# Patient Record
Sex: MALE | Race: WHITE | NOT HISPANIC OR LATINO | RURAL
[De-identification: names, ages, dates, MRNs, and addresses within clinical notes are randomized per-mention and may not be internally consistent; named-entity substitution may affect disease eponyms.]

---

## 2021-04-13 DIAGNOSIS — N46.9 INFERTILITY MALE: Primary | ICD-10-CM

## 2022-09-01 ENCOUNTER — OFFICE VISIT (OUTPATIENT)
Dept: FAMILY MEDICINE | Facility: CLINIC | Age: 47
End: 2022-09-01
Payer: COMMERCIAL

## 2022-09-01 VITALS
HEART RATE: 98 BPM | BODY MASS INDEX: 34.66 KG/M2 | SYSTOLIC BLOOD PRESSURE: 160 MMHG | WEIGHT: 234 LBS | HEIGHT: 69 IN | DIASTOLIC BLOOD PRESSURE: 98 MMHG

## 2022-09-01 DIAGNOSIS — R06.00 NOCTURNAL DYSPNEA: Primary | ICD-10-CM

## 2022-09-01 DIAGNOSIS — R19.7 DIARRHEA, UNSPECIFIED TYPE: ICD-10-CM

## 2022-09-01 DIAGNOSIS — I10 PRIMARY HYPERTENSION: ICD-10-CM

## 2022-09-01 DIAGNOSIS — R06.02 SHORTNESS OF BREATH: ICD-10-CM

## 2022-09-01 PROCEDURE — 99204 PR OFFICE/OUTPT VISIT, NEW, LEVL IV, 45-59 MIN: ICD-10-PCS | Mod: ,,, | Performed by: FAMILY MEDICINE

## 2022-09-01 PROCEDURE — 3077F SYST BP >= 140 MM HG: CPT | Mod: ,,, | Performed by: FAMILY MEDICINE

## 2022-09-01 PROCEDURE — 80061 LIPID PANEL: CPT | Mod: ,,, | Performed by: CLINICAL MEDICAL LABORATORY

## 2022-09-01 PROCEDURE — 1159F MED LIST DOCD IN RCRD: CPT | Mod: ,,, | Performed by: FAMILY MEDICINE

## 2022-09-01 PROCEDURE — 3008F PR BODY MASS INDEX (BMI) DOCUMENTED: ICD-10-PCS | Mod: ,,, | Performed by: FAMILY MEDICINE

## 2022-09-01 PROCEDURE — 83735 MAGNESIUM: ICD-10-PCS | Mod: ,,, | Performed by: CLINICAL MEDICAL LABORATORY

## 2022-09-01 PROCEDURE — 3080F PR MOST RECENT DIASTOLIC BLOOD PRESSURE >= 90 MM HG: ICD-10-PCS | Mod: ,,, | Performed by: FAMILY MEDICINE

## 2022-09-01 PROCEDURE — 80053 COMPREHENSIVE METABOLIC PANEL: ICD-10-PCS | Mod: ,,, | Performed by: CLINICAL MEDICAL LABORATORY

## 2022-09-01 PROCEDURE — 3008F BODY MASS INDEX DOCD: CPT | Mod: ,,, | Performed by: FAMILY MEDICINE

## 2022-09-01 PROCEDURE — 80061 LIPID PANEL: ICD-10-PCS | Mod: ,,, | Performed by: CLINICAL MEDICAL LABORATORY

## 2022-09-01 PROCEDURE — 85025 CBC WITH DIFFERENTIAL: ICD-10-PCS | Mod: ,,, | Performed by: CLINICAL MEDICAL LABORATORY

## 2022-09-01 PROCEDURE — 80053 COMPREHEN METABOLIC PANEL: CPT | Mod: ,,, | Performed by: CLINICAL MEDICAL LABORATORY

## 2022-09-01 PROCEDURE — 4010F PR ACE/ARB THEARPY RXD/TAKEN: ICD-10-PCS | Mod: ,,, | Performed by: FAMILY MEDICINE

## 2022-09-01 PROCEDURE — 3080F DIAST BP >= 90 MM HG: CPT | Mod: ,,, | Performed by: FAMILY MEDICINE

## 2022-09-01 PROCEDURE — 82784 IGA: ICD-10-PCS | Mod: ,,, | Performed by: CLINICAL MEDICAL LABORATORY

## 2022-09-01 PROCEDURE — 1159F PR MEDICATION LIST DOCUMENTED IN MEDICAL RECORD: ICD-10-PCS | Mod: ,,, | Performed by: FAMILY MEDICINE

## 2022-09-01 PROCEDURE — 83735 ASSAY OF MAGNESIUM: CPT | Mod: ,,, | Performed by: CLINICAL MEDICAL LABORATORY

## 2022-09-01 PROCEDURE — 85025 COMPLETE CBC W/AUTO DIFF WBC: CPT | Mod: ,,, | Performed by: CLINICAL MEDICAL LABORATORY

## 2022-09-01 PROCEDURE — 3077F PR MOST RECENT SYSTOLIC BLOOD PRESSURE >= 140 MM HG: ICD-10-PCS | Mod: ,,, | Performed by: FAMILY MEDICINE

## 2022-09-01 PROCEDURE — 99204 OFFICE O/P NEW MOD 45 MIN: CPT | Mod: ,,, | Performed by: FAMILY MEDICINE

## 2022-09-01 PROCEDURE — 82784 ASSAY IGA/IGD/IGG/IGM EACH: CPT | Mod: ,,, | Performed by: CLINICAL MEDICAL LABORATORY

## 2022-09-01 PROCEDURE — 4010F ACE/ARB THERAPY RXD/TAKEN: CPT | Mod: ,,, | Performed by: FAMILY MEDICINE

## 2022-09-01 RX ORDER — LOSARTAN POTASSIUM 25 MG/1
25 TABLET ORAL DAILY
Qty: 90 TABLET | Refills: 3 | Status: SHIPPED | OUTPATIENT
Start: 2022-09-01 | End: 2022-09-08

## 2022-09-01 NOTE — PROGRESS NOTES
Nito Junior IV, DO  [facility]  [address] [place] [address]  Phone: [phone]      Subjective     Name: Wallace Pedro   Sex: male  YOB: 1975 (46 y.o.)  MRN: [id number]  Visit Date: 09/01/2022   Chief Complaint: Physical Exam (Pt has multiple issues he needs addressed. )        HISTORY OF PRESENT ILLNESS:    Patient presents to clinic to establish care.  He has multiple problems he would like addressed and we are limiting them to 3 for the day.  First problem he did like to discuss is his trouble sleeping.  He does have a BMI of greater than 34 there are no labs in the system no imaging in the system no history draw from.  Patient is also hypertensive at 160/90 a.  Medical decision making is likely obstructive sleep apnea wife verifies the patient will sometimes stops breathing and wake up in the night.  I will refer him for a sleep study.  Secondly we will adjust his hypertension unknown ASCVD score lacking cholesterol a lipid panel will order those today for baseline and stratify stratify cardiovascular risk.  I will be starting him on losartan 25 mg p.o. q.d. to start with he will come back next week for re-evaluation of blood pressure.  Lastly patient has had some fecal incontinence this occurs mostly at night he does drink drink 96 oz of beer or more port per evening.  He admits this sometimes leaves him dehydrated.  He has lots of gas involved with it.  And although his bowel movements are regular through the day he does have fecal incontinence at night.  Patient and wife were counseled about responsible alcohol intake.  It is likely that patient would benefit from reducing intake.  Patient agrees that this was a good visit for today he will be back next week for evaluation and we will address any further concerns at that time.      PAST MEDICAL HISTORY:  Significant Diagnoses - Patient  has no past medical history on file.  Medications - Patient  "has a current medication list which includes the following long-term medication(s): losartan.   Allergies - Patient has No Known Allergies.  Surgeries - Patient  has no past surgical history on file.  Family History - Patient family history includes Diabetes in his father; Heart disease in his mother; Hypertension in his father.      SOCIAL HISTORY:  Tobacco - Patient  reports that he has quit smoking. His smoking use included cigarettes. He has never used smokeless tobacco.   Alcohol - Patient  reports current alcohol use of about 8.0 standard drinks per week.   Recreational Drugs - Patient  reports no history of drug use.       Review of Systems   Constitutional:  Negative for fatigue and fever.   HENT:  Negative for nasal congestion and sore throat.    Respiratory:  Positive for shortness of breath. Negative for cough.    Cardiovascular:  Positive for chest pain.   Gastrointestinal:  Negative for abdominal pain, nausea and vomiting.   Genitourinary:  Negative for dysuria.   Musculoskeletal:  Negative for myalgias.   Integumentary:  Negative for rash.   Neurological:  Negative for dizziness, weakness and headaches.        History reviewed. No pertinent past medical history.     Review of patient's allergies indicates:  No Known Allergies     History reviewed. No pertinent surgical history.     Family History   Problem Relation Age of Onset    Heart disease Mother     Diabetes Father     Hypertension Father        Current Outpatient Medications   Medication Instructions    losartan (COZAAR) 25 mg, Oral, Daily        Objective     BP (!) 160/98   Pulse 98   Ht 5' 9" (1.753 m)   Wt 106.1 kg (234 lb)   BMI 34.56 kg/m²     Physical Exam  Constitutional:       General: He is not in acute distress.     Appearance: Normal appearance. He is not ill-appearing.   HENT:      Head: Normocephalic and atraumatic.      Right Ear: External ear normal.      Left Ear: External ear normal.      Nose: Nose normal.   Eyes:      " Extraocular Movements: Extraocular movements intact.   Cardiovascular:      Rate and Rhythm: Normal rate.   Pulmonary:      Effort: Pulmonary effort is normal.   Abdominal:      Palpations: Abdomen is soft.   Musculoskeletal:      Cervical back: Normal range of motion. No tenderness.   Neurological:      Mental Status: He is alert.   Psychiatric:         Mood and Affect: Mood normal.         Behavior: Behavior normal.        All recently obtained labs have been reviewed and discussed in detail with the patient.   Assessment     1. Nocturnal dyspnea    2. BMI 34.0-34.9,adult    3. Shortness of breath    4. Primary hypertension    5. Diarrhea, unspecified type         Plan        Problem List Items Addressed This Visit    None  Visit Diagnoses       Nocturnal dyspnea    -  Primary    Relevant Orders    Ambulatory referral/consult to Sleep Disorders    BMI 34.0-34.9,adult        Shortness of breath        Primary hypertension        Relevant Medications    losartan (COZAAR) 25 MG tablet    Other Relevant Orders    Lipid Panel    CBC Auto Differential    Comprehensive Metabolic Panel    IgA    Magnesium    Diarrhea, unspecified type        Relevant Orders    IgA            No follow-ups on file.    Nito Junior,   [facility]

## 2022-09-02 LAB
ALBUMIN SERPL BCP-MCNC: 3.6 G/DL (ref 3.5–5)
ALBUMIN/GLOB SERPL: 1.1 {RATIO}
ALP SERPL-CCNC: 258 U/L (ref 45–115)
ALT SERPL W P-5'-P-CCNC: 69 U/L (ref 16–61)
ANION GAP SERPL CALCULATED.3IONS-SCNC: 12 MMOL/L (ref 7–16)
AST SERPL W P-5'-P-CCNC: 36 U/L (ref 15–37)
BASOPHILS # BLD AUTO: 0.06 K/UL (ref 0–0.2)
BASOPHILS NFR BLD AUTO: 0.9 % (ref 0–1)
BILIRUB SERPL-MCNC: 0.4 MG/DL (ref ?–1.2)
BUN SERPL-MCNC: 11 MG/DL (ref 7–18)
BUN/CREAT SERPL: 14 (ref 6–20)
CALCIUM SERPL-MCNC: 8.5 MG/DL (ref 8.5–10.1)
CHLORIDE SERPL-SCNC: 100 MMOL/L (ref 98–107)
CHOLEST SERPL-MCNC: 305 MG/DL (ref 0–200)
CHOLEST/HDLC SERPL: 10.9 {RATIO}
CO2 SERPL-SCNC: 26 MMOL/L (ref 21–32)
CREAT SERPL-MCNC: 0.78 MG/DL (ref 0.7–1.3)
DIFFERENTIAL METHOD BLD: ABNORMAL
EGFR (NO RACE VARIABLE) (RUSH/TITUS): 113 ML/MIN/1.73M²
EOSINOPHIL # BLD AUTO: 0.19 K/UL (ref 0–0.5)
EOSINOPHIL NFR BLD AUTO: 3 % (ref 1–4)
ERYTHROCYTE [DISTWIDTH] IN BLOOD BY AUTOMATED COUNT: 12.2 % (ref 11.5–14.5)
GLOBULIN SER-MCNC: 3.4 G/DL (ref 2–4)
GLUCOSE SERPL-MCNC: 444 MG/DL (ref 74–106)
HCT VFR BLD AUTO: 46.5 % (ref 40–54)
HDLC SERPL-MCNC: 28 MG/DL (ref 40–60)
HGB BLD-MCNC: 16.6 G/DL (ref 13.5–18)
IGA SERPL-MCNC: 401 MG/DL (ref 61–356)
IMM GRANULOCYTES # BLD AUTO: 0.04 K/UL (ref 0–0.04)
IMM GRANULOCYTES NFR BLD: 0.6 % (ref 0–0.4)
LDLC SERPL CALC-MCNC: ABNORMAL MG/DL
LDLC/HDLC SERPL: ABNORMAL {RATIO}
LYMPHOCYTES # BLD AUTO: 2.3 K/UL (ref 1–4.8)
LYMPHOCYTES NFR BLD AUTO: 36.3 % (ref 27–41)
MAGNESIUM SERPL-MCNC: 2.1 MG/DL (ref 1.7–2.3)
MCH RBC QN AUTO: 33.5 PG (ref 27–31)
MCHC RBC AUTO-ENTMCNC: 35.7 G/DL (ref 32–36)
MCV RBC AUTO: 93.9 FL (ref 80–96)
MONOCYTES # BLD AUTO: 0.64 K/UL (ref 0–0.8)
MONOCYTES NFR BLD AUTO: 10.1 % (ref 2–6)
MPC BLD CALC-MCNC: 12 FL (ref 9.4–12.4)
NEUTROPHILS # BLD AUTO: 3.1 K/UL (ref 1.8–7.7)
NEUTROPHILS NFR BLD AUTO: 49.1 % (ref 53–65)
NONHDLC SERPL-MCNC: 277 MG/DL
NRBC # BLD AUTO: 0 X10E3/UL
NRBC, AUTO (.00): 0 %
PLATELET # BLD AUTO: 174 K/UL (ref 150–400)
POTASSIUM SERPL-SCNC: 4.1 MMOL/L (ref 3.5–5.1)
PROT SERPL-MCNC: 7 G/DL (ref 6.4–8.2)
RBC # BLD AUTO: 4.95 M/UL (ref 4.6–6.2)
SODIUM SERPL-SCNC: 134 MMOL/L (ref 136–145)
TRIGL SERPL-MCNC: 2354 MG/DL (ref 35–150)
VLDLC SERPL-MCNC: ABNORMAL MG/DL
WBC # BLD AUTO: 6.33 K/UL (ref 4.5–11)

## 2022-09-08 ENCOUNTER — OFFICE VISIT (OUTPATIENT)
Dept: FAMILY MEDICINE | Facility: CLINIC | Age: 47
End: 2022-09-08
Payer: COMMERCIAL

## 2022-09-08 VITALS
HEIGHT: 69 IN | SYSTOLIC BLOOD PRESSURE: 157 MMHG | HEART RATE: 104 BPM | WEIGHT: 234 LBS | BODY MASS INDEX: 34.66 KG/M2 | DIASTOLIC BLOOD PRESSURE: 103 MMHG

## 2022-09-08 DIAGNOSIS — Z00.00 ROUTINE GENERAL MEDICAL EXAMINATION AT A HEALTH CARE FACILITY: Primary | ICD-10-CM

## 2022-09-08 DIAGNOSIS — I10 PRIMARY HYPERTENSION: ICD-10-CM

## 2022-09-08 DIAGNOSIS — R73.01 IMPAIRED FASTING BLOOD SUGAR: ICD-10-CM

## 2022-09-08 DIAGNOSIS — Z91.89 AT RISK FOR ACUTE ISCHEMIC CARDIAC EVENT: ICD-10-CM

## 2022-09-08 PROCEDURE — 4010F ACE/ARB THERAPY RXD/TAKEN: CPT | Mod: ,,, | Performed by: FAMILY MEDICINE

## 2022-09-08 PROCEDURE — 4010F PR ACE/ARB THEARPY RXD/TAKEN: ICD-10-PCS | Mod: ,,, | Performed by: FAMILY MEDICINE

## 2022-09-08 PROCEDURE — 1160F PR REVIEW ALL MEDS BY PRESCRIBER/CLIN PHARMACIST DOCUMENTED: ICD-10-PCS | Mod: ,,, | Performed by: FAMILY MEDICINE

## 2022-09-08 PROCEDURE — 3080F DIAST BP >= 90 MM HG: CPT | Mod: ,,, | Performed by: FAMILY MEDICINE

## 2022-09-08 PROCEDURE — 3077F SYST BP >= 140 MM HG: CPT | Mod: ,,, | Performed by: FAMILY MEDICINE

## 2022-09-08 PROCEDURE — 1159F MED LIST DOCD IN RCRD: CPT | Mod: ,,, | Performed by: FAMILY MEDICINE

## 2022-09-08 PROCEDURE — 83036 HEMOGLOBIN A1C: ICD-10-PCS | Mod: ,,, | Performed by: CLINICAL MEDICAL LABORATORY

## 2022-09-08 PROCEDURE — 99396 PR PREVENTIVE VISIT,EST,40-64: ICD-10-PCS | Mod: ,,, | Performed by: FAMILY MEDICINE

## 2022-09-08 PROCEDURE — 3008F BODY MASS INDEX DOCD: CPT | Mod: ,,, | Performed by: FAMILY MEDICINE

## 2022-09-08 PROCEDURE — 1160F RVW MEDS BY RX/DR IN RCRD: CPT | Mod: ,,, | Performed by: FAMILY MEDICINE

## 2022-09-08 PROCEDURE — 3008F PR BODY MASS INDEX (BMI) DOCUMENTED: ICD-10-PCS | Mod: ,,, | Performed by: FAMILY MEDICINE

## 2022-09-08 PROCEDURE — 83036 HEMOGLOBIN GLYCOSYLATED A1C: CPT | Mod: ,,, | Performed by: CLINICAL MEDICAL LABORATORY

## 2022-09-08 PROCEDURE — 3077F PR MOST RECENT SYSTOLIC BLOOD PRESSURE >= 140 MM HG: ICD-10-PCS | Mod: ,,, | Performed by: FAMILY MEDICINE

## 2022-09-08 PROCEDURE — 99396 PREV VISIT EST AGE 40-64: CPT | Mod: ,,, | Performed by: FAMILY MEDICINE

## 2022-09-08 PROCEDURE — 1159F PR MEDICATION LIST DOCUMENTED IN MEDICAL RECORD: ICD-10-PCS | Mod: ,,, | Performed by: FAMILY MEDICINE

## 2022-09-08 PROCEDURE — 3080F PR MOST RECENT DIASTOLIC BLOOD PRESSURE >= 90 MM HG: ICD-10-PCS | Mod: ,,, | Performed by: FAMILY MEDICINE

## 2022-09-08 RX ORDER — ATORVASTATIN CALCIUM 40 MG/1
40 TABLET, FILM COATED ORAL DAILY
Qty: 90 TABLET | Refills: 3 | Status: SHIPPED | OUTPATIENT
Start: 2022-09-08 | End: 2023-09-08

## 2022-09-08 RX ORDER — LOSARTAN POTASSIUM 25 MG/1
50 TABLET ORAL DAILY
Qty: 180 TABLET | Refills: 3
Start: 2022-09-08 | End: 2022-09-29

## 2022-09-08 NOTE — PROGRESS NOTES
Nito Junior IV, DO  [facility]  [address] [place] [address]  Phone: [phone]      Subjective     Name: Wallace Pedro   Sex: male  YOB: 1975 (46 y.o.)  MRN: [id number]  Visit Date: 09/08/2022   Chief Complaint: Healthy You        HISTORY OF PRESENT ILLNESS:    Patient presents to clinic today for Healthy U visit.  Lifestyle changes were discussed including medication adherence and importance of colon screening.  No family history of colon cancer patient's 1st colonoscopy is not due yet..  Exercise plan was put into place and education was provided patient was advised to check blood pressure at home and keep a log to bring on return to clinic.  Patient is made aware of when his next labs will be due including A1c be ordered today.      Patient has had blood glucose of 444 on previous CMP A1c will be drawn today for Healthy U visit.  Anticipate starting metformin on results when they return.  Patient was started on losartan 25 mg p.o. q.d. on his last visit blood pressure is slightly improved but still elevated with systolic 157.  Patient will now be taking losartan 50 mg p.o. q.d. return to clinic in 2 weeks for blood pressure check with logs.  Patient had previously increased lipid panel.  We discussed his ASCVD risk is calculated below.  He is amenable to starting a statin IM 1 start atorvastatin 40 mg p.o. q.d.  Had anticipated elevated A1c medical decision making will be between metformin and insulin.  On return to clinic will address vaccination status and anticipate closing further care gaps on next laboratory draw likely in 3 months.      The 10-year ASCVD risk score (Naya OLGUIN, et al., 2019) is: 12.9%    Values used to calculate the score:      Age: 46 years      Sex: Male      Is Non- : No      Diabetic: No      Tobacco smoker: No      Systolic Blood Pressure: 157 mmHg      Is BP treated: Yes      HDL Cholesterol: 28  "mg/dL      Total Cholesterol: 305 mg/dL        PAST MEDICAL HISTORY:  Significant Diagnoses - Patient  has a past medical history of Hypertension.  Medications - Patient has a current medication list which includes the following long-term medication(s): atorvastatin and losartan.   Allergies - Patient has No Known Allergies.  Surgeries - Patient  has no past surgical history on file.  Family History - Patient family history includes Diabetes in his father; Heart disease in his mother; Hypertension in his father.      SOCIAL HISTORY:  Tobacco - Patient  reports that he quit smoking about 3 years ago. His smoking use included cigarettes. He has never used smokeless tobacco.   Alcohol - Patient  reports current alcohol use of about 8.0 standard drinks per week.   Recreational Drugs - Patient  reports no history of drug use.       Review of Systems   Constitutional:  Negative for fatigue and fever.   HENT:  Negative for nasal congestion and sore throat.    Respiratory:  Negative for cough and shortness of breath.    Cardiovascular:  Negative for chest pain.   Gastrointestinal:  Positive for fecal incontinence. Negative for abdominal pain, nausea and vomiting.   Genitourinary:  Negative for dysuria.   Musculoskeletal:  Negative for myalgias.   Integumentary:  Negative for rash.   Neurological:  Negative for dizziness, weakness and headaches.        Past Medical History:   Diagnosis Date    Hypertension         Review of patient's allergies indicates:  No Known Allergies     No past surgical history on file.     Family History   Problem Relation Age of Onset    Heart disease Mother     Diabetes Father     Hypertension Father        Current Outpatient Medications   Medication Instructions    atorvastatin (LIPITOR) 40 mg, Oral, Daily    losartan (COZAAR) 50 mg, Oral, Daily        Objective     BP (!) 157/103   Pulse 104   Ht 5' 9" (1.753 m)   Wt 106.1 kg (234 lb)   BMI 34.56 kg/m²     Physical " Exam  Constitutional:       General: He is not in acute distress.     Appearance: Normal appearance. He is not ill-appearing.   HENT:      Head: Normocephalic and atraumatic.      Right Ear: External ear normal.      Left Ear: External ear normal.      Nose: Nose normal.   Eyes:      Extraocular Movements: Extraocular movements intact.   Cardiovascular:      Rate and Rhythm: Normal rate.   Pulmonary:      Effort: Pulmonary effort is normal.   Abdominal:      Palpations: Abdomen is soft.   Musculoskeletal:      Cervical back: Normal range of motion. No tenderness.   Neurological:      Mental Status: He is alert.   Psychiatric:         Mood and Affect: Mood normal.         Behavior: Behavior normal.        All recently obtained labs have been reviewed and discussed in detail with the patient.   Assessment     1. Routine general medical examination at a health care facility    2. Primary hypertension    3. At risk for acute ischemic cardiac event         Plan        Problem List Items Addressed This Visit    None  Visit Diagnoses       Routine general medical examination at a health care facility    -  Primary    Relevant Orders    Hemoglobin A1C    Primary hypertension        Relevant Medications    losartan (COZAAR) 25 MG tablet    At risk for acute ischemic cardiac event        Relevant Medications    atorvastatin (LIPITOR) 40 MG tablet            Follow up in about 2 weeks (around 9/22/2022).    Nito Junior,   [facility]

## 2022-09-09 LAB
EST. AVERAGE GLUCOSE BLD GHB EST-MCNC: 310 MG/DL
HBA1C MFR BLD HPLC: 11.9 % (ref 4.5–6.6)

## 2022-09-12 DIAGNOSIS — E11.9 TYPE 2 DIABETES MELLITUS WITHOUT COMPLICATION, UNSPECIFIED WHETHER LONG TERM INSULIN USE: Primary | ICD-10-CM

## 2022-09-12 RX ORDER — METFORMIN HYDROCHLORIDE 500 MG/1
500 TABLET ORAL 2 TIMES DAILY WITH MEALS
Qty: 180 TABLET | Refills: 3 | Status: SHIPPED | OUTPATIENT
Start: 2022-09-12 | End: 2022-09-29

## 2022-09-29 ENCOUNTER — OFFICE VISIT (OUTPATIENT)
Dept: FAMILY MEDICINE | Facility: CLINIC | Age: 47
End: 2022-09-29
Payer: COMMERCIAL

## 2022-09-29 VITALS
HEART RATE: 92 BPM | DIASTOLIC BLOOD PRESSURE: 102 MMHG | SYSTOLIC BLOOD PRESSURE: 170 MMHG | WEIGHT: 234 LBS | HEIGHT: 69 IN | BODY MASS INDEX: 34.66 KG/M2

## 2022-09-29 DIAGNOSIS — Z91.89 AT RISK FOR ACUTE ISCHEMIC CARDIAC EVENT: ICD-10-CM

## 2022-09-29 DIAGNOSIS — I10 PRIMARY HYPERTENSION: Primary | ICD-10-CM

## 2022-09-29 DIAGNOSIS — E11.9 TYPE 2 DIABETES MELLITUS WITHOUT COMPLICATION, UNSPECIFIED WHETHER LONG TERM INSULIN USE: ICD-10-CM

## 2022-09-29 DIAGNOSIS — Z13.9 SCREENING DUE: ICD-10-CM

## 2022-09-29 DIAGNOSIS — K02.9 DENTAL CARIES: ICD-10-CM

## 2022-09-29 PROCEDURE — 3008F PR BODY MASS INDEX (BMI) DOCUMENTED: ICD-10-PCS | Mod: ,,, | Performed by: FAMILY MEDICINE

## 2022-09-29 PROCEDURE — 99213 PR OFFICE/OUTPT VISIT, EST, LEVL III, 20-29 MIN: ICD-10-PCS | Mod: ,,, | Performed by: FAMILY MEDICINE

## 2022-09-29 PROCEDURE — 3008F BODY MASS INDEX DOCD: CPT | Mod: ,,, | Performed by: FAMILY MEDICINE

## 2022-09-29 PROCEDURE — 1160F PR REVIEW ALL MEDS BY PRESCRIBER/CLIN PHARMACIST DOCUMENTED: ICD-10-PCS | Mod: ,,, | Performed by: FAMILY MEDICINE

## 2022-09-29 PROCEDURE — 3080F DIAST BP >= 90 MM HG: CPT | Mod: ,,, | Performed by: FAMILY MEDICINE

## 2022-09-29 PROCEDURE — 3077F PR MOST RECENT SYSTOLIC BLOOD PRESSURE >= 140 MM HG: ICD-10-PCS | Mod: ,,, | Performed by: FAMILY MEDICINE

## 2022-09-29 PROCEDURE — 3080F PR MOST RECENT DIASTOLIC BLOOD PRESSURE >= 90 MM HG: ICD-10-PCS | Mod: ,,, | Performed by: FAMILY MEDICINE

## 2022-09-29 PROCEDURE — 3046F HEMOGLOBIN A1C LEVEL >9.0%: CPT | Mod: ,,, | Performed by: FAMILY MEDICINE

## 2022-09-29 PROCEDURE — 99213 OFFICE O/P EST LOW 20 MIN: CPT | Mod: ,,, | Performed by: FAMILY MEDICINE

## 2022-09-29 PROCEDURE — 3046F PR MOST RECENT HEMOGLOBIN A1C LEVEL > 9.0%: ICD-10-PCS | Mod: ,,, | Performed by: FAMILY MEDICINE

## 2022-09-29 PROCEDURE — 3077F SYST BP >= 140 MM HG: CPT | Mod: ,,, | Performed by: FAMILY MEDICINE

## 2022-09-29 PROCEDURE — 1160F RVW MEDS BY RX/DR IN RCRD: CPT | Mod: ,,, | Performed by: FAMILY MEDICINE

## 2022-09-29 PROCEDURE — 1159F PR MEDICATION LIST DOCUMENTED IN MEDICAL RECORD: ICD-10-PCS | Mod: ,,, | Performed by: FAMILY MEDICINE

## 2022-09-29 PROCEDURE — 1159F MED LIST DOCD IN RCRD: CPT | Mod: ,,, | Performed by: FAMILY MEDICINE

## 2022-09-29 RX ORDER — METFORMIN HYDROCHLORIDE 1000 MG/1
1000 TABLET, FILM COATED, EXTENDED RELEASE ORAL
Qty: 90 TABLET | Refills: 3 | Status: SHIPPED | OUTPATIENT
Start: 2022-09-29 | End: 2023-09-29

## 2022-09-29 RX ORDER — SEMAGLUTIDE 1.34 MG/ML
INJECTION, SOLUTION SUBCUTANEOUS
Qty: 4 PEN | Refills: 0 | Status: SHIPPED | OUTPATIENT
Start: 2022-09-29 | End: 2023-01-25

## 2022-09-29 RX ORDER — PENICILLIN V POTASSIUM 500 MG/1
500 TABLET, FILM COATED ORAL EVERY 8 HOURS
Qty: 21 TABLET | Refills: 0 | Status: SHIPPED | OUTPATIENT
Start: 2022-09-29 | End: 2022-10-06

## 2022-09-29 RX ORDER — LOSARTAN POTASSIUM AND HYDROCHLOROTHIAZIDE 12.5; 5 MG/1; MG/1
1 TABLET ORAL DAILY
Qty: 90 TABLET | Refills: 3 | Status: SHIPPED | OUTPATIENT
Start: 2022-09-29 | End: 2023-09-29

## 2022-09-29 NOTE — PROGRESS NOTES
"              Nito Junior IV, DO  [facility]  [address] [place] [address]  Phone: [phone]      Subjective     Name: Wallace Pedro   Sex: male  YOB: 1975 (47 y.o.)  MRN: [id number]  Visit Date: 09/29/2022   Chief Complaint: Color Me Healthy        HISTORY OF PRESENT ILLNESS:    Patient presents to clinic today for his color me healthy visit from [email or web address]  No screening labs required at this time.  Patient has an A1c in excess of 11 and we have discussed multiple options.  Patient is currently on metformin 500 mg p.o. b.i.d. that will be changing to metformin ER 1000 mg p.o. q.d. were also going to add Ozempic with a starting dose of 0.25 mg per injection.  Patient is still hypertensive with systolic greater than 160 in office I will be stopping his losartan 25 mg p.o. q.d. and will initiate Hyzaar "losartan hydrochlorothiazide" 50/12.5 p.o. q.d. this is patient's 1st: Healthy visit of this calendar year and next will be scheduled for 3 months.      This document was dictated using mmodal fluency direct.  It is subject to dictation errors.    PAST MEDICAL HISTORY:  Significant Diagnoses - Patient  has a past medical history of Hypertension.  Medications - Patient has a current medication list which includes the following long-term medication(s): atorvastatin, losartan-hydrochlorothiazide 50-12.5 mg, and metformin.   Allergies - Patient has No Known Allergies.  Surgeries - Patient  has no past surgical history on file.  Family History - Patient family history includes Diabetes in his father; Heart disease in his mother; Hypertension in his father.      SOCIAL HISTORY:  Tobacco - Patient  reports that he quit smoking about 3 years ago. His smoking use included cigarettes. He has never used smokeless tobacco.   Alcohol - Patient  reports current alcohol use of about 8.0 standard drinks per week.   Recreational Drugs - Patient  reports no history of drug " "use.       Review of Systems       Past Medical History:   Diagnosis Date    Hypertension         Review of patient's allergies indicates:  No Known Allergies     History reviewed. No pertinent surgical history.     Family History   Problem Relation Age of Onset    Heart disease Mother     Diabetes Father     Hypertension Father        Current Outpatient Medications   Medication Instructions    atorvastatin (LIPITOR) 40 mg, Oral, Daily    losartan-hydrochlorothiazide 50-12.5 mg (HYZAAR) 50-12.5 mg per tablet 1 tablet, Oral, Daily    metFORMIN (GLUMETZA) 1,000 mg, Oral, With breakfast    penicillin v potassium (VEETID) 500 mg, Oral, Every 8 hours    semaglutide (OZEMPIC) 0.25 mg or 0.5 mg(2 mg/1.5 mL) pen injector Inject 0.25 mg into the skin every 7 days for 28 days, THEN 0.5 mg every 7 days.        Objective     BP (!) 170/102   Pulse 92   Ht 5' 9" (1.753 m)   Wt 106.1 kg (234 lb)   BMI 34.56 kg/m²     Physical Exam     All recently obtained labs have been reviewed and discussed in detail with the patient.   Assessment     1. Primary hypertension    2. Type 2 diabetes mellitus without complication, unspecified whether long term insulin use    3. At risk for acute ischemic cardiac event    4. Screening due    5. Dental caries         Plan        Problem List Items Addressed This Visit    None  Visit Diagnoses       Primary hypertension    -  Primary    Relevant Medications    losartan-hydrochlorothiazide 50-12.5 mg (HYZAAR) 50-12.5 mg per tablet    Type 2 diabetes mellitus without complication, unspecified whether long term insulin use        Relevant Medications    semaglutide (OZEMPIC) 0.25 mg or 0.5 mg(2 mg/1.5 mL) pen injector    losartan-hydrochlorothiazide 50-12.5 mg (HYZAAR) 50-12.5 mg per tablet    metFORMIN (GLUMETZA) 1000 MG (MOD) 24hr tablet    At risk for acute ischemic cardiac event        Screening due        Relevant Orders    Ambulatory referral/consult to General Surgery    Dental " caries        Relevant Medications    penicillin v potassium (VEETID) 500 MG tablet            No follow-ups on file.    Patient advised that is symptoms worsen, they should call or report directly to local emergency department.    Nito Junior DO  [facility]

## 2022-10-13 ENCOUNTER — TELEPHONE (OUTPATIENT)
Dept: FAMILY MEDICINE | Facility: CLINIC | Age: 47
End: 2022-10-13
Payer: COMMERCIAL

## 2022-10-13 ENCOUNTER — TELEPHONE (OUTPATIENT)
Dept: SURGERY | Facility: CLINIC | Age: 47
End: 2022-10-13
Payer: COMMERCIAL

## 2022-10-13 NOTE — TELEPHONE ENCOUNTER
Called pt to get a remote bp bc of elevated pressure in clinic. Pt stated he had not purchased a bp machine yet. Remote pressure was not available

## 2022-10-28 ENCOUNTER — TELEPHONE (OUTPATIENT)
Dept: SURGERY | Facility: CLINIC | Age: 47
End: 2022-10-28
Payer: COMMERCIAL

## 2022-10-28 DIAGNOSIS — Z12.11 ENCOUNTER FOR SCREENING COLONOSCOPY: Primary | ICD-10-CM

## 2022-10-28 RX ORDER — SODIUM CHLORIDE 9 MG/ML
INJECTION, SOLUTION INTRAVENOUS CONTINUOUS
Status: CANCELLED | OUTPATIENT
Start: 2022-10-28

## 2022-10-28 NOTE — TELEPHONE ENCOUNTER
Spoke with patient. Scheduled colonoscopy for 12/28/22 at [facility]. Discussed prep. Verbalized understanding. Will mail letter.

## 2022-12-15 ENCOUNTER — TELEPHONE (OUTPATIENT)
Dept: SURGERY | Facility: CLINIC | Age: 47
End: 2022-12-15
Payer: COMMERCIAL

## 2022-12-20 ENCOUNTER — TELEPHONE (OUTPATIENT)
Dept: SURGERY | Facility: CLINIC | Age: 47
End: 2022-12-20
Payer: COMMERCIAL

## 2022-12-28 ENCOUNTER — OFFICE VISIT (OUTPATIENT)
Dept: FAMILY MEDICINE | Facility: CLINIC | Age: 47
End: 2022-12-28
Payer: COMMERCIAL

## 2022-12-28 VITALS
HEART RATE: 92 BPM | DIASTOLIC BLOOD PRESSURE: 76 MMHG | SYSTOLIC BLOOD PRESSURE: 127 MMHG | HEIGHT: 69 IN | WEIGHT: 218 LBS | BODY MASS INDEX: 32.29 KG/M2

## 2022-12-28 DIAGNOSIS — Z13.1 SCREENING FOR DIABETES MELLITUS: ICD-10-CM

## 2022-12-28 DIAGNOSIS — I10 ESSENTIAL HYPERTENSION: Primary | ICD-10-CM

## 2022-12-28 DIAGNOSIS — Z12.11 SCREENING FOR COLON CANCER: ICD-10-CM

## 2022-12-28 DIAGNOSIS — E11.9 TYPE 2 DIABETES MELLITUS WITHOUT COMPLICATION, WITHOUT LONG-TERM CURRENT USE OF INSULIN: ICD-10-CM

## 2022-12-28 DIAGNOSIS — E11.9 TYPE 2 DIABETES MELLITUS WITHOUT COMPLICATION, UNSPECIFIED WHETHER LONG TERM INSULIN USE: ICD-10-CM

## 2022-12-28 DIAGNOSIS — Z13.6 SCREENING FOR CARDIOVASCULAR CONDITION: ICD-10-CM

## 2022-12-28 PROCEDURE — 3078F PR MOST RECENT DIASTOLIC BLOOD PRESSURE < 80 MM HG: ICD-10-PCS | Mod: ,,, | Performed by: FAMILY MEDICINE

## 2022-12-28 PROCEDURE — 4010F ACE/ARB THERAPY RXD/TAKEN: CPT | Mod: ,,, | Performed by: FAMILY MEDICINE

## 2022-12-28 PROCEDURE — 1159F PR MEDICATION LIST DOCUMENTED IN MEDICAL RECORD: ICD-10-PCS | Mod: ,,, | Performed by: FAMILY MEDICINE

## 2022-12-28 PROCEDURE — 3008F BODY MASS INDEX DOCD: CPT | Mod: ,,, | Performed by: FAMILY MEDICINE

## 2022-12-28 PROCEDURE — 4010F PR ACE/ARB THEARPY RXD/TAKEN: ICD-10-PCS | Mod: ,,, | Performed by: FAMILY MEDICINE

## 2022-12-28 PROCEDURE — 99214 OFFICE O/P EST MOD 30 MIN: CPT | Mod: ,,, | Performed by: FAMILY MEDICINE

## 2022-12-28 PROCEDURE — 3074F PR MOST RECENT SYSTOLIC BLOOD PRESSURE < 130 MM HG: ICD-10-PCS | Mod: ,,, | Performed by: FAMILY MEDICINE

## 2022-12-28 PROCEDURE — 3074F SYST BP LT 130 MM HG: CPT | Mod: ,,, | Performed by: FAMILY MEDICINE

## 2022-12-28 PROCEDURE — 1159F MED LIST DOCD IN RCRD: CPT | Mod: ,,, | Performed by: FAMILY MEDICINE

## 2022-12-28 PROCEDURE — 3046F HEMOGLOBIN A1C LEVEL >9.0%: CPT | Mod: ,,, | Performed by: FAMILY MEDICINE

## 2022-12-28 PROCEDURE — 3046F PR MOST RECENT HEMOGLOBIN A1C LEVEL > 9.0%: ICD-10-PCS | Mod: ,,, | Performed by: FAMILY MEDICINE

## 2022-12-28 PROCEDURE — 99214 PR OFFICE/OUTPT VISIT, EST, LEVL IV, 30-39 MIN: ICD-10-PCS | Mod: ,,, | Performed by: FAMILY MEDICINE

## 2022-12-28 PROCEDURE — 3008F PR BODY MASS INDEX (BMI) DOCUMENTED: ICD-10-PCS | Mod: ,,, | Performed by: FAMILY MEDICINE

## 2022-12-28 PROCEDURE — 3078F DIAST BP <80 MM HG: CPT | Mod: ,,, | Performed by: FAMILY MEDICINE

## 2022-12-28 NOTE — PROGRESS NOTES
Subjective     Patient ID: Wallace Pedro is a 47 y.o. male.    Chief Complaint: Color Me Healthy (#2 )    Patient presents clinic today with a chief complaint of could be healthy.  This is an additional visit for the patient's hypertension.  Hypertension is currently controlled at 127/76 in office today.  This is controlled with Hyzaar 50/12.5.  Patient is still on Ozempic.  He is currently taking it at 0.25 mg he never increased his dosage.  Will be checking the required labs today in addition I will be getting an A1c since it has been 3 months since last night.  We will titrate this medication based off of his A1c.  I expect a reduction in the overall ASCVD score today considering that patient has had many lifestyle changes over the last 3 months.  Patient has completely stopped drinking he has cut back on his vaping.  counseling was provided.  Wife is still concerned about sugar intake and A1c will help stratify.  Patient was unable to schedule colonoscopy.  After further discussion patient has no family history of colorectal cancer and would opt for Cologuard today.  I have provided brochure and I will order through the system.  All questions answered concerns addressed.  Patient to return to clinic within 3 months for additional A1c testing.        Review of Systems   Constitutional:  Negative for fatigue and fever.   HENT:  Negative for nasal congestion and sore throat.    Respiratory:  Negative for cough and shortness of breath.    Cardiovascular:  Negative for chest pain.   Gastrointestinal:  Negative for abdominal pain, nausea and vomiting.   Genitourinary:  Negative for dysuria.   Musculoskeletal:  Negative for myalgias.   Integumentary:  Negative for rash.   Neurological:  Negative for dizziness, weakness and headaches.     Tobacco Use: Medium Risk    Smoking Tobacco Use: Former    Smokeless Tobacco Use: Never    Passive Exposure: Not on file     Review of patient's allergies indicates:  No Known  "Allergies  Current Outpatient Medications   Medication Instructions    atorvastatin (LIPITOR) 40 mg, Oral, Daily    losartan-hydrochlorothiazide 50-12.5 mg (HYZAAR) 50-12.5 mg per tablet 1 tablet, Oral, Daily    metFORMIN (GLUMETZA) 1,000 mg, Oral, With breakfast    semaglutide (OZEMPIC) 0.25 mg or 0.5 mg(2 mg/1.5 mL) pen injector Inject 0.25 mg into the skin every 7 days for 28 days, THEN 0.5 mg every 7 days.     There are no discontinued medications.    Past Medical History:   Diagnosis Date    Diabetes mellitus, type 2     Hyperlipidemia     Hypertension      Health Maintenance Topics with due status: Not Due       Topic Last Completion Date    Lipid Panel 09/01/2022    Low Dose Statin 12/28/2022       There is no immunization history on file for this patient.    Objective     Body mass index is 32.19 kg/m².  Wt Readings from Last 3 Encounters:   12/28/22 98.9 kg (218 lb)   09/29/22 106.1 kg (234 lb)   09/08/22 106.1 kg (234 lb)     Ht Readings from Last 3 Encounters:   12/28/22 5' 9" (1.753 m)   09/29/22 5' 9" (1.753 m)   09/08/22 5' 9" (1.753 m)     BP Readings from Last 3 Encounters:   12/28/22 127/76   09/29/22 (!) 170/102   09/08/22 (!) 157/103     Temp Readings from Last 3 Encounters:   No data found for Temp     Pulse Readings from Last 3 Encounters:   12/28/22 92   09/29/22 92   09/08/22 104     Resp Readings from Last 3 Encounters:   No data found for Resp     PF Readings from Last 3 Encounters:   No data found for PF       Physical Exam  Constitutional:       General: He is not in acute distress.     Appearance: He is not ill-appearing or toxic-appearing.   HENT:      Head: Normocephalic and atraumatic.      Nose: Nose normal.   Eyes:      General: No scleral icterus.     Extraocular Movements: Extraocular movements intact.   Pulmonary:      Effort: Pulmonary effort is normal.   Musculoskeletal:      Cervical back: No rigidity or tenderness.   Skin:     Findings: No rash.     Assessment and Plan "     Problem List Items Addressed This Visit          Cardiac/Vascular    Essential hypertension - Primary    Relevant Orders    Glucose, Random    Lipid Panel    Hemoglobin A1C    Screening for cardiovascular condition    Relevant Orders    Lipid Panel     Other Visit Diagnoses       Screening for diabetes mellitus        Relevant Orders    Glucose, Random    Type 2 diabetes mellitus without complication, without long-term current use of insulin        Relevant Orders    Hemoglobin A1C    Screening for colon cancer        Relevant Orders    Cologuard Screening (Multitarget Stool DNA)    Type 2 diabetes mellitus without complication, unspecified whether long term insulin use                Plan:  Hemoglobin A1c for stratification of diabetes.  Continue current hypertension treatment.      I have reviewed the medications, allergies, and problem list.     Goal Actions:    What type of visit is the patient here for today?: Color Me Healthy  Which Color Me Healthy program is the patient enrolling in?: Combination Metabolic  Is this a Wellness Follow Up?: Yes  What is your overall wellness goal? (select at least one): Improve overall health  Choose 3: Lifestyle, Nutrition, Tobacco  Lifestyle Actions : Schedule colonoscopy, sigmoidoscopy, or Colorguard if applicable, Take medications as prescribed  Nurtrition Actions: Read nutrition labels, Recommend weight loss, Decrease intake of fast food  Tobacco Actions: Patient will not stop using tobacco recommend reducing amt of consumption per day

## 2023-04-03 PROBLEM — Z13.6 SCREENING FOR CARDIOVASCULAR CONDITION: Status: RESOLVED | Noted: 2022-12-28 | Resolved: 2023-04-03

## 2023-05-31 ENCOUNTER — PATIENT OUTREACH (OUTPATIENT)
Dept: ADMINISTRATIVE | Facility: HOSPITAL | Age: 48
End: 2023-05-31

## 2023-05-31 ENCOUNTER — PATIENT MESSAGE (OUTPATIENT)
Dept: ADMINISTRATIVE | Facility: HOSPITAL | Age: 48
End: 2023-05-31

## 2023-05-31 NOTE — PROGRESS NOTES
Tried to call pt regarding his cologuard. He did not answer so I left a voicemail. Will also send a portal message

## 2023-07-20 ENCOUNTER — PATIENT MESSAGE (OUTPATIENT)
Dept: ADMINISTRATIVE | Facility: HOSPITAL | Age: 48
End: 2023-07-20

## 2023-08-11 ENCOUNTER — TELEPHONE (OUTPATIENT)
Dept: FAMILY MEDICINE | Facility: CLINIC | Age: 48
End: 2023-08-11
Payer: COMMERCIAL

## 2023-08-11 NOTE — TELEPHONE ENCOUNTER
----- Message from Nito Junior IV, DO sent at 8/10/2023  2:09 PM CDT -----  Patient is due visit for diabetes.    He has not been in the last year.  Does he plan to continue seeing his?

## 2023-08-14 NOTE — TELEPHONE ENCOUNTER
Unfortunately, patient has lost their medical coverage.  Patient is unable to attend scheduled medical care.  May be re-evaluated as soon as patient is able to return.

## 2023-09-21 ENCOUNTER — PATIENT OUTREACH (OUTPATIENT)
Dept: ADMINISTRATIVE | Facility: HOSPITAL | Age: 48
End: 2023-09-21

## 2023-09-21 NOTE — LETTER
2023    Wallace Pedro  [id number]  [place] [address]              Dear Mr. Pedro:    Good afternoon. It has been brought to our attention that you have not done your cologuard test yet. It does  on 2023. Please turn it back in. If you have any questions please call and let us know how we can help you      If you have any questions or concerns, please don't hesitate to call.    Sincerely,        Kale Valencia LPN  Care Coordinator  Phone [phone]  Fax [phone]

## 2023-12-22 ENCOUNTER — HOSPITAL ENCOUNTER (EMERGENCY)
Facility: HOSPITAL | Age: 48
Discharge: HOME | End: 2023-12-22

## 2023-12-22 VITALS
SYSTOLIC BLOOD PRESSURE: 220 MMHG | RESPIRATION RATE: 18 BRPM | HEIGHT: 69 IN | HEART RATE: 81 BPM | WEIGHT: 239 LBS | TEMPERATURE: 99 F | BODY MASS INDEX: 35.4 KG/M2 | DIASTOLIC BLOOD PRESSURE: 120 MMHG | OXYGEN SATURATION: 97 %

## 2023-12-22 DIAGNOSIS — K04.7 DENTAL ABSCESS: Primary | ICD-10-CM

## 2023-12-22 DIAGNOSIS — K08.89 PAIN, DENTAL: ICD-10-CM

## 2023-12-22 PROCEDURE — 25000003 PHARM REV CODE 250: Performed by: NURSE PRACTITIONER

## 2023-12-22 PROCEDURE — 99284 PR EMERGENCY DEPT VISIT,LEVEL IV: ICD-10-PCS | Mod: ,,, | Performed by: NURSE PRACTITIONER

## 2023-12-22 PROCEDURE — 96372 THER/PROPH/DIAG INJ SC/IM: CPT | Performed by: NURSE PRACTITIONER

## 2023-12-22 PROCEDURE — 99284 EMERGENCY DEPT VISIT MOD MDM: CPT | Mod: ,,, | Performed by: NURSE PRACTITIONER

## 2023-12-22 PROCEDURE — 99284 EMERGENCY DEPT VISIT MOD MDM: CPT

## 2023-12-22 PROCEDURE — 63600175 PHARM REV CODE 636 W HCPCS: Performed by: NURSE PRACTITIONER

## 2023-12-22 RX ORDER — LIDOCAINE HYDROCHLORIDE 10 MG/ML
2.1 INJECTION INFILTRATION; PERINEURAL
Status: COMPLETED | OUTPATIENT
Start: 2023-12-22 | End: 2023-12-22

## 2023-12-22 RX ORDER — CEFTRIAXONE 1 G/1
1 INJECTION, POWDER, FOR SOLUTION INTRAMUSCULAR; INTRAVENOUS
Status: COMPLETED | OUTPATIENT
Start: 2023-12-22 | End: 2023-12-22

## 2023-12-22 RX ORDER — AMOXICILLIN AND CLAVULANATE POTASSIUM 875; 125 MG/1; MG/1
1 TABLET, FILM COATED ORAL 2 TIMES DAILY
Qty: 20 TABLET | Refills: 0 | Status: SHIPPED | OUTPATIENT
Start: 2023-12-22 | End: 2024-01-01

## 2023-12-22 RX ORDER — KETOROLAC TROMETHAMINE 30 MG/ML
30 INJECTION, SOLUTION INTRAMUSCULAR; INTRAVENOUS
Status: COMPLETED | OUTPATIENT
Start: 2023-12-22 | End: 2023-12-22

## 2023-12-22 RX ADMIN — KETOROLAC TROMETHAMINE 30 MG: 30 INJECTION, SOLUTION INTRAMUSCULAR; INTRAVENOUS at 07:12

## 2023-12-22 RX ADMIN — LIDOCAINE HYDROCHLORIDE 2.1 ML: 10 INJECTION, SOLUTION INFILTRATION; PERINEURAL at 08:12

## 2023-12-22 RX ADMIN — CEFTRIAXONE SODIUM 1 G: 1 INJECTION, POWDER, FOR SOLUTION INTRAMUSCULAR; INTRAVENOUS at 08:12

## 2023-12-23 NOTE — DISCHARGE INSTRUCTIONS
Take Augmentin as prescribed for all 10 days. Take Ibuprofen 600-800 mg every 8 hours as needed for pain. May also take Tylenol 650 mg every 6 hours as needed. Warm salt water swishes 4-6 times per day. Use topical oral analgesic such as orajel as needed for pain. As discussed, the antibiotics prescribed will likely only improve your symptoms temporarily. You need to follow-up with your dentist for further evaluation and treatment.

## 2023-12-23 NOTE — ED PROVIDER NOTES
Encounter Date: 2023       History     Chief Complaint   Patient presents with    Dental Pain     Presented with c/o left lower tooth pain and abscess that has been present for 2-3 days. Has been taking OTC pain med with no improvement. Reports previous episodes with same tooth but has not seen dentist for evaluation yet. Denies any n/v, fever or chills, headache, dizziness, chest pain, or dyspnea. PMH of HTN but notes has not taken his BP meds in over a year. States that he has some meds at home but just does not take them.      Review of patient's allergies indicates:  No Known Allergies  Past Medical History:   Diagnosis Date    Diabetes mellitus, type 2     Hyperlipidemia     Hypertension      History reviewed. No pertinent surgical history.  Family History   Problem Relation Age of Onset    Heart disease Mother     Diabetes Father     Hypertension Father      Social History     Tobacco Use    Smoking status: Former     Current packs/day: 0.00     Types: Cigarettes, Vaping with nicotine     Quit date:      Years since quittin.9    Smokeless tobacco: Never   Substance Use Topics    Alcohol use: Not Currently     Alcohol/week: 8.0 standard drinks of alcohol     Types: 8 Cans of beer per week    Drug use: Never     Review of Systems   Constitutional:  Positive for activity change and appetite change. Negative for fever.   HENT:  Positive for dental problem. Negative for congestion, sore throat and trouble swallowing.    Respiratory:  Negative for cough and shortness of breath.    Cardiovascular:  Negative for chest pain, palpitations and leg swelling.   Gastrointestinal:  Negative for abdominal pain, diarrhea, nausea and vomiting.   Genitourinary: Negative.    Musculoskeletal: Negative.    Skin: Negative.    Neurological:  Negative for syncope, speech difficulty, weakness and headaches.   Psychiatric/Behavioral: Negative.         Physical Exam     Initial Vitals   BP Pulse Resp Temp SpO2   23  1929 12/22/23 1929 12/22/23 1929 12/22/23 1929 12/22/23 2014   (!) 214/123 80 18 98.5 °F (36.9 °C) 96 %      MAP       --                Physical Exam    Nursing note and vitals reviewed.  Constitutional: He appears well-developed and well-nourished. No distress.   HENT:   Head: Normocephalic.   Right Ear: External ear normal.   Left Ear: External ear normal.   Nose: Nose normal.   Mouth/Throat: Dental caries present.       Eyes: Conjunctivae and EOM are normal. Pupils are equal, round, and reactive to light.   Neck: Neck supple.   Normal range of motion.  Cardiovascular:  Normal rate, regular rhythm, normal heart sounds and intact distal pulses.           No murmur heard.  Pulmonary/Chest: Breath sounds normal. He has no wheezes. He has no rhonchi. He has no rales.   Abdominal: Abdomen is soft. Bowel sounds are normal. There is no abdominal tenderness.   Musculoskeletal:         General: Normal range of motion.      Cervical back: Normal range of motion and neck supple.     Neurological: He is alert and oriented to person, place, and time. He has normal strength. GCS score is 15. GCS eye subscore is 4. GCS verbal subscore is 5. GCS motor subscore is 6.   Skin: Skin is warm and dry. Capillary refill takes less than 2 seconds.   Psychiatric: He has a normal mood and affect.         Medical Screening Exam   See Full Note    ED Course   Procedures  Labs Reviewed - No data to display       Imaging Results    None          Medications   ketorolac injection 30 mg (30 mg Intramuscular Given 12/22/23 1959)   cefTRIAXone injection 1 g (1 g Intramuscular Given 12/22/23 2001)   LIDOcaine HCL 10 mg/ml (1%) injection 2.1 mL (2.1 mLs Intramuscular Given 12/22/23 2001)     Medical Decision Making  Presented with c/o left lower tooth pain and abscess that has been present for 2-3 days. Has been taking OTC pain med with no improvement. Reports previous episodes with same tooth but has not seen dentist for evaluation yet. Denies any  n/v, fever or chills, headache, dizziness, chest pain, or dyspnea. PMH of HTN but notes has not taken his BP meds in over a year. States that he has some meds at home but just does not take them.    Risk  Prescription drug management.  Risk Details: Rocephin 1 Gm IM, Toradol 30 mg IM given in the ED. Instructed that antibiotics will only provide temporary results and will need to follow-up with dentist for further treatment. Rx for Augmentin. Discharged home with detailed instructions provided.                                      Clinical Impression:   Final diagnoses:  [K04.7] Dental abscess (Primary)  [K08.89] Pain, dental        ED Disposition Condition    Discharge Stable          ED Prescriptions       Medication Sig Dispense Start Date End Date Auth. Provider    amoxicillin-clavulanate 875-125mg (AUGMENTIN) 875-125 mg per tablet Take 1 tablet by mouth 2 (two) times daily. for 10 days 20 tablet 12/22/2023 1/1/2024 Ella Jules NP          Follow-up Information       Follow up With Specialties Details Why Contact Info    A dentist of your choosing.  In 1 week               Ella Jules NP  12/22/23 3951

## 2024-05-03 ENCOUNTER — HOSPITAL ENCOUNTER (INPATIENT)
Facility: HOSPITAL | Age: 49
LOS: 1 days | Discharge: HOME | DRG: 603 | End: 2024-05-07
Attending: FAMILY MEDICINE | Admitting: FAMILY MEDICINE
Payer: COMMERCIAL
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
## 2024-05-03 DIAGNOSIS — L03.119 CELLULITIS OF MULTIPLE SITES OF LOWER EXTREMITY: Primary | ICD-10-CM

## 2024-05-03 DIAGNOSIS — E11.65 TYPE 2 DIABETES MELLITUS WITH HYPERGLYCEMIA, WITHOUT LONG-TERM CURRENT USE OF INSULIN: ICD-10-CM

## 2024-05-03 DIAGNOSIS — E78.49 OTHER HYPERLIPIDEMIA: ICD-10-CM

## 2024-05-03 DIAGNOSIS — Z91.89 AT RISK FOR ACUTE ISCHEMIC CARDIAC EVENT: ICD-10-CM

## 2024-05-03 DIAGNOSIS — I10 PRIMARY HYPERTENSION: ICD-10-CM

## 2024-05-03 DIAGNOSIS — I10 ESSENTIAL HYPERTENSION: ICD-10-CM

## 2024-05-03 DIAGNOSIS — R73.9 HYPERGLYCEMIA: ICD-10-CM

## 2024-05-03 DIAGNOSIS — E83.42 HYPOMAGNESEMIA: ICD-10-CM

## 2024-05-03 DIAGNOSIS — L03.90 CELLULITIS: ICD-10-CM

## 2024-05-03 DIAGNOSIS — E11.9 TYPE 2 DIABETES MELLITUS WITHOUT COMPLICATION, UNSPECIFIED WHETHER LONG TERM INSULIN USE: ICD-10-CM

## 2024-05-03 LAB
ACETONE SERPL QL SCN: NEGATIVE
ALBUMIN SERPL BCP-MCNC: 3 G/DL (ref 3.5–5)
ALBUMIN/GLOB SERPL: 0.8 {RATIO}
ALP SERPL-CCNC: 186 U/L (ref 45–115)
ALT SERPL W P-5'-P-CCNC: 23 U/L (ref 16–61)
ANION GAP SERPL CALCULATED.3IONS-SCNC: 11 MMOL/L (ref 7–16)
AST SERPL W P-5'-P-CCNC: 12 U/L (ref 15–37)
BASOPHILS # BLD AUTO: 0.02 K/UL (ref 0–0.2)
BASOPHILS NFR BLD AUTO: 0.3 % (ref 0–1)
BILIRUB SERPL-MCNC: 0.4 MG/DL (ref ?–1.2)
BILIRUB UR QL STRIP: NEGATIVE
BUN SERPL-MCNC: 10 MG/DL (ref 7–18)
BUN/CREAT SERPL: 11 (ref 6–20)
CALCIUM SERPL-MCNC: 8.4 MG/DL (ref 8.5–10.1)
CHLORIDE SERPL-SCNC: 98 MMOL/L (ref 98–107)
CLARITY UR: CLEAR
CO2 SERPL-SCNC: 27 MMOL/L (ref 21–32)
COLOR UR: ABNORMAL
CREAT SERPL-MCNC: 0.93 MG/DL (ref 0.7–1.3)
DIFFERENTIAL METHOD BLD: ABNORMAL
EGFR (NO RACE VARIABLE) (RUSH/TITUS): 103 ML/MIN/1.73M2
EOSINOPHIL # BLD AUTO: 0.08 K/UL (ref 0–0.5)
EOSINOPHIL NFR BLD AUTO: 1.2 % (ref 1–4)
ERYTHROCYTE [DISTWIDTH] IN BLOOD BY AUTOMATED COUNT: 12 % (ref 11.5–14.5)
ERYTHROCYTE [SEDIMENTATION RATE] IN BLOOD BY WESTERGREN METHOD: 32 MM/HR (ref 0–10)
GLOBULIN SER-MCNC: 3.8 G/DL (ref 2–4)
GLUCOSE SERPL-MCNC: 437 MG/DL (ref 70–105)
GLUCOSE SERPL-MCNC: 439 MG/DL (ref 74–106)
GLUCOSE SERPL-MCNC: 455 MG/DL (ref 70–105)
GLUCOSE UR STRIP-MCNC: 500 MG/DL
HCO3 UR-SCNC: 27.7 MMOL/L (ref 24–28)
HCT VFR BLD AUTO: 39.3 % (ref 40–54)
HGB BLD-MCNC: 13.9 G/DL (ref 13.5–18)
KETONES UR STRIP-SCNC: NEGATIVE MG/DL
LACTATE SERPL-SCNC: 2 MMOL/L (ref 0.4–2)
LEUKOCYTE ESTERASE UR QL STRIP: NEGATIVE
LYMPHOCYTES # BLD AUTO: 2.33 K/UL (ref 1–4.8)
LYMPHOCYTES NFR BLD AUTO: 35 % (ref 27–41)
MAGNESIUM SERPL-MCNC: 1.6 MG/DL (ref 1.7–2.3)
MCH RBC QN AUTO: 32.7 PG (ref 27–31)
MCHC RBC AUTO-ENTMCNC: 35.4 G/DL (ref 32–36)
MCV RBC AUTO: 92.5 FL (ref 80–96)
MONOCYTES # BLD AUTO: 0.66 K/UL (ref 0–0.8)
MONOCYTES NFR BLD AUTO: 9.9 % (ref 2–6)
MPC BLD CALC-MCNC: 11.1 FL (ref 9.4–12.4)
NEUTROPHILS # BLD AUTO: 3.57 K/UL (ref 1.8–7.7)
NEUTROPHILS NFR BLD AUTO: 53.6 % (ref 53–65)
NITRITE UR QL STRIP: NEGATIVE
PCO2 BLDA: 48 MMHG (ref 41–51)
PH SMN: 7.37 [PH] (ref 7.32–7.42)
PH UR STRIP: 6 PH UNITS
PLATELET # BLD AUTO: 161 K/UL (ref 150–400)
PO2 BLDA: 30 MMHG (ref 25–40)
POC BASE EXCESS: 1.8 MMOL/L (ref -2–3)
POC CO2: 29.2 MMOL/L
POC SATURATED O2: 55 %
POTASSIUM SERPL-SCNC: 3.8 MMOL/L (ref 3.5–5.1)
PROT SERPL-MCNC: 6.8 G/DL (ref 6.4–8.2)
PROT UR QL STRIP: NEGATIVE
RBC # BLD AUTO: 4.25 M/UL (ref 4.6–6.2)
RBC # UR STRIP: NEGATIVE /UL
SODIUM SERPL-SCNC: 132 MMOL/L (ref 136–145)
SP GR UR STRIP: 1.01
UROBILINOGEN UR STRIP-ACNC: 0.2 MG/DL
WBC # BLD AUTO: 6.66 K/UL (ref 4.5–11)

## 2024-05-03 PROCEDURE — 90715 TDAP VACCINE 7 YRS/> IM: CPT

## 2024-05-03 PROCEDURE — 82803 BLOOD GASES ANY COMBINATION: CPT

## 2024-05-03 PROCEDURE — 25000003 PHARM REV CODE 250

## 2024-05-03 PROCEDURE — 3E0234Z INTRODUCTION OF SERUM, TOXOID AND VACCINE INTO MUSCLE, PERCUTANEOUS APPROACH: ICD-10-PCS | Performed by: FAMILY MEDICINE

## 2024-05-03 PROCEDURE — 86140 C-REACTIVE PROTEIN: CPT

## 2024-05-03 PROCEDURE — 96365 THER/PROPH/DIAG IV INF INIT: CPT

## 2024-05-03 PROCEDURE — 85025 COMPLETE CBC W/AUTO DIFF WBC: CPT

## 2024-05-03 PROCEDURE — 82009 KETONE BODYS QUAL: CPT

## 2024-05-03 PROCEDURE — 83605 ASSAY OF LACTIC ACID: CPT

## 2024-05-03 PROCEDURE — 83036 HEMOGLOBIN GLYCOSYLATED A1C: CPT

## 2024-05-03 PROCEDURE — 87040 BLOOD CULTURE FOR BACTERIA: CPT

## 2024-05-03 PROCEDURE — 96368 THER/DIAG CONCURRENT INF: CPT

## 2024-05-03 PROCEDURE — 85651 RBC SED RATE NONAUTOMATED: CPT

## 2024-05-03 PROCEDURE — 63600175 PHARM REV CODE 636 W HCPCS: Mod: JZ,TB

## 2024-05-03 PROCEDURE — 99285 EMERGENCY DEPT VISIT HI MDM: CPT | Mod: 25

## 2024-05-03 PROCEDURE — 83735 ASSAY OF MAGNESIUM: CPT

## 2024-05-03 PROCEDURE — 80053 COMPREHEN METABOLIC PANEL: CPT

## 2024-05-03 PROCEDURE — 87070 CULTURE OTHR SPECIMN AEROBIC: CPT

## 2024-05-03 PROCEDURE — 90471 IMMUNIZATION ADMIN: CPT

## 2024-05-03 PROCEDURE — 81003 URINALYSIS AUTO W/O SCOPE: CPT

## 2024-05-03 PROCEDURE — 99285 EMERGENCY DEPT VISIT HI MDM: CPT | Mod: ,,,

## 2024-05-03 PROCEDURE — 82962 GLUCOSE BLOOD TEST: CPT

## 2024-05-03 PROCEDURE — 36415 COLL VENOUS BLD VENIPUNCTURE: CPT

## 2024-05-03 RX ORDER — METFORMIN HYDROCHLORIDE 500 MG/1
500 TABLET ORAL 2 TIMES DAILY WITH MEALS
Status: ON HOLD | COMMUNITY
End: 2024-05-07

## 2024-05-03 RX ORDER — MAGNESIUM SULFATE HEPTAHYDRATE 40 MG/ML
2 INJECTION, SOLUTION INTRAVENOUS
Status: COMPLETED | OUTPATIENT
Start: 2024-05-03 | End: 2024-05-03

## 2024-05-03 RX ORDER — CLINDAMYCIN PHOSPHATE 600 MG/50ML
600 INJECTION, SOLUTION INTRAVENOUS
Status: COMPLETED | OUTPATIENT
Start: 2024-05-03 | End: 2024-05-03

## 2024-05-03 RX ADMIN — SODIUM CHLORIDE 1000 ML: 9 INJECTION, SOLUTION INTRAVENOUS at 09:05

## 2024-05-03 RX ADMIN — TETANUS TOXOID, REDUCED DIPHTHERIA TOXOID AND ACELLULAR PERTUSSIS VACCINE, ADSORBED 0.5 ML: 5; 2.5; 8; 8; 2.5 SUSPENSION INTRAMUSCULAR at 09:05

## 2024-05-03 RX ADMIN — CLINDAMYCIN PHOSPHATE 600 MG: 600 INJECTION, SOLUTION INTRAVENOUS at 10:05

## 2024-05-03 RX ADMIN — MAGNESIUM SULFATE HEPTAHYDRATE 2 G: 40 INJECTION, SOLUTION INTRAVENOUS at 10:05

## 2024-05-04 PROBLEM — E78.49 OTHER HYPERLIPIDEMIA: Status: ACTIVE | Noted: 2024-05-04

## 2024-05-04 PROBLEM — E11.65 TYPE 2 DIABETES MELLITUS WITH HYPERGLYCEMIA, WITHOUT LONG-TERM CURRENT USE OF INSULIN: Status: ACTIVE | Noted: 2024-05-04

## 2024-05-04 PROBLEM — L03.119 CELLULITIS OF MULTIPLE SITES OF LOWER EXTREMITY: Status: ACTIVE | Noted: 2024-05-04

## 2024-05-04 LAB
ANION GAP SERPL CALCULATED.3IONS-SCNC: 10 MMOL/L (ref 7–16)
BASOPHILS # BLD AUTO: 0.01 K/UL (ref 0–0.2)
BASOPHILS NFR BLD AUTO: 0.2 % (ref 0–1)
BUN SERPL-MCNC: 10 MG/DL (ref 7–18)
BUN/CREAT SERPL: 18 (ref 6–20)
CALCIUM SERPL-MCNC: 7.5 MG/DL (ref 8.5–10.1)
CHLORIDE SERPL-SCNC: 102 MMOL/L (ref 98–107)
CHOLEST SERPL-MCNC: 171 MG/DL (ref 0–200)
CHOLEST/HDLC SERPL: 5.7 {RATIO}
CO2 SERPL-SCNC: 27 MMOL/L (ref 21–32)
CREAT SERPL-MCNC: 0.57 MG/DL (ref 0.7–1.3)
CRP SERPL-MCNC: 1.62 MG/DL (ref 0–0.8)
DIFFERENTIAL METHOD BLD: ABNORMAL
EGFR (NO RACE VARIABLE) (RUSH/TITUS): 122 ML/MIN/1.73M2
EOSINOPHIL # BLD AUTO: 0.07 K/UL (ref 0–0.5)
EOSINOPHIL NFR BLD AUTO: 1.3 % (ref 1–4)
ERYTHROCYTE [DISTWIDTH] IN BLOOD BY AUTOMATED COUNT: 12.1 % (ref 11.5–14.5)
EST. AVERAGE GLUCOSE BLD GHB EST-MCNC: 295 MG/DL
GLUCOSE SERPL-MCNC: 297 MG/DL (ref 70–105)
GLUCOSE SERPL-MCNC: 305 MG/DL (ref 70–105)
GLUCOSE SERPL-MCNC: 305 MG/DL (ref 74–106)
GLUCOSE SERPL-MCNC: 328 MG/DL (ref 70–105)
HBA1C MFR BLD HPLC: 11.9 % (ref 4.5–6.6)
HCT VFR BLD AUTO: 35.9 % (ref 40–54)
HDLC SERPL-MCNC: 30 MG/DL (ref 40–60)
HGB BLD-MCNC: 12.6 G/DL (ref 13.5–18)
LDLC SERPL CALC-MCNC: 83 MG/DL
LDLC/HDLC SERPL: 2.8 {RATIO}
LYMPHOCYTES # BLD AUTO: 1.79 K/UL (ref 1–4.8)
LYMPHOCYTES NFR BLD AUTO: 33 % (ref 27–41)
MAGNESIUM SERPL-MCNC: 1.8 MG/DL (ref 1.7–2.3)
MCH RBC QN AUTO: 33 PG (ref 27–31)
MCHC RBC AUTO-ENTMCNC: 35.1 G/DL (ref 32–36)
MCV RBC AUTO: 94 FL (ref 80–96)
MONOCYTES # BLD AUTO: 0.55 K/UL (ref 0–0.8)
MONOCYTES NFR BLD AUTO: 10.1 % (ref 2–6)
MPC BLD CALC-MCNC: 10.1 FL (ref 9.4–12.4)
NEUTROPHILS # BLD AUTO: 3.01 K/UL (ref 1.8–7.7)
NEUTROPHILS NFR BLD AUTO: 55.4 % (ref 53–65)
NONHDLC SERPL-MCNC: 141 MG/DL
PLATELET # BLD AUTO: 134 K/UL (ref 150–400)
POTASSIUM SERPL-SCNC: 3.7 MMOL/L (ref 3.5–5.1)
RBC # BLD AUTO: 3.82 M/UL (ref 4.6–6.2)
SODIUM SERPL-SCNC: 135 MMOL/L (ref 136–145)
TRIGL SERPL-MCNC: 292 MG/DL (ref 35–150)
VLDLC SERPL-MCNC: 58 MG/DL
WBC # BLD AUTO: 5.43 K/UL (ref 4.5–11)

## 2024-05-04 PROCEDURE — 80048 BASIC METABOLIC PNL TOTAL CA: CPT

## 2024-05-04 PROCEDURE — 27000944

## 2024-05-04 PROCEDURE — 96366 THER/PROPH/DIAG IV INF ADDON: CPT

## 2024-05-04 PROCEDURE — 83735 ASSAY OF MAGNESIUM: CPT

## 2024-05-04 PROCEDURE — 63600175 PHARM REV CODE 636 W HCPCS: Mod: JZ,TB

## 2024-05-04 PROCEDURE — 80061 LIPID PANEL: CPT

## 2024-05-04 PROCEDURE — 94761 N-INVAS EAR/PLS OXIMETRY MLT: CPT

## 2024-05-04 PROCEDURE — 25000003 PHARM REV CODE 250

## 2024-05-04 PROCEDURE — G0378 HOSPITAL OBSERVATION PER HR: HCPCS

## 2024-05-04 PROCEDURE — 27000982 HC MATTRESS, MATRIX LAL RENTAL

## 2024-05-04 PROCEDURE — 25000003 PHARM REV CODE 250: Performed by: FAMILY MEDICINE

## 2024-05-04 PROCEDURE — 96361 HYDRATE IV INFUSION ADD-ON: CPT

## 2024-05-04 PROCEDURE — 85025 COMPLETE CBC W/AUTO DIFF WBC: CPT

## 2024-05-04 PROCEDURE — 82962 GLUCOSE BLOOD TEST: CPT

## 2024-05-04 PROCEDURE — 96372 THER/PROPH/DIAG INJ SC/IM: CPT

## 2024-05-04 PROCEDURE — 36415 COLL VENOUS BLD VENIPUNCTURE: CPT

## 2024-05-04 RX ORDER — SODIUM CHLORIDE 0.9 % (FLUSH) 0.9 %
10 SYRINGE (ML) INJECTION
Status: DISCONTINUED | OUTPATIENT
Start: 2024-05-04 | End: 2024-05-07 | Stop reason: HOSPADM

## 2024-05-04 RX ORDER — ACETAMINOPHEN 325 MG/1
650 TABLET ORAL EVERY 8 HOURS PRN
Status: DISCONTINUED | OUTPATIENT
Start: 2024-05-04 | End: 2024-05-07 | Stop reason: HOSPADM

## 2024-05-04 RX ORDER — HYDROCODONE BITARTRATE AND ACETAMINOPHEN 5; 325 MG/1; MG/1
1 TABLET ORAL EVERY 4 HOURS PRN
Status: DISCONTINUED | OUTPATIENT
Start: 2024-05-04 | End: 2024-05-07 | Stop reason: HOSPADM

## 2024-05-04 RX ORDER — INSULIN ASPART 100 [IU]/ML
0-10 INJECTION, SOLUTION INTRAVENOUS; SUBCUTANEOUS
Status: DISCONTINUED | OUTPATIENT
Start: 2024-05-04 | End: 2024-05-07 | Stop reason: HOSPADM

## 2024-05-04 RX ORDER — TALC
6 POWDER (GRAM) TOPICAL NIGHTLY PRN
Status: DISCONTINUED | OUTPATIENT
Start: 2024-05-04 | End: 2024-05-07 | Stop reason: HOSPADM

## 2024-05-04 RX ORDER — IBUPROFEN 200 MG
24 TABLET ORAL
Status: DISCONTINUED | OUTPATIENT
Start: 2024-05-04 | End: 2024-05-07 | Stop reason: HOSPADM

## 2024-05-04 RX ORDER — SODIUM CHLORIDE 9 MG/ML
INJECTION, SOLUTION INTRAVENOUS CONTINUOUS
Status: DISCONTINUED | OUTPATIENT
Start: 2024-05-04 | End: 2024-05-06

## 2024-05-04 RX ORDER — LOSARTAN POTASSIUM AND HYDROCHLOROTHIAZIDE 12.5; 5 MG/1; MG/1
1 TABLET ORAL DAILY
Status: DISCONTINUED | OUTPATIENT
Start: 2024-05-04 | End: 2024-05-04

## 2024-05-04 RX ORDER — ATORVASTATIN CALCIUM 40 MG/1
40 TABLET, FILM COATED ORAL NIGHTLY
Status: DISCONTINUED | OUTPATIENT
Start: 2024-05-04 | End: 2024-05-07 | Stop reason: HOSPADM

## 2024-05-04 RX ORDER — ENOXAPARIN SODIUM 100 MG/ML
40 INJECTION SUBCUTANEOUS EVERY 24 HOURS
Status: DISCONTINUED | OUTPATIENT
Start: 2024-05-04 | End: 2024-05-07 | Stop reason: HOSPADM

## 2024-05-04 RX ORDER — IBUPROFEN 200 MG
16 TABLET ORAL
Status: DISCONTINUED | OUTPATIENT
Start: 2024-05-04 | End: 2024-05-07 | Stop reason: HOSPADM

## 2024-05-04 RX ORDER — CLINDAMYCIN PHOSPHATE 600 MG/50ML
600 INJECTION, SOLUTION INTRAVENOUS
Status: DISCONTINUED | OUTPATIENT
Start: 2024-05-04 | End: 2024-05-07 | Stop reason: HOSPADM

## 2024-05-04 RX ORDER — LOSARTAN POTASSIUM 50 MG/1
50 TABLET ORAL DAILY
Status: DISCONTINUED | OUTPATIENT
Start: 2024-05-04 | End: 2024-05-07 | Stop reason: HOSPADM

## 2024-05-04 RX ORDER — HYDROCHLOROTHIAZIDE 12.5 MG/1
12.5 TABLET ORAL DAILY
Status: DISCONTINUED | OUTPATIENT
Start: 2024-05-04 | End: 2024-05-07 | Stop reason: HOSPADM

## 2024-05-04 RX ORDER — GLUCAGON 1 MG
1 KIT INJECTION
Status: DISCONTINUED | OUTPATIENT
Start: 2024-05-04 | End: 2024-05-07 | Stop reason: HOSPADM

## 2024-05-04 RX ADMIN — INSULIN ASPART 6 UNITS: 100 INJECTION, SOLUTION INTRAVENOUS; SUBCUTANEOUS at 05:05

## 2024-05-04 RX ADMIN — INSULIN ASPART 5 UNITS: 100 INJECTION, SOLUTION INTRAVENOUS; SUBCUTANEOUS at 08:05

## 2024-05-04 RX ADMIN — INSULIN ASPART 8 UNITS: 100 INJECTION, SOLUTION INTRAVENOUS; SUBCUTANEOUS at 06:05

## 2024-05-04 RX ADMIN — CLINDAMYCIN PHOSPHATE 600 MG: 600 INJECTION, SOLUTION INTRAVENOUS at 06:05

## 2024-05-04 RX ADMIN — HYDROCODONE BITARTRATE AND ACETAMINOPHEN 1 TABLET: 5; 325 TABLET ORAL at 06:05

## 2024-05-04 RX ADMIN — SODIUM CHLORIDE: 9 INJECTION, SOLUTION INTRAVENOUS at 06:05

## 2024-05-04 RX ADMIN — CLINDAMYCIN PHOSPHATE 600 MG: 600 INJECTION, SOLUTION INTRAVENOUS at 11:05

## 2024-05-04 RX ADMIN — CLINDAMYCIN PHOSPHATE 600 MG: 600 INJECTION, SOLUTION INTRAVENOUS at 03:05

## 2024-05-04 RX ADMIN — ATORVASTATIN CALCIUM 40 MG: 40 TABLET, FILM COATED ORAL at 08:05

## 2024-05-04 RX ADMIN — INSULIN ASPART 8 UNITS: 100 INJECTION, SOLUTION INTRAVENOUS; SUBCUTANEOUS at 11:05

## 2024-05-04 RX ADMIN — SODIUM CHLORIDE: 9 INJECTION, SOLUTION INTRAVENOUS at 09:05

## 2024-05-04 RX ADMIN — ENOXAPARIN SODIUM 40 MG: 40 INJECTION SUBCUTANEOUS at 05:05

## 2024-05-04 RX ADMIN — LOSARTAN POTASSIUM 50 MG: 50 TABLET, FILM COATED ORAL at 08:05

## 2024-05-04 RX ADMIN — SODIUM CHLORIDE: 9 INJECTION, SOLUTION INTRAVENOUS at 01:05

## 2024-05-04 RX ADMIN — HYDROCHLOROTHIAZIDE 12.5 MG: 12.5 TABLET ORAL at 08:05

## 2024-05-04 NOTE — PLAN OF CARE
Problem: Adult Inpatient Plan of Care  Goal: Plan of Care Review  Outcome: Progressing  Goal: Patient-Specific Goal (Individualized)  Outcome: Progressing  Goal: Absence of Hospital-Acquired Illness or Injury  Outcome: Progressing  Goal: Optimal Comfort and Wellbeing  Outcome: Progressing  Goal: Readiness for Transition of Care  Outcome: Progressing     Problem: Wound  Goal: Optimal Coping  Outcome: Progressing  Goal: Optimal Functional Ability  Outcome: Progressing  Goal: Absence of Infection Signs and Symptoms  Outcome: Progressing  Goal: Improved Oral Intake  Outcome: Progressing  Goal: Optimal Pain Control and Function  Outcome: Progressing  Goal: Skin Health and Integrity  Outcome: Progressing  Goal: Optimal Wound Healing  Outcome: Progressing     Problem: Diabetes Comorbidity  Goal: Blood Glucose Level Within Targeted Range  Outcome: Progressing

## 2024-05-04 NOTE — ED PROVIDER NOTES
Encounter Date: 5/3/2024       History     Chief Complaint   Patient presents with    Leg Pain     Pt has bilateral lower leg swelling and redness x few weeks.     Patient is a 48-year-old male who presents to emergency department for evaluation of wounds to his lower bilateral extremities.  He reports that approximately 2 weeks prior his dogs scratched his legs.  He reports that he thought it would get better on its own.  He was recently been treating the wounds with peroxide and is now reporting that they are becoming more red and swollen especially in the right lower extremity.  He does have a history of hypertension, hyperlipidemia and poorly-controlled type 2 diabetes.  He does not regularly check his blood sugar and just recently began taking his metformin.  He states that he has not had a hemoglobin A1c were seen a provider in approximately 2 years.  He denies any fever, chills, weakness, chest pain, shortness of breath, or any other complaints at this time.  He is unsure of his tetanus status.  His blood pressure is 176/103, temperature 98°, heart rate 94, respirations 20, and oxygen saturation 96% on room air.  He appears in no acute distress.    The history is provided by the patient and a significant other.     Review of patient's allergies indicates:  No Known Allergies  Past Medical History:   Diagnosis Date    Diabetes mellitus, type 2     Hyperlipidemia     Hypertension      No past surgical history on file.  Family History   Problem Relation Name Age of Onset    Heart disease Mother      Diabetes Father      Hypertension Father       Social History     Tobacco Use    Smoking status: Every Day     Current packs/day: 0.00     Types: Vaping with nicotine, Cigarettes     Last attempt to quit: 2019     Years since quittin.3    Smokeless tobacco: Never   Substance Use Topics    Alcohol use: Not Currently     Comment: 6 pack a day    Drug use: Never     Review of Systems   Constitutional:  Negative for  activity change, appetite change, chills, fatigue and fever.   HENT:  Negative for congestion, sore throat and trouble swallowing.    Eyes: Negative.    Respiratory:  Negative for cough and shortness of breath.    Cardiovascular:  Positive for leg swelling. Negative for chest pain.   Gastrointestinal:  Negative for abdominal distention, abdominal pain, nausea and vomiting.   Endocrine: Negative for cold intolerance and heat intolerance.   Genitourinary:  Negative for difficulty urinating, dysuria and frequency.   Musculoskeletal:  Positive for arthralgias. Negative for back pain, neck pain and neck stiffness.   Skin:  Positive for wound (bilateral lower extremities). Negative for color change, pallor and rash.   Allergic/Immunologic: Negative.    Neurological:  Negative for dizziness, syncope, speech difficulty, weakness and headaches.   Hematological:  Does not bruise/bleed easily.   Psychiatric/Behavioral:  Negative for confusion. The patient is not nervous/anxious.    All other systems reviewed and are negative.      Physical Exam     Initial Vitals [05/03/24 2124]   BP Pulse Resp Temp SpO2   (!) 176/103 94 20 98 °F (36.7 °C) 96 %      MAP       --         Physical Exam    Nursing note and vitals reviewed.  Constitutional: He appears well-developed and well-nourished. He is not diaphoretic. No distress.   HENT:   Head: Normocephalic and atraumatic.   Mouth/Throat: Oropharynx is clear and moist.   Eyes: Conjunctivae and EOM are normal. Pupils are equal, round, and reactive to light.   Neck: Neck supple.   Normal range of motion.  Cardiovascular:  Normal rate, regular rhythm, normal heart sounds and intact distal pulses.           Pulmonary/Chest: Breath sounds normal. No respiratory distress. He has no wheezes. He has no rhonchi. He has no rales. He exhibits no tenderness.   Abdominal: Abdomen is soft. Bowel sounds are normal. There is no abdominal tenderness. There is no rebound and no guarding.    Musculoskeletal:         General: Normal range of motion.      Cervical back: Normal range of motion and neck supple.      Right foot: Swelling (moderate) and tenderness present.      Left foot: Swelling (mild) and tenderness present.      Comments: Multiple abrasions with surrounding erythema to bilateral lower extremities.  Significant cellulitis to the medial aspect of the right lower extremity just proximal to the ankle.  Ulcerations noted just below the right knee and anterior portion of the right foot.  See photo documentation for further description.     Neurological: He is alert and oriented to person, place, and time. He has normal strength. GCS score is 15. GCS eye subscore is 4. GCS verbal subscore is 5. GCS motor subscore is 6.   Skin: Skin is warm and dry. Capillary refill takes less than 2 seconds.   Psychiatric: He has a normal mood and affect. His behavior is normal. Judgment and thought content normal.         Medical Screening Exam   See Full Note    ED Course   Procedures  Labs Reviewed   COMPREHENSIVE METABOLIC PANEL - Abnormal; Notable for the following components:       Result Value    Sodium 132 (*)     Glucose 439 (*)     Calcium 8.4 (*)     Albumin 3.0 (*)     Alk Phos 186 (*)     AST 12 (*)     All other components within normal limits   SEDIMENTATION RATE, AUTOMATED - Abnormal; Notable for the following components:    ESR Westergren 32 (*)     All other components within normal limits   URINALYSIS, REFLEX TO URINE CULTURE - Abnormal; Notable for the following components:    Glucose,  (*)     All other components within normal limits   MAGNESIUM - Abnormal; Notable for the following components:    Magnesium 1.6 (*)     All other components within normal limits   CBC WITH DIFFERENTIAL - Abnormal; Notable for the following components:    RBC 4.25 (*)     Hematocrit 39.3 (*)     MCH 32.7 (*)     Monocytes % 9.9 (*)     All other components within normal limits   POCT GLUCOSE MONITORING  CONTINUOUS - Abnormal; Notable for the following components:    POC Glucose 455 (*)     All other components within normal limits   POCT GLUCOSE MONITORING CONTINUOUS - Abnormal; Notable for the following components:    POC Glucose 437 (*)     All other components within normal limits   LACTIC ACID, PLASMA - Normal   CULTURE, BLOOD   CULTURE, BLOOD   CULTURE, WOUND   CBC W/ AUTO DIFFERENTIAL    Narrative:     The following orders were created for panel order CBC auto differential.  Procedure                               Abnormality         Status                     ---------                               -----------         ------                     CBC with Differential[[id number]]       Abnormal            Final result                 Please view results for these tests on the individual orders.   ACETONE   HEMOGLOBIN A1C   C-REACTIVE PROTEIN          Imaging Results              X-Ray Ankle Complete Right (In process)                      Medications   sodium chloride 0.9% bolus 1,000 mL 1,000 mL (0 mLs Intravenous Stopped 5/3/24 2239)   Tdap (BOOSTRIX) vaccine injection 0.5 mL (0.5 mLs Intramuscular Given 5/3/24 2152)   clindamycin in D5W 600 mg/50 mL IVPB 600 mg (0 mg Intravenous Stopped 5/3/24 2317)   magnesium sulfate 2g in water 50mL IVPB (premix) (0 g Intravenous Stopped 5/3/24 2305)     Medical Decision Making  Patient presents for evaluation of nonhealing wounds to bilateral lower extremities.  He is alert and oriented x4.  GCS 15.  Hypertensive with a blood pressure of 176/103 at the time of triage.  Afebrile with a temperature 98°.  Vital signs stable otherwise.  He is a type 2 diabetic and appears poorly controlled.  Point of care glucose today 455.  He is unsure of his tetanus status so we will provide him with a Boostrix 0.5 mL IM here in the emergency department.  We will initiate IV access, send urine sample, obtain lab specimens for further evaluation of wound infection and hyperglycemia, send  wound and blood cultures, perform an x-ray over the largest area of infection near the right ankle to rule out osteomyelitis, provide hydration with 1 L normal saline IV bolus,  and initiate antibiotic therapy with clindamycin 600 mg IV.  Magnesium was noted to be 1.6 we did replace with 2 g of IV magnesium here in the ED. Preliminary x-ray readings showed no acute fracture or subluxation and no mention of osteomyelitis.  Final radiology read pending at the time of disposition.  Treatment options were discussed with the patient and his family.  He does not currently have established primary care and no adequate timely follow up at this time available.  He has also been noncompliant with his home medications and for these reasons it was felt that he would benefit from observation hospital admission for treatment of cellulitis and nonketotic hyperglycemia.  Case was discussed with Dr. Urrutia at [facility] who was in agreement with the plan of admission under observation status.  We will continue IV fluid hydration, IV clindamycin, place him on a moderate scale of NovoLog, Lovenox for DVT prophylaxis, and resume his daily medications for hypertension and hyperlipidemia.  All questions were answered and the patient and his family are in agreement.    Amount and/or Complexity of Data Reviewed  Independent Historian:      Details: Patient is a 48-year-old male who presents to emergency department for evaluation of wounds to his lower bilateral extremities.  He reports that approximately 2 weeks prior his dogs scratched his legs.  He reports that he thought it would get better on its own.  He was recently been treating the wounds with peroxide and is now reporting that they are becoming more red and swollen especially in the right lower extremity.  He does have a history of hypertension, hyperlipidemia and poorly-controlled type 2 diabetes.  He does not regularly check his blood sugar and just recently began taking his  metformin.  He states that he has not had a hemoglobin A1c were seen a provider in approximately 2 years.  He denies any fever, chills, weakness, chest pain, shortness of breath, or any other complaints at this time.  He is unsure of his tetanus status.  His blood pressure is 176/103, temperature 98°, heart rate 94, respirations 20, and oxygen saturation 96% on room air.  He appears in no acute distress.  External Data Reviewed: labs.     Details: Prior A1C from 2022 noted to be 11.9  Prior triglycerides noted to be greater than 2300  Labs: ordered.     Details: CBC shows a WBC of 6.66, hemoglobin of 13.9, hematocrit 39.3, and a platelet count of 161.  Point of care glucose 455.  Serum ketone negative.  Magnesium 1.6.  CMP shows a sodium of 132, glucose of 439, calcium 8.4, albumin of 3.0, alk-phos of 186, AST of 12 but otherwise unremarkable.  Blood cultures x2 sent and pending.  Wound culture sent and pending.  VBG shows a normal pH of 7.37.  Lactic acid normal at 2.  Urinalysis shows 500 glucose but otherwise unremarkable.  Sed rate elevated at 32.  Radiology: ordered.     Details: X-ray of the ankle shows no acute fracture or subluxation on the preliminary reading.  There was also no mention of osteomyelitis.  Final radiology read pending.  Discussion of management or test interpretation with external provider(s): Case discussed with Dr. Urrutia at [facility] who is currently on call with the hospitalist group and she is in agreement with the plan of admission.     Risk  Prescription drug management.  Risk Details: All historical, clinical, radiographic, and laboratory findings were reviewed with the patient/family in detail along with the indications for transport to the facility in [place], [place] in order to receive further evaluation and treatment of cellulitis and hyperglycemia.  All remaining questions and concerns were addressed at this time and the patient/family communicates understanding and agrees to  proceed accordingly.  Similarly, all pertinent details of the encounter were discussed with Dr. Urrutia who agrees to receive the patient at [facility] for further care as outlined above.    GERRY YOUNG  12:22 AM                                      Clinical Impression:   Final diagnoses:  [L03.90] Cellulitis  [R73.9] Hyperglycemia  [E83.42] Hypomagnesemia  [L03.119] Cellulitis of multiple sites of lower extremity (Primary)  [W54.8XXA] Dog scratch        ED Disposition Condition    Observation Stable                Dayday Hernandez FNP  05/04/24 0026

## 2024-05-04 NOTE — H&P
[facility] - Medical Surgical Unit  Hospital Medicine  History & Physical    Patient Name: Wallace Pedro  MRN: [id number]  Patient Class: OP- Observation  Admission Date: 5/3/2024  Attending Physician: Nito Junior IV, DO   Primary Care Provider: Jamilah, Primary Doctor         Patient information was obtained from ER records.     Subjective:     Principal Problem:Cellulitis of multiple sites of lower extremity    Chief Complaint:   Chief Complaint   Patient presents with    Leg Pain     Pt has bilateral lower leg swelling and redness x few weeks.    Cellulitis        HPI: Patient is a 48-year-old male who presents to emergency department for evaluation of wounds to his lower bilateral extremities. He reports that approximately 2 weeks prior his dogs scratched his legs. He reports that he thought it would get better on its own. He was recently been treating the wounds with peroxide and is now reporting that they are becoming more red and swollen especially in the right lower extremity. He does have a history of hypertension, hyperlipidemia and poorly-controlled type 2 diabetes. He does not regularly check his blood sugar and just recently began taking his metformin. He states that he has not had a hemoglobin A1c were seen a provider in approximately 2 years. He denies any fever, chills, weakness, chest pain, shortness of breath, or any other complaints at this time. He is unsure of his tetanus status. His blood pressure is 176/103, temperature 98°, heart rate 94, respirations 20, and oxygen saturation 96% on room air. He appears in no acute distress.  He is alert and oriented x4. GCS 15. Hypertensive with a blood pressure of 176/103 at the time of triage. Afebrile with a temperature 98°. Vital signs stable otherwise. He is a type 2 diabetic and appears poorly controlled. Point of care glucose today 455. He is unsure of his tetanus status so we will provide him with a Boostrix 0.5 mL IM here in the  emergency department. We will initiate IV access, send urine sample, obtain lab specimens for further evaluation of wound infection and hyperglycemia, send wound and blood cultures, perform an x-ray over the largest area of infection near the right ankle to rule out osteomyelitis, provide hydration with 1 L normal saline IV bolus, and initiate antibiotic therapy with clindamycin 600 mg IV. Magnesium was noted to be 1.6 we did replace with 2 g of IV magnesium here in the ED. Preliminary x-ray readings showed no acute fracture or subluxation and no mention of osteomyelitis. Final radiology read pending at the time of disposition. Treatment options were discussed with the patient and his family. He does not currently have established primary care and no adequate timely follow up at this time available. He has also been noncompliant with his home medications and for these reasons it was felt that he would benefit from observation hospital admission for treatment of cellulitis and nonketotic hyperglycemia. Case was discussed with Dr. Urrutia at [facility] who was in agreement with the plan of admission under observation status. We will continue IV fluid hydration, IV clindamycin, place him on a moderate scale of NovoLog, Lovenox for DVT prophylaxis, and resume his daily medications for hypertension and hyperlipidemia. All questions were answered and the patient and his family are in agreement.     Past Medical History:   Diagnosis Date    Diabetes mellitus, type 2     Hyperlipidemia     Hypertension        No past surgical history on file.    Review of patient's allergies indicates:  No Known Allergies    Current Facility-Administered Medications   Medication Dose Route Frequency Provider Last Rate Last Admin    0.9%  NaCl infusion   Intravenous Continuous Dayday Hernandez  mL/hr at 05/04/24 0939 New Bag at 05/04/24 0939    acetaminophen tablet 650 mg  650 mg Oral Q8H PRN Dayday Hernandez FNP         atorvastatin tablet 40 mg  40 mg Oral QHS HernandezDayday JUDE, FNP        clindamycin in D5W 600 mg/50 mL IVPB 600 mg  600 mg Intravenous Q8H Dayday Hernandez JUDE, FNP   Stopped at 24 0643    dextrose 50% injection 12.5 g  12.5 g Intravenous PRN Dayday Hernandez JUDE, FNP        dextrose 50% injection 25 g  25 g Intravenous PRN HernandezDayday JUDE, FNP        enoxaparin injection 40 mg  40 mg Subcutaneous Daily HernandezDayday, FNP        glucagon (human recombinant) injection 1 mg  1 mg Intramuscular PRN HernandezDayday, FNP        glucose chewable tablet 16 g  16 g Oral PRN HernandezDayday JUDE, FNP        glucose chewable tablet 24 g  24 g Oral PRN Hernandez Dayday JUDE, MAHESHP        losartan tablet 50 mg  50 mg Oral Daily Nito Junior IV, DO   50 mg at 24 0837    And    hydroCHLOROthiazide tablet 12.5 mg  12.5 mg Oral Daily Nito Junior IV, DO   12.5 mg at 24 0837    HYDROcodone-acetaminophen 5-325 mg per tablet 1 tablet  1 tablet Oral Q4H PRN Dayday Hernandez, FNP   1 tablet at 24 0618    insulin aspart U-100 injection 0-10 Units  0-10 Units Subcutaneous QID (AC + HS) PRN HernandezDayday, FNP   8 Units at 24 1141    melatonin tablet 6 mg  6 mg Oral Nightly PRN HernandezDayday, FNP        sodium chloride 0.9% flush 10 mL  10 mL Intravenous PRN HernandezDayday JUDE, FNP         Family History       Problem Relation (Age of Onset)    Diabetes Father    Heart disease Mother    Hypertension Father          Tobacco Use    Smoking status: Every Day     Current packs/day: 0.00     Types: Vaping with nicotine, Cigarettes     Last attempt to quit: 2019     Years since quittin.3    Smokeless tobacco: Never   Substance and Sexual Activity    Alcohol use: Not Currently     Comment: 6 pack a day    Drug use: Never    Sexual activity: Not on file     Review of Systems   Constitutional: Negative.    Respiratory: Negative.     Cardiovascular: Negative.    Musculoskeletal:   Positive for arthralgias.        Lower extremity edema   Skin:  Positive for wound (multiple abrasions to lower extremities).   Neurological: Negative.    Psychiatric/Behavioral: Negative.     All other systems reviewed and are negative.    Objective:     Vital Signs (Most Recent):  Temp: 97.6 °F (36.4 °C) (05/04/24 1127)  Pulse: 72 (05/04/24 1127)  Resp: 20 (05/04/24 1127)  BP: (!) 166/93 (05/04/24 1127)  SpO2: 97 % (05/04/24 1127) Vital Signs (24h Range):  Temp:  [97.4 °F (36.3 °C)-98.1 °F (36.7 °C)] 97.6 °F (36.4 °C)  Pulse:  [62-94] 72  Resp:  [16-20] 20  SpO2:  [96 %-98 %] 97 %  BP: (140-176)/() 166/93     Weight: 107 kg (236 lb)  Body mass index is 34.85 kg/m².     Physical Exam  Vitals reviewed.   Constitutional:       Appearance: Normal appearance. He is obese.   Cardiovascular:      Rate and Rhythm: Normal rate and regular rhythm.      Pulses: Normal pulses.      Heart sounds: Normal heart sounds.   Pulmonary:      Effort: Pulmonary effort is normal.      Breath sounds: Normal breath sounds.   Musculoskeletal:         General: Swelling (mild left lower extremity and moderate right lower extremity) and tenderness (bilateral lower extremities) present.   Skin:     General: Skin is warm and dry.      Capillary Refill: Capillary refill takes less than 2 seconds.      Comments: Multiple abrasions with surrounding redness noted to bilateral lower extremities with more prominent cellulitis to the right lower extremity proximal to the right ankle.   Neurological:      General: No focal deficit present.      Mental Status: He is alert and oriented to person, place, and time.   Psychiatric:         Mood and Affect: Mood normal.         Behavior: Behavior normal.         Thought Content: Thought content normal.         Judgment: Judgment normal.                Significant Labs: All pertinent labs within the past 24 hours have been reviewed.  Recent Lab Results  (Last 5 results in the past 24 hours)         05/04/24  1116   05/04/24  0601   05/04/24  0510   05/03/24  2247   05/03/24  2232        Anion Gap     10           Appearance, UA         Clear       Baso #     0.01           Basophil %     0.2           Bilirubin (UA)         Negative       BUN     10           BUN/CREAT RATIO     18           Calcium     7.5           Chloride     102           CO2     27           Color, UA         Light Yellow       Creatinine     0.57           Differential Method     Auto           eGFR     122           Eos #     0.07           Eos %     1.3           Glucose     305           Glucose, UA         500       Hematocrit     35.9           Hemoglobin     12.6           Ketones, UA         Negative       Leukocyte Esterase, UA         Negative       Lymph #     1.79           Lymph %     33.0           Magnesium      1.8           MCH     33.0           MCHC     35.1           MCV     94.0           Mono #     0.55           Mono %     10.1           MPV     10.1           Neutrophils, Abs     3.01           Neutrophils Relative     55.4           NITRITE UA         Negative       Blood, UA         Negative       pH, UA         6.0       Platelet Count     134           POC Glucose 305   328     437         Potassium     3.7           Protein, UA         Negative       RBC     3.82           RDW     12.1           Sodium     135           Specific [place], UA         1.010       UROBILINOGEN UA         0.2       WBC     5.43                                  Significant Imaging: I have reviewed all pertinent imaging results/findings within the past 24 hours.  Assessment/Plan:     * Cellulitis of multiple sites of lower extremity  5/4/24 Wound culture pending. Clindamycin 600mg IV Q8H until cultures result.       Other hyperlipidemia  5/4/24 Lipid panel pending. Will resume patient's previously prescribed statin and adjust as indicated once lipid panel results.       Type 2 diabetes mellitus with hyperglycemia, without long-term  current use of insulin  5/4/24 A1C sent and pending. Glucose monitoring AC/HS. Diabetic Diet. Moderate Sliding Scale.      Essential hypertension  Chronic, uncontrolled. Latest blood pressure and vitals reviewed-     Temp:  [98 °F (36.7 °C)-98.1 °F (36.7 °C)]   Pulse:  [75-94]   Resp:  [18-20]   BP: (140-176)/()   SpO2:  [96 %-98 %] .   Home meds for hypertension were reviewed and noted below.   Hypertension Medications               losartan-hydrochlorothiazide 50-12.5 mg (HYZAAR) 50-12.5 mg per tablet Take 1 tablet by mouth once daily.            While in the hospital, will manage blood pressure as follows; Will resume patient's previously prescribed home regimen and adjust accordingly and monitor blood pressure Q4h with routine vital signs.    Will utilize p.r.n. blood pressure medication only if patient's blood pressure greater than 180/110 and he develops symptoms such as worsening chest pain or shortness of breath.      VTE Risk Mitigation (From admission, onward)           Ordered     enoxaparin injection 40 mg  Daily         05/04/24 0045     IP VTE HIGH RISK PATIENT  Once         05/04/24 0045     Place sequential compression device  Until discontinued         05/04/24 0045                         On 05/04/2024, patient should be placed in hospital observation services under my care in collaboration with Dr. Urrutia.           Maria Teresa Cagle, GERRY  Department of Hospital Medicine  [facility] - Medical Surgical Unit

## 2024-05-04 NOTE — ASSESSMENT & PLAN NOTE
Chronic, uncontrolled. Latest blood pressure and vitals reviewed-     Temp:  [98 °F (36.7 °C)-98.1 °F (36.7 °C)]   Pulse:  [75-94]   Resp:  [18-20]   BP: (140-176)/()   SpO2:  [96 %-98 %] .   Home meds for hypertension were reviewed and noted below.   Hypertension Medications               losartan-hydrochlorothiazide 50-12.5 mg (HYZAAR) 50-12.5 mg per tablet Take 1 tablet by mouth once daily.            While in the hospital, will manage blood pressure as follows; Will resume patient's previously prescribed home regimen and adjust accordingly and monitor blood pressure Q4h with routine vital signs.    Will utilize p.r.n. blood pressure medication only if patient's blood pressure greater than 180/110 and he develops symptoms such as worsening chest pain or shortness of breath.

## 2024-05-04 NOTE — HPI
Patient is a 48-year-old male who presents to emergency department for evaluation of wounds to his lower bilateral extremities. He reports that approximately 2 weeks prior his dogs scratched his legs. He reports that he thought it would get better on its own. He was recently been treating the wounds with peroxide and is now reporting that they are becoming more red and swollen especially in the right lower extremity. He does have a history of hypertension, hyperlipidemia and poorly-controlled type 2 diabetes. He does not regularly check his blood sugar and just recently began taking his metformin. He states that he has not had a hemoglobin A1c were seen a provider in approximately 2 years. He denies any fever, chills, weakness, chest pain, shortness of breath, or any other complaints at this time. He is unsure of his tetanus status. His blood pressure is 176/103, temperature 98°, heart rate 94, respirations 20, and oxygen saturation 96% on room air. He appears in no acute distress.  He is alert and oriented x4. GCS 15. Hypertensive with a blood pressure of 176/103 at the time of triage. Afebrile with a temperature 98°. Vital signs stable otherwise. He is a type 2 diabetic and appears poorly controlled. Point of care glucose today 455. He is unsure of his tetanus status so we will provide him with a Boostrix 0.5 mL IM here in the emergency department. We will initiate IV access, send urine sample, obtain lab specimens for further evaluation of wound infection and hyperglycemia, send wound and blood cultures, perform an x-ray over the largest area of infection near the right ankle to rule out osteomyelitis, provide hydration with 1 L normal saline IV bolus, and initiate antibiotic therapy with clindamycin 600 mg IV. Magnesium was noted to be 1.6 we did replace with 2 g of IV magnesium here in the ED. Preliminary x-ray readings showed no acute fracture or subluxation and no mention of osteomyelitis. Final radiology  read pending at the time of disposition. Treatment options were discussed with the patient and his family. He does not currently have established primary care and no adequate timely follow up at this time available. He has also been noncompliant with his home medications and for these reasons it was felt that he would benefit from observation hospital admission for treatment of cellulitis and nonketotic hyperglycemia. Case was discussed with Dr. Urrutia at [facility] who was in agreement with the plan of admission under observation status. We will continue IV fluid hydration, IV clindamycin, place him on a moderate scale of NovoLog, Lovenox for DVT prophylaxis, and resume his daily medications for hypertension and hyperlipidemia. All questions were answered and the patient and his family are in agreement.

## 2024-05-04 NOTE — SUBJECTIVE & OBJECTIVE
Past Medical History:   Diagnosis Date    Diabetes mellitus, type 2     Hyperlipidemia     Hypertension        No past surgical history on file.    Review of patient's allergies indicates:  No Known Allergies    Current Facility-Administered Medications   Medication Dose Route Frequency Provider Last Rate Last Admin    0.9%  NaCl infusion   Intravenous Continuous Dayday Hernandez  mL/hr at 05/04/24 0939 New Bag at 05/04/24 0939    acetaminophen tablet 650 mg  650 mg Oral Q8H PRN Dayday Hernandez FNP        atorvastatin tablet 40 mg  40 mg Oral QHS Dayday Hernandez FNP        clindamycin in D5W 600 mg/50 mL IVPB 600 mg  600 mg Intravenous Q8H Dayday Hernandez FNP   Stopped at 05/04/24 0643    dextrose 50% injection 12.5 g  12.5 g Intravenous PRN Dayday Hernandez FNP        dextrose 50% injection 25 g  25 g Intravenous PRN Dayday Hernandez FNP        enoxaparin injection 40 mg  40 mg Subcutaneous Daily Dayday Hernandez FNP        glucagon (human recombinant) injection 1 mg  1 mg Intramuscular PRN Dayday Hernandez, GERRY        glucose chewable tablet 16 g  16 g Oral PRN Dayday Hernandez, GERRY        glucose chewable tablet 24 g  24 g Oral PRN Dayday Hernandez FNP        losartan tablet 50 mg  50 mg Oral Daily Nito Junior IV, DO   50 mg at 05/04/24 0837    And    hydroCHLOROthiazide tablet 12.5 mg  12.5 mg Oral Daily Nito Junior IV, DO   12.5 mg at 05/04/24 0837    HYDROcodone-acetaminophen 5-325 mg per tablet 1 tablet  1 tablet Oral Q4H PRN Dayday Hernandez FNP   1 tablet at 05/04/24 0618    insulin aspart U-100 injection 0-10 Units  0-10 Units Subcutaneous QID (AC + HS) PRN Dayday Hernandez FNP   8 Units at 05/04/24 1141    melatonin tablet 6 mg  6 mg Oral Nightly PRN Dayday Hernandez FNP        sodium chloride 0.9% flush 10 mL  10 mL Intravenous PRN Dayday Hernandez FNP         Family History       Problem Relation (Age of Onset)    Diabetes Father     Heart disease Mother    Hypertension Father          Tobacco Use    Smoking status: Every Day     Current packs/day: 0.00     Types: Vaping with nicotine, Cigarettes     Last attempt to quit: 2019     Years since quittin.3    Smokeless tobacco: Never   Substance and Sexual Activity    Alcohol use: Not Currently     Comment: 6 pack a day    Drug use: Never    Sexual activity: Not on file     Review of Systems   Constitutional: Negative.    Respiratory: Negative.     Cardiovascular: Negative.    Musculoskeletal:  Positive for arthralgias.        Lower extremity edema   Skin:  Positive for wound (multiple abrasions to lower extremities).   Neurological: Negative.    Psychiatric/Behavioral: Negative.     All other systems reviewed and are negative.    Objective:     Vital Signs (Most Recent):  Temp: 97.6 °F (36.4 °C) (24 112)  Pulse: 72 (24)  Resp: 20 (24)  BP: (!) 166/93 (24)  SpO2: 97 % (24) Vital Signs (24h Range):  Temp:  [97.4 °F (36.3 °C)-98.1 °F (36.7 °C)] 97.6 °F (36.4 °C)  Pulse:  [62-94] 72  Resp:  [16-20] 20  SpO2:  [96 %-98 %] 97 %  BP: (140-176)/() 166/93     Weight: 107 kg (236 lb)  Body mass index is 34.85 kg/m².     Physical Exam  Vitals reviewed.   Constitutional:       Appearance: Normal appearance. He is obese.   Cardiovascular:      Rate and Rhythm: Normal rate and regular rhythm.      Pulses: Normal pulses.      Heart sounds: Normal heart sounds.   Pulmonary:      Effort: Pulmonary effort is normal.      Breath sounds: Normal breath sounds.   Musculoskeletal:         General: Swelling (mild left lower extremity and moderate right lower extremity) and tenderness (bilateral lower extremities) present.   Skin:     General: Skin is warm and dry.      Capillary Refill: Capillary refill takes less than 2 seconds.      Comments: Multiple abrasions with surrounding redness noted to bilateral lower extremities with more prominent cellulitis to  the right lower extremity proximal to the right ankle.   Neurological:      General: No focal deficit present.      Mental Status: He is alert and oriented to person, place, and time.   Psychiatric:         Mood and Affect: Mood normal.         Behavior: Behavior normal.         Thought Content: Thought content normal.         Judgment: Judgment normal.                Significant Labs: All pertinent labs within the past 24 hours have been reviewed.  Recent Lab Results  (Last 5 results in the past 24 hours)        05/04/24  1116   05/04/24  0601   05/04/24  0510   05/03/24  2247   05/03/24  2232        Anion Gap     10           Appearance, UA         Clear       Baso #     0.01           Basophil %     0.2           Bilirubin (UA)         Negative       BUN     10           BUN/CREAT RATIO     18           Calcium     7.5           Chloride     102           CO2     27           Color, UA         Light Yellow       Creatinine     0.57           Differential Method     Auto           eGFR     122           Eos #     0.07           Eos %     1.3           Glucose     305           Glucose, UA         500       Hematocrit     35.9           Hemoglobin     12.6           Ketones, UA         Negative       Leukocyte Esterase, UA         Negative       Lymph #     1.79           Lymph %     33.0           Magnesium      1.8           MCH     33.0           MCHC     35.1           MCV     94.0           Mono #     0.55           Mono %     10.1           MPV     10.1           Neutrophils, Abs     3.01           Neutrophils Relative     55.4           NITRITE UA         Negative       Blood, UA         Negative       pH, UA         6.0       Platelet Count     134           POC Glucose 305   328     437         Potassium     3.7           Protein, UA         Negative       RBC     3.82           RDW     12.1           Sodium     135           Specific [place], UA         1.010       UROBILINOGEN UA         0.2       WBC      5.43                                  Significant Imaging: I have reviewed all pertinent imaging results/findings within the past 24 hours.

## 2024-05-04 NOTE — ASSESSMENT & PLAN NOTE
5/4/24 Lipid panel pending. Will resume patient's previously prescribed statin and adjust as indicated once lipid panel results.

## 2024-05-05 LAB
ANION GAP SERPL CALCULATED.3IONS-SCNC: 11 MMOL/L (ref 7–16)
BASOPHILS # BLD AUTO: 0.01 K/UL (ref 0–0.2)
BASOPHILS NFR BLD AUTO: 0.2 % (ref 0–1)
BUN SERPL-MCNC: 7 MG/DL (ref 7–18)
BUN/CREAT SERPL: 13 (ref 6–20)
CALCIUM SERPL-MCNC: 8.1 MG/DL (ref 8.5–10.1)
CHLORIDE SERPL-SCNC: 100 MMOL/L (ref 98–107)
CO2 SERPL-SCNC: 26 MMOL/L (ref 21–32)
CREAT SERPL-MCNC: 0.52 MG/DL (ref 0.7–1.3)
DIFFERENTIAL METHOD BLD: ABNORMAL
EGFR (NO RACE VARIABLE) (RUSH/TITUS): 126 ML/MIN/1.73M2
EOSINOPHIL # BLD AUTO: 0.07 K/UL (ref 0–0.5)
EOSINOPHIL NFR BLD AUTO: 1.4 % (ref 1–4)
ERYTHROCYTE [DISTWIDTH] IN BLOOD BY AUTOMATED COUNT: 12 % (ref 11.5–14.5)
GLUCOSE SERPL-MCNC: 218 MG/DL (ref 70–105)
GLUCOSE SERPL-MCNC: 272 MG/DL (ref 70–105)
GLUCOSE SERPL-MCNC: 295 MG/DL (ref 70–105)
GLUCOSE SERPL-MCNC: 302 MG/DL (ref 70–105)
GLUCOSE SERPL-MCNC: 317 MG/DL (ref 74–106)
HCT VFR BLD AUTO: 38.6 % (ref 40–54)
HGB BLD-MCNC: 13.5 G/DL (ref 13.5–18)
LYMPHOCYTES # BLD AUTO: 1.66 K/UL (ref 1–4.8)
LYMPHOCYTES NFR BLD AUTO: 32.2 % (ref 27–41)
MAGNESIUM SERPL-MCNC: 1.5 MG/DL (ref 1.7–2.3)
MCH RBC QN AUTO: 32.5 PG (ref 27–31)
MCHC RBC AUTO-ENTMCNC: 35 G/DL (ref 32–36)
MCV RBC AUTO: 93 FL (ref 80–96)
MONOCYTES # BLD AUTO: 0.43 K/UL (ref 0–0.8)
MONOCYTES NFR BLD AUTO: 8.3 % (ref 2–6)
MPC BLD CALC-MCNC: 10.6 FL (ref 9.4–12.4)
NEUTROPHILS # BLD AUTO: 2.98 K/UL (ref 1.8–7.7)
NEUTROPHILS NFR BLD AUTO: 57.9 % (ref 53–65)
PLATELET # BLD AUTO: 146 K/UL (ref 150–400)
POTASSIUM SERPL-SCNC: 4.1 MMOL/L (ref 3.5–5.1)
RBC # BLD AUTO: 4.15 M/UL (ref 4.6–6.2)
SODIUM SERPL-SCNC: 133 MMOL/L (ref 136–145)
WBC # BLD AUTO: 5.15 K/UL (ref 4.5–11)

## 2024-05-05 PROCEDURE — 80048 BASIC METABOLIC PNL TOTAL CA: CPT

## 2024-05-05 PROCEDURE — 27000982 HC MATTRESS, MATRIX LAL RENTAL

## 2024-05-05 PROCEDURE — 96366 THER/PROPH/DIAG IV INF ADDON: CPT

## 2024-05-05 PROCEDURE — 27000944

## 2024-05-05 PROCEDURE — 96361 HYDRATE IV INFUSION ADD-ON: CPT

## 2024-05-05 PROCEDURE — 96372 THER/PROPH/DIAG INJ SC/IM: CPT

## 2024-05-05 PROCEDURE — 63600175 PHARM REV CODE 636 W HCPCS

## 2024-05-05 PROCEDURE — 85025 COMPLETE CBC W/AUTO DIFF WBC: CPT

## 2024-05-05 PROCEDURE — 83735 ASSAY OF MAGNESIUM: CPT

## 2024-05-05 PROCEDURE — 36415 COLL VENOUS BLD VENIPUNCTURE: CPT

## 2024-05-05 PROCEDURE — G0378 HOSPITAL OBSERVATION PER HR: HCPCS

## 2024-05-05 PROCEDURE — 25000003 PHARM REV CODE 250: Performed by: FAMILY MEDICINE

## 2024-05-05 PROCEDURE — 94761 N-INVAS EAR/PLS OXIMETRY MLT: CPT

## 2024-05-05 PROCEDURE — 25000003 PHARM REV CODE 250

## 2024-05-05 PROCEDURE — 82962 GLUCOSE BLOOD TEST: CPT

## 2024-05-05 RX ORDER — LANOLIN ALCOHOL/MO/W.PET/CERES
400 CREAM (GRAM) TOPICAL DAILY
Status: DISCONTINUED | OUTPATIENT
Start: 2024-05-05 | End: 2024-05-06

## 2024-05-05 RX ADMIN — CLINDAMYCIN PHOSPHATE 600 MG: 600 INJECTION, SOLUTION INTRAVENOUS at 02:05

## 2024-05-05 RX ADMIN — LOSARTAN POTASSIUM 50 MG: 50 TABLET, FILM COATED ORAL at 08:05

## 2024-05-05 RX ADMIN — INSULIN ASPART 8 UNITS: 100 INJECTION, SOLUTION INTRAVENOUS; SUBCUTANEOUS at 12:05

## 2024-05-05 RX ADMIN — SODIUM CHLORIDE: 9 INJECTION, SOLUTION INTRAVENOUS at 02:05

## 2024-05-05 RX ADMIN — INSULIN ASPART 4 UNITS: 100 INJECTION, SOLUTION INTRAVENOUS; SUBCUTANEOUS at 04:05

## 2024-05-05 RX ADMIN — Medication 400 MG: at 08:05

## 2024-05-05 RX ADMIN — ENOXAPARIN SODIUM 40 MG: 40 INJECTION SUBCUTANEOUS at 04:05

## 2024-05-05 RX ADMIN — ATORVASTATIN CALCIUM 40 MG: 40 TABLET, FILM COATED ORAL at 08:05

## 2024-05-05 RX ADMIN — SODIUM CHLORIDE: 9 INJECTION, SOLUTION INTRAVENOUS at 05:05

## 2024-05-05 RX ADMIN — SODIUM CHLORIDE: 9 INJECTION, SOLUTION INTRAVENOUS at 11:05

## 2024-05-05 RX ADMIN — HYDROCHLOROTHIAZIDE 12.5 MG: 12.5 TABLET ORAL at 08:05

## 2024-05-05 RX ADMIN — CLINDAMYCIN PHOSPHATE 600 MG: 600 INJECTION, SOLUTION INTRAVENOUS at 11:05

## 2024-05-05 RX ADMIN — INSULIN ASPART 3 UNITS: 100 INJECTION, SOLUTION INTRAVENOUS; SUBCUTANEOUS at 09:05

## 2024-05-05 RX ADMIN — CLINDAMYCIN PHOSPHATE 600 MG: 600 INJECTION, SOLUTION INTRAVENOUS at 06:05

## 2024-05-05 RX ADMIN — INSULIN ASPART 6 UNITS: 100 INJECTION, SOLUTION INTRAVENOUS; SUBCUTANEOUS at 05:05

## 2024-05-05 NOTE — ASSESSMENT & PLAN NOTE
5/4/24 Wound culture pending. Clindamycin 600mg IV Q8H until cultures result.     5/5/24 wound culture still pending.  Continuing to receive clindamycin IV.  Redness appears improved on today's exam in the right lower extremity.  Continue current care until cultures are resulted.

## 2024-05-05 NOTE — HOSPITAL COURSE
5/5/24 patient resting comfortably this morning.  He reports that the discomfort in his right lower extremity has not improved this morning.  We did review photo documentation from the day before and compared to today's evaluation and it does appear that the redness is mildly improved today.  He was continuing to receive clindamycin IV and wound culture and blood cultures are expected later tonight for in the morning.  Case was discussed with Dr. Huerta who was in agreement with this plan and recommends no changes at this time.    05/06/24 /113- will add norvasc 5 mg po and monitor response. Denies pain. Continue clindamycin.      05/07/24 BP improved with norvasc. Cellulitis to right lower leg improved. Will dc home today to continue cephalexin 500 mg po every 6 hours for 7 days. Talked with him re home meds. Was out of all meds. Sent prescriptions for metformin, atorvastatin, losartan hctz ,cephalexin and lactobacillus. States he would like to follow up with Joan GARRETT.

## 2024-05-05 NOTE — PLAN OF CARE
Problem: Adult Inpatient Plan of Care  Goal: Plan of Care Review  Outcome: Progressing  Flowsheets (Taken 5/5/2024 0452)  Plan of Care Reviewed With: patient  Goal: Patient-Specific Goal (Individualized)  Outcome: Progressing  Flowsheets (Taken 5/5/2024 0452)  Individualized Care Needs: Diabetic education and managment  Anxieties, Fears or Concerns: None  Patient/Family-Specific Goals (Include Timeframe): To become more compliant in blood pressure and blood glucose control by discharge

## 2024-05-05 NOTE — SUBJECTIVE & OBJECTIVE
Interval History:  Patient reports decreased discomfort in the right leg and redness does appear to be improving.  We did roman the area with a surgical marker in order to track progression in the right lower extremity.    Review of Systems   Constitutional:  Negative for activity change, appetite change, chills and fatigue.   HENT:  Negative for congestion and trouble swallowing.    Eyes: Negative.    Respiratory:  Negative for cough and shortness of breath.    Cardiovascular:  Positive for leg swelling (right lower extremity). Negative for chest pain and palpitations.   Gastrointestinal:  Negative for abdominal distention, abdominal pain, diarrhea and vomiting.   Endocrine: Negative.    Genitourinary:  Negative for difficulty urinating and flank pain.   Musculoskeletal:  Positive for arthralgias. Negative for back pain, gait problem, neck pain and neck stiffness.   Skin:  Positive for wound (see photo documentation for further details.  No drainage noted at the time of evaluation.  Redness does appear to be improving right lower extremity.). Negative for color change and pallor.   Allergic/Immunologic: Negative.    Neurological:  Negative for dizziness and headaches.   Hematological: Negative.    Psychiatric/Behavioral:  Negative for confusion. The patient is not nervous/anxious.    All other systems reviewed and are negative.    Objective:     Vital Signs (Most Recent):  Temp: 97.9 °F (36.6 °C) (05/05/24 1123)  Pulse: 65 (05/05/24 1123)  Resp: 20 (05/05/24 1123)  BP: (!) 198/128 (05/05/24 1123)  SpO2: 99 % (05/05/24 1123) Vital Signs (24h Range):  Temp:  [97.4 °F (36.3 °C)-98.2 °F (36.8 °C)] 97.9 °F (36.6 °C)  Pulse:  [57-68] 65  Resp:  [16-20] 20  SpO2:  [96 %-99 %] 99 %  BP: (146-198)/() 198/128     Weight: 107 kg (236 lb)  Body mass index is 34.85 kg/m².    Intake/Output Summary (Last 24 hours) at 5/5/2024 1440  Last data filed at 5/5/2024 1253  Gross per 24 hour   Intake 3758.76 ml   Output --   Net  3758.76 ml         Physical Exam        Significant Labs: All pertinent labs within the past 24 hours have been reviewed.    Significant Imaging: I have reviewed all pertinent imaging results/findings within the past 24 hours.

## 2024-05-05 NOTE — PROGRESS NOTES
[facility] - Medical Surgical Unit  Hospital Medicine  Progress Note    Patient Name: Wallace Pedro  MRN: [id number]  Patient Class: OP- Observation   Admission Date: 5/3/2024  Length of Stay: 0 days  Attending Physician: Nito Junior IV, DO  Primary Care Provider: Jamilah, Primary Doctor        Subjective:     Principal Problem:Cellulitis of multiple sites of lower extremity        HPI:  Patient is a 48-year-old male who presents to emergency department for evaluation of wounds to his lower bilateral extremities. He reports that approximately 2 weeks prior his dogs scratched his legs. He reports that he thought it would get better on its own. He was recently been treating the wounds with peroxide and is now reporting that they are becoming more red and swollen especially in the right lower extremity. He does have a history of hypertension, hyperlipidemia and poorly-controlled type 2 diabetes. He does not regularly check his blood sugar and just recently began taking his metformin. He states that he has not had a hemoglobin A1c were seen a provider in approximately 2 years. He denies any fever, chills, weakness, chest pain, shortness of breath, or any other complaints at this time. He is unsure of his tetanus status. His blood pressure is 176/103, temperature 98°, heart rate 94, respirations 20, and oxygen saturation 96% on room air. He appears in no acute distress.  He is alert and oriented x4. GCS 15. Hypertensive with a blood pressure of 176/103 at the time of triage. Afebrile with a temperature 98°. Vital signs stable otherwise. He is a type 2 diabetic and appears poorly controlled. Point of care glucose today 455. He is unsure of his tetanus status so we will provide him with a Boostrix 0.5 mL IM here in the emergency department. We will initiate IV access, send urine sample, obtain lab specimens for further evaluation of wound infection and hyperglycemia, send wound and blood cultures, perform an  x-ray over the largest area of infection near the right ankle to rule out osteomyelitis, provide hydration with 1 L normal saline IV bolus, and initiate antibiotic therapy with clindamycin 600 mg IV. Magnesium was noted to be 1.6 we did replace with 2 g of IV magnesium here in the ED. Preliminary x-ray readings showed no acute fracture or subluxation and no mention of osteomyelitis. Final radiology read pending at the time of disposition. Treatment options were discussed with the patient and his family. He does not currently have established primary care and no adequate timely follow up at this time available. He has also been noncompliant with his home medications and for these reasons it was felt that he would benefit from observation hospital admission for treatment of cellulitis and nonketotic hyperglycemia. Case was discussed with Dr. Urrutia at [facility] who was in agreement with the plan of admission under observation status. We will continue IV fluid hydration, IV clindamycin, place him on a moderate scale of NovoLog, Lovenox for DVT prophylaxis, and resume his daily medications for hypertension and hyperlipidemia. All questions were answered and the patient and his family are in agreement.     Overview/Hospital Course:  5/5/24 patient resting comfortably this morning.  He reports that the discomfort in his right lower extremity has not improved this morning.  We did review photo documentation from the day before and compared to today's evaluation and it does appear that the redness is mildly improved today.  He was continuing to receive clindamycin IV and wound culture and blood cultures are expected later tonight for in the morning.  Case was discussed with Dr. Huerta who was in agreement with this plan and recommends no changes at this time.    Interval History:  Patient reports decreased discomfort in the right leg and redness does appear to be improving.  We did roman the area with a surgical marker  in order to track progression in the right lower extremity.    Review of Systems   Constitutional:  Negative for activity change, appetite change, chills and fatigue.   HENT:  Negative for congestion and trouble swallowing.    Eyes: Negative.    Respiratory:  Negative for cough and shortness of breath.    Cardiovascular:  Positive for leg swelling (right lower extremity). Negative for chest pain and palpitations.   Gastrointestinal:  Negative for abdominal distention, abdominal pain, diarrhea and vomiting.   Endocrine: Negative.    Genitourinary:  Negative for difficulty urinating and flank pain.   Musculoskeletal:  Positive for arthralgias. Negative for back pain, gait problem, neck pain and neck stiffness.   Skin:  Positive for wound (see photo documentation for further details.  No drainage noted at the time of evaluation.  Redness does appear to be improving right lower extremity.). Negative for color change and pallor.   Allergic/Immunologic: Negative.    Neurological:  Negative for dizziness and headaches.   Hematological: Negative.    Psychiatric/Behavioral:  Negative for confusion. The patient is not nervous/anxious.    All other systems reviewed and are negative.    Objective:     Vital Signs (Most Recent):  Temp: 97.9 °F (36.6 °C) (05/05/24 1123)  Pulse: 65 (05/05/24 1123)  Resp: 20 (05/05/24 1123)  BP: (!) 198/128 (05/05/24 1123)  SpO2: 99 % (05/05/24 1123) Vital Signs (24h Range):  Temp:  [97.4 °F (36.3 °C)-98.2 °F (36.8 °C)] 97.9 °F (36.6 °C)  Pulse:  [57-68] 65  Resp:  [16-20] 20  SpO2:  [96 %-99 %] 99 %  BP: (146-198)/() 198/128     Weight: 107 kg (236 lb)  Body mass index is 34.85 kg/m².    Intake/Output Summary (Last 24 hours) at 5/5/2024 1440  Last data filed at 5/5/2024 1253  Gross per 24 hour   Intake 3758.76 ml   Output --   Net 3758.76 ml         Physical Exam        Significant Labs: All pertinent labs within the past 24 hours have been reviewed.    Significant Imaging: I have reviewed  all pertinent imaging results/findings within the past 24 hours.    Assessment/Plan:      * Cellulitis of multiple sites of lower extremity  5/4/24 Wound culture pending. Clindamycin 600mg IV Q8H until cultures result.     5/5/24 wound culture still pending.  Continuing to receive clindamycin IV.  Redness appears improved on today's exam in the right lower extremity.  Continue current care until cultures are resulted.      Type 2 diabetes mellitus with hyperglycemia, without long-term current use of insulin  5/4/24 A1C sent and pending. Glucose monitoring AC/HS. Diabetic Diet. Moderate Sliding Scale.      Essential hypertension  Chronic, uncontrolled. Latest blood pressure and vitals reviewed-     Temp:  [98 °F (36.7 °C)-98.1 °F (36.7 °C)]   Pulse:  [75-94]   Resp:  [18-20]   BP: (140-176)/()   SpO2:  [96 %-98 %] .   Home meds for hypertension were reviewed and noted below.   Hypertension Medications               losartan-hydrochlorothiazide 50-12.5 mg (HYZAAR) 50-12.5 mg per tablet Take 1 tablet by mouth once daily.            While in the hospital, will manage blood pressure as follows; Will resume patient's previously prescribed home regimen and adjust accordingly and monitor blood pressure Q4h with routine vital signs.    Will utilize p.r.n. blood pressure medication only if patient's blood pressure greater than 180/110 and he develops symptoms such as worsening chest pain or shortness of breath.    Other hyperlipidemia  5/4/24 Lipid panel pending. Will resume patient's previously prescribed statin and adjust as indicated once lipid panel results.         VTE Risk Mitigation (From admission, onward)           Ordered     enoxaparin injection 40 mg  Daily         05/04/24 0045     IP VTE HIGH RISK PATIENT  Once         05/04/24 0045     Place sequential compression device  Until discontinued         05/04/24 0045                    Discharge Planning   NIDHI:      Code Status: Full Code   Is the patient  medically ready for discharge?:     Reason for patient still in hospital (select all that apply): Patient trending condition                     GERRY YOUNG  Department of Hospital Medicine   [facility] - Medical Surgical Unit

## 2024-05-06 PROBLEM — E83.42 HYPOMAGNESEMIA: Status: ACTIVE | Noted: 2024-05-06

## 2024-05-06 LAB
ANION GAP SERPL CALCULATED.3IONS-SCNC: 12 MMOL/L (ref 7–16)
BASOPHILS # BLD AUTO: 0.01 K/UL (ref 0–0.2)
BASOPHILS NFR BLD AUTO: 0.2 % (ref 0–1)
BUN SERPL-MCNC: 7 MG/DL (ref 7–18)
BUN/CREAT SERPL: 12 (ref 6–20)
CALCIUM SERPL-MCNC: 8.3 MG/DL (ref 8.5–10.1)
CHLORIDE SERPL-SCNC: 102 MMOL/L (ref 98–107)
CO2 SERPL-SCNC: 26 MMOL/L (ref 21–32)
CREAT SERPL-MCNC: 0.58 MG/DL (ref 0.7–1.3)
DIFFERENTIAL METHOD BLD: ABNORMAL
EGFR (NO RACE VARIABLE) (RUSH/TITUS): 122 ML/MIN/1.73M2
EOSINOPHIL # BLD AUTO: 0.07 K/UL (ref 0–0.5)
EOSINOPHIL NFR BLD AUTO: 1.2 % (ref 1–4)
ERYTHROCYTE [DISTWIDTH] IN BLOOD BY AUTOMATED COUNT: 12 % (ref 11.5–14.5)
GLUCOSE SERPL-MCNC: 221 MG/DL (ref 70–105)
GLUCOSE SERPL-MCNC: 229 MG/DL (ref 70–105)
GLUCOSE SERPL-MCNC: 235 MG/DL (ref 70–105)
GLUCOSE SERPL-MCNC: 251 MG/DL (ref 70–105)
GLUCOSE SERPL-MCNC: 251 MG/DL (ref 74–106)
GLUCOSE SERPL-MCNC: 382 MG/DL (ref 70–105)
HCT VFR BLD AUTO: 40.5 % (ref 40–54)
HGB BLD-MCNC: 14.3 G/DL (ref 13.5–18)
LYMPHOCYTES # BLD AUTO: 1.71 K/UL (ref 1–4.8)
LYMPHOCYTES NFR BLD AUTO: 28.9 % (ref 27–41)
MAGNESIUM SERPL-MCNC: 1.6 MG/DL (ref 1.7–2.3)
MCH RBC QN AUTO: 32.8 PG (ref 27–31)
MCHC RBC AUTO-ENTMCNC: 35.3 G/DL (ref 32–36)
MCV RBC AUTO: 92.9 FL (ref 80–96)
MICROORGANISM SPEC CULT: ABNORMAL
MONOCYTES # BLD AUTO: 0.5 K/UL (ref 0–0.8)
MONOCYTES NFR BLD AUTO: 8.4 % (ref 2–6)
MPC BLD CALC-MCNC: 10.8 FL (ref 9.4–12.4)
NEUTROPHILS # BLD AUTO: 3.63 K/UL (ref 1.8–7.7)
NEUTROPHILS NFR BLD AUTO: 61.3 % (ref 53–65)
PLATELET # BLD AUTO: 177 K/UL (ref 150–400)
POTASSIUM SERPL-SCNC: 3.9 MMOL/L (ref 3.5–5.1)
RBC # BLD AUTO: 4.36 M/UL (ref 4.6–6.2)
SODIUM SERPL-SCNC: 136 MMOL/L (ref 136–145)
WBC # BLD AUTO: 5.92 K/UL (ref 4.5–11)

## 2024-05-06 PROCEDURE — 27000982 HC MATTRESS, MATRIX LAL RENTAL

## 2024-05-06 PROCEDURE — 25000003 PHARM REV CODE 250: Performed by: FAMILY MEDICINE

## 2024-05-06 PROCEDURE — 36415 COLL VENOUS BLD VENIPUNCTURE: CPT

## 2024-05-06 PROCEDURE — 25000003 PHARM REV CODE 250

## 2024-05-06 PROCEDURE — 94761 N-INVAS EAR/PLS OXIMETRY MLT: CPT

## 2024-05-06 PROCEDURE — 27000944

## 2024-05-06 PROCEDURE — 11000001 HC ACUTE MED/SURG PRIVATE ROOM

## 2024-05-06 PROCEDURE — 63600175 PHARM REV CODE 636 W HCPCS: Performed by: NURSE PRACTITIONER

## 2024-05-06 PROCEDURE — 99232 SBSQ HOSP IP/OBS MODERATE 35: CPT | Mod: ,,, | Performed by: NURSE PRACTITIONER

## 2024-05-06 PROCEDURE — 96361 HYDRATE IV INFUSION ADD-ON: CPT

## 2024-05-06 PROCEDURE — G0378 HOSPITAL OBSERVATION PER HR: HCPCS

## 2024-05-06 PROCEDURE — 80048 BASIC METABOLIC PNL TOTAL CA: CPT

## 2024-05-06 PROCEDURE — 85025 COMPLETE CBC W/AUTO DIFF WBC: CPT

## 2024-05-06 PROCEDURE — 82962 GLUCOSE BLOOD TEST: CPT

## 2024-05-06 PROCEDURE — 96372 THER/PROPH/DIAG INJ SC/IM: CPT

## 2024-05-06 PROCEDURE — 25000003 PHARM REV CODE 250: Performed by: NURSE PRACTITIONER

## 2024-05-06 PROCEDURE — 83735 ASSAY OF MAGNESIUM: CPT

## 2024-05-06 PROCEDURE — 63600175 PHARM REV CODE 636 W HCPCS: Mod: JZ,TB

## 2024-05-06 PROCEDURE — 96366 THER/PROPH/DIAG IV INF ADDON: CPT

## 2024-05-06 RX ORDER — AMLODIPINE BESYLATE 5 MG/1
5 TABLET ORAL DAILY
Status: DISCONTINUED | OUTPATIENT
Start: 2024-05-06 | End: 2024-05-07 | Stop reason: HOSPADM

## 2024-05-06 RX ORDER — METFORMIN HYDROCHLORIDE 500 MG/1
500 TABLET ORAL 2 TIMES DAILY WITH MEALS
Status: DISCONTINUED | OUTPATIENT
Start: 2024-05-06 | End: 2024-05-07 | Stop reason: HOSPADM

## 2024-05-06 RX ORDER — SELENIUM 50 MCG
1 TABLET ORAL
Status: DISCONTINUED | OUTPATIENT
Start: 2024-05-06 | End: 2024-05-07 | Stop reason: HOSPADM

## 2024-05-06 RX ORDER — CLONIDINE HYDROCHLORIDE 0.1 MG/1
0.1 TABLET ORAL EVERY 6 HOURS PRN
Status: DISCONTINUED | OUTPATIENT
Start: 2024-05-06 | End: 2024-05-07 | Stop reason: HOSPADM

## 2024-05-06 RX ORDER — MAGNESIUM SULFATE HEPTAHYDRATE 40 MG/ML
2 INJECTION, SOLUTION INTRAVENOUS ONCE
Status: COMPLETED | OUTPATIENT
Start: 2024-05-06 | End: 2024-05-06

## 2024-05-06 RX ORDER — HYDRALAZINE HYDROCHLORIDE 20 MG/ML
10 INJECTION INTRAMUSCULAR; INTRAVENOUS EVERY 6 HOURS PRN
Status: DISCONTINUED | OUTPATIENT
Start: 2024-05-06 | End: 2024-05-06

## 2024-05-06 RX ADMIN — CLINDAMYCIN PHOSPHATE 600 MG: 600 INJECTION, SOLUTION INTRAVENOUS at 02:05

## 2024-05-06 RX ADMIN — MAGNESIUM SULFATE HEPTAHYDRATE 2 G: 40 INJECTION, SOLUTION INTRAVENOUS at 09:05

## 2024-05-06 RX ADMIN — INSULIN ASPART 4 UNITS: 100 INJECTION, SOLUTION INTRAVENOUS; SUBCUTANEOUS at 04:05

## 2024-05-06 RX ADMIN — CLINDAMYCIN PHOSPHATE 600 MG: 600 INJECTION, SOLUTION INTRAVENOUS at 11:05

## 2024-05-06 RX ADMIN — AMLODIPINE BESYLATE 5 MG: 5 TABLET ORAL at 11:05

## 2024-05-06 RX ADMIN — ENOXAPARIN SODIUM 40 MG: 40 INJECTION SUBCUTANEOUS at 04:05

## 2024-05-06 RX ADMIN — METFORMIN HYDROCHLORIDE 500 MG: 500 TABLET ORAL at 11:05

## 2024-05-06 RX ADMIN — INSULIN ASPART 2 UNITS: 100 INJECTION, SOLUTION INTRAVENOUS; SUBCUTANEOUS at 08:05

## 2024-05-06 RX ADMIN — CLINDAMYCIN PHOSPHATE 600 MG: 600 INJECTION, SOLUTION INTRAVENOUS at 06:05

## 2024-05-06 RX ADMIN — CLONIDINE HYDROCHLORIDE 0.1 MG: 0.1 TABLET ORAL at 03:05

## 2024-05-06 RX ADMIN — METFORMIN HYDROCHLORIDE 500 MG: 500 TABLET ORAL at 04:05

## 2024-05-06 RX ADMIN — ATORVASTATIN CALCIUM 40 MG: 40 TABLET, FILM COATED ORAL at 08:05

## 2024-05-06 RX ADMIN — INSULIN ASPART 6 UNITS: 100 INJECTION, SOLUTION INTRAVENOUS; SUBCUTANEOUS at 06:05

## 2024-05-06 RX ADMIN — LOSARTAN POTASSIUM 50 MG: 50 TABLET, FILM COATED ORAL at 08:05

## 2024-05-06 RX ADMIN — INSULIN ASPART 4 UNITS: 100 INJECTION, SOLUTION INTRAVENOUS; SUBCUTANEOUS at 11:05

## 2024-05-06 RX ADMIN — Medication 1 CAPSULE: at 04:05

## 2024-05-06 RX ADMIN — HYDROCHLOROTHIAZIDE 12.5 MG: 12.5 TABLET ORAL at 08:05

## 2024-05-06 RX ADMIN — Medication 1 CAPSULE: at 11:05

## 2024-05-06 NOTE — ASSESSMENT & PLAN NOTE
Patient has Abnormal Magnesium: hypomagnesemia. Will continue to monitor electrolytes closely. Will replace the affected electrolytes and repeat labs to be done after interventions completed. The patient's magnesium results have been reviewed and are listed below.  Recent Labs   Lab 05/06/24  0620   MG 1.6*        05/06/24 magnesium sulfate 2 gms IV

## 2024-05-06 NOTE — ASSESSMENT & PLAN NOTE
5/4/24 Lipid panel pending. Will resume patient's previously prescribed statin and adjust as indicated once lipid panel results.   05/06/24   171    Cholesterol Total                 HDL           30    HDL     CHOL/HDLC Ratio           5.7    CHOL/HDLC Ratio     LDL Calculated           83    LDL Calculated     LDL/HDL Ratio           2.8    LDL/HDL Ratio     Non-HDL Cholesterol           141    Non-HDL Cholesterol     Triglycerides           292    Triglycerides     VLDL Cholesterol Ernesto           58    VLDL Cholesterol Ernesto     OTHER CHEM   Atorvastatin 40 mg po at hs

## 2024-05-06 NOTE — SUBJECTIVE & OBJECTIVE
Past Medical History:   Diagnosis Date    Diabetes mellitus, type 2     Hyperlipidemia     Hypertension        No past surgical history on file.    Review of patient's allergies indicates:  No Known Allergies    Current Facility-Administered Medications   Medication Dose Route Frequency Provider Last Rate Last Admin    acetaminophen tablet 650 mg  650 mg Oral Q8H PRN Dayday Hernandez FNP        atorvastatin tablet 40 mg  40 mg Oral QHS Dayday Hernandez, FNP   40 mg at 05/05/24 2053    clindamycin in D5W 600 mg/50 mL IVPB 600 mg  600 mg Intravenous Q8H Dayday Hernandez FNP   Stopped at 05/06/24 0639    dextrose 50% injection 12.5 g  12.5 g Intravenous PRN Dayday Hernandez FNP        dextrose 50% injection 25 g  25 g Intravenous PRN Dayday Hernandez FNP        enoxaparin injection 40 mg  40 mg Subcutaneous Daily Dayday Hernandez FNP   40 mg at 05/05/24 1657    glucagon (human recombinant) injection 1 mg  1 mg Intramuscular PRN Dayday Hernandez FNP        glucose chewable tablet 16 g  16 g Oral PRN Dayday Hernandez FNP        glucose chewable tablet 24 g  24 g Oral PRN Dayday Hernandez FNP        losartan tablet 50 mg  50 mg Oral Daily Nito Junior IV, DO   50 mg at 05/06/24 0836    And    hydroCHLOROthiazide tablet 12.5 mg  12.5 mg Oral Daily Nito Junior IV, DO   12.5 mg at 05/06/24 0836    HYDROcodone-acetaminophen 5-325 mg per tablet 1 tablet  1 tablet Oral Q4H PRN Dayday Hernandez FNP   1 tablet at 05/04/24 0618    insulin aspart U-100 injection 0-10 Units  0-10 Units Subcutaneous QID (AC + HS) PRN Dayday Hernandez FNP   6 Units at 05/06/24 0634    Lactobacillus acidophilus capsule 1 capsule  1 capsule Oral TID WM Ragini Corral FNP        magnesium sulfate 2g in water 50mL IVPB (premix)  2 g Intravenous Once CorralRagini [place] FNP 25 mL/hr at 05/06/24 0931 2 g at 05/06/24 0931    melatonin tablet 6 mg  6 mg Oral Nightly PRN Dayday Hernandez FNP         sodium chloride 0.9% flush 10 mL  10 mL Intravenous PRN Dayday Hernandez, FNP         Family History       Problem Relation (Age of Onset)    Diabetes Father    Heart disease Mother    Hypertension Father          Tobacco Use    Smoking status: Every Day     Current packs/day: 0.00     Types: Vaping with nicotine, Cigarettes     Last attempt to quit: 2019     Years since quittin.3    Smokeless tobacco: Never   Substance and Sexual Activity    Alcohol use: Not Currently     Comment: 6 pack a day    Drug use: Never    Sexual activity: Not on file     Review of Systems   Constitutional:  Negative for appetite change and fatigue.   Respiratory:  Negative for cough, chest tightness and shortness of breath.    Cardiovascular:  Negative for chest pain.   Gastrointestinal:  Negative for abdominal distention, abdominal pain, constipation, diarrhea, nausea and vomiting.   Genitourinary:  Negative for difficulty urinating.   Skin:  Positive for wound.        Has scratch marks to left leg   Wounds from scratch marks , states from his dachshund.   Cultured MSSA , sensitive to clindamycin  Samll amout clear drainage   No odor, afebrile , WBC normal    Psychiatric/Behavioral:  Negative for confusion.      Objective:     Vital Signs (Most Recent):  Temp: 98 °F (36.7 °C) (24)  Pulse: 68 (24)  Resp: 18 (24)  BP: (!) 172/113 (24)  SpO2: 97 % (24) Vital Signs (24h Range):  Temp:  [97.6 °F (36.4 °C)-98.5 °F (36.9 °C)] 98 °F (36.7 °C)  Pulse:  [61-76] 68  Resp:  [16-20] 18  SpO2:  [97 %-99 %] 97 %  BP: (150-198)/() 172/113     Weight: 107 kg (236 lb)  Body mass index is 34.85 kg/m².     Physical Exam  HENT:      Head: Normocephalic.      Mouth/Throat:      Mouth: Mucous membranes are moist.   Cardiovascular:      Rate and Rhythm: Normal rate and regular rhythm.      Pulses: Normal pulses.      Heart sounds: Normal heart sounds.   Pulmonary:      Effort: Pulmonary effort  is normal. No respiratory distress.      Breath sounds: Normal breath sounds. No stridor. No wheezing or rhonchi.   Abdominal:      General: Bowel sounds are normal. There is no distension.      Palpations: Abdomen is soft. There is no mass.      Tenderness: There is no abdominal tenderness.      Hernia: No hernia is present.   Skin:     Findings: Erythema present.      Comments: See pics in media      Redness and edema have lessened with treatment  Small amount clear drainage to wound right lower leg. No odor. Afebrile   WBC normal  Wound culture shows growth of MSSA , sensitive to clindamycin   Continue abx   Neurological:      Mental Status: He is alert and oriented to person, place, and time.      Cranial Nerves: No cranial nerve deficit.      Motor: No weakness.   Psychiatric:         Mood and Affect: Mood normal.                Significant Labs: All pertinent labs within the past 24 hours have been reviewed.  Recent Lab Results  (Last 5 results in the past 24 hours)        05/06/24  0620   05/06/24  0604   05/05/24  2054   05/05/24  1624   05/05/24  1122        Anion Gap 12               Baso # 0.01               Basophil % 0.2               BUN 7               BUN/CREAT RATIO 12               Calcium 8.3               Chloride 102               CO2 26               Creatinine 0.58               Differential Method Auto               eGFR 122               Eos # 0.07               Eos % 1.2               Glucose 251               Hematocrit 40.5               Hemoglobin 14.3               Lymph # 1.71               Lymph % 28.9               Magnesium  1.6               MCH 32.8               MCHC 35.3               MCV 92.9               Mono # 0.50               Mono % 8.4               MPV 10.8               Neutrophils, Abs 3.63               Neutrophils Relative 61.3               Platelet Count 177               POC Glucose   251   295   218   302       Potassium 3.9               RBC 4.36                RDW 12.0               Sodium 136               WBC 5.92                                      Significant Imaging: I have reviewed all pertinent imaging results/findings within the past 24 hours.

## 2024-05-06 NOTE — ASSESSMENT & PLAN NOTE
Chronic, uncontrolled. Latest blood pressure and vitals reviewed-     Temp:  [97.6 °F (36.4 °C)-98.5 °F (36.9 °C)]   Pulse:  [61-76]   Resp:  [16-20]   BP: (150-198)/()   SpO2:  [97 %-99 %] .   Home meds for hypertension were reviewed and noted below.   Hypertension Medications               losartan-hydrochlorothiazide 50-12.5 mg (HYZAAR) 50-12.5 mg per tablet Take 1 tablet by mouth once daily.            While in the hospital, will manage blood pressure as follows; Will resume patient's previously prescribed home regimen and adjust accordingly and monitor blood pressure Q4h with routine vital signs.    Will utilize p.r.n. blood pressure medication only if patient's blood pressure greater than 180/110 and he develops symptoms such as worsening chest pain or shortness of breath.      05/06/24 DC fluids   Give losartan and hctz

## 2024-05-06 NOTE — ASSESSMENT & PLAN NOTE
5/4/24 A1C sent and pending. Glucose monitoring AC/HS. Diabetic Diet. Moderate Sliding Scale.      05/06/24 continue moderate SSI coverage   A1C pending   Start metformin 500 mg po BID

## 2024-05-06 NOTE — H&P
[facility] - Medical Surgical Unit  Hospital Medicine  History & Physical    Patient Name: Wallace Pedro  MRN: [id number]  Patient Class: OP- Observation  Admission Date: 5/3/2024  Attending Physician: Jose Riley Jr., MD   Primary Care Provider: Jamilah Primary Doctor         Patient information was obtained from patient, past medical records, and ER records.     Subjective:     Principal Problem:Cellulitis of multiple sites of lower extremity    Chief Complaint:   Chief Complaint   Patient presents with    Leg Pain     Pt has bilateral lower leg swelling and redness x few weeks.    Cellulitis        HPI: Patient is a 48-year-old male who presents to emergency department for evaluation of wounds to his lower bilateral extremities. He reports that approximately 2 weeks prior his dogs scratched his legs. He reports that he thought it would get better on its own. He was recently been treating the wounds with peroxide and is now reporting that they are becoming more red and swollen especially in the right lower extremity. He does have a history of hypertension, hyperlipidemia and poorly-controlled type 2 diabetes. He does not regularly check his blood sugar and just recently began taking his metformin. He states that he has not had a hemoglobin A1c were seen a provider in approximately 2 years. He denies any fever, chills, weakness, chest pain, shortness of breath, or any other complaints at this time. He is unsure of his tetanus status. His blood pressure is 176/103, temperature 98°, heart rate 94, respirations 20, and oxygen saturation 96% on room air. He appears in no acute distress.  He is alert and oriented x4. GCS 15. Hypertensive with a blood pressure of 176/103 at the time of triage. Afebrile with a temperature 98°. Vital signs stable otherwise. He is a type 2 diabetic and appears poorly controlled. Point of care glucose today 455. He is unsure of his tetanus status so we will provide him with a  Boostrix 0.5 mL IM here in the emergency department. We will initiate IV access, send urine sample, obtain lab specimens for further evaluation of wound infection and hyperglycemia, send wound and blood cultures, perform an x-ray over the largest area of infection near the right ankle to rule out osteomyelitis, provide hydration with 1 L normal saline IV bolus, and initiate antibiotic therapy with clindamycin 600 mg IV. Magnesium was noted to be 1.6 we did replace with 2 g of IV magnesium here in the ED. Preliminary x-ray readings showed no acute fracture or subluxation and no mention of osteomyelitis. Final radiology read pending at the time of disposition. Treatment options were discussed with the patient and his family. He does not currently have established primary care and no adequate timely follow up at this time available. He has also been noncompliant with his home medications and for these reasons it was felt that he would benefit from observation hospital admission for treatment of cellulitis and nonketotic hyperglycemia. Case was discussed with Dr. Urrutia at [facility] who was in agreement with the plan of admission under observation status. We will continue IV fluid hydration, IV clindamycin, place him on a moderate scale of NovoLog, Lovenox for DVT prophylaxis, and resume his daily medications for hypertension and hyperlipidemia. All questions were answered and the patient and his family are in agreement.     Past Medical History:   Diagnosis Date    Diabetes mellitus, type 2     Hyperlipidemia     Hypertension        No past surgical history on file.    Review of patient's allergies indicates:  No Known Allergies    Current Facility-Administered Medications   Medication Dose Route Frequency Provider Last Rate Last Admin    acetaminophen tablet 650 mg  650 mg Oral Q8H PRN Dayday Hernandez FNP        atorvastatin tablet 40 mg  40 mg Oral QHS Dayday Hernandez FNP   40 mg at 05/05/24 2053     clindamycin in D5W 600 mg/50 mL IVPB 600 mg  600 mg Intravenous Q8H Dayday Hernandez, MAHESHP   Stopped at 24 0639    dextrose 50% injection 12.5 g  12.5 g Intravenous PRN Dayday Hernandez, FNP        dextrose 50% injection 25 g  25 g Intravenous PRN Dayday Hernandez, FNP        enoxaparin injection 40 mg  40 mg Subcutaneous Daily HernandezDayday JUDE FNP   40 mg at 24 1657    glucagon (human recombinant) injection 1 mg  1 mg Intramuscular PRN Dayday Hernandez, MAHESHP        glucose chewable tablet 16 g  16 g Oral PRN Dayday Hernandez, MAHESHP        glucose chewable tablet 24 g  24 g Oral PRN Dayday Hernandez FNP        losartan tablet 50 mg  50 mg Oral Daily Nito Junior IV, DO   50 mg at 24 0836    And    hydroCHLOROthiazide tablet 12.5 mg  12.5 mg Oral Daily Nito Junior IV, DO   12.5 mg at 24 0836    HYDROcodone-acetaminophen 5-325 mg per tablet 1 tablet  1 tablet Oral Q4H PRN Dayday Hernandez FNP   1 tablet at 24 0618    insulin aspart U-100 injection 0-10 Units  0-10 Units Subcutaneous QID (AC + HS) PRN Dayday Hernandez FNP   6 Units at 24 0634    Lactobacillus acidophilus capsule 1 capsule  1 capsule Oral TID WM Ragini Corral L, FNP        magnesium sulfate 2g in water 50mL IVPB (premix)  2 g Intravenous Once Ragini Corral FNP 25 mL/hr at 24 0931 2 g at 24 0931    melatonin tablet 6 mg  6 mg Oral Nightly PRN Dayday Hernandez FNP        sodium chloride 0.9% flush 10 mL  10 mL Intravenous PRN Dayday Hernandez FNP         Family History       Problem Relation (Age of Onset)    Diabetes Father    Heart disease Mother    Hypertension Father          Tobacco Use    Smoking status: Every Day     Current packs/day: 0.00     Types: Vaping with nicotine, Cigarettes     Last attempt to quit: 2019     Years since quittin.3    Smokeless tobacco: Never   Substance and Sexual Activity    Alcohol use: Not Currently     Comment: 6 pack  a day    Drug use: Never    Sexual activity: Not on file     Review of Systems   Constitutional:  Negative for appetite change and fatigue.   Respiratory:  Negative for cough, chest tightness and shortness of breath.    Cardiovascular:  Negative for chest pain.   Gastrointestinal:  Negative for abdominal distention, abdominal pain, constipation, diarrhea, nausea and vomiting.   Genitourinary:  Negative for difficulty urinating.   Skin:  Positive for wound.        Has scratch marks to left leg   Wounds from scratch marks , states from his dachund.   Cultured MSSA , sensitive to clindamycin  Samll amout clear drainage   No odor, afebrile , WBC normal    Psychiatric/Behavioral:  Negative for confusion.      Objective:     Vital Signs (Most Recent):  Temp: 98 °F (36.7 °C) (05/06/24 0732)  Pulse: 68 (05/06/24 0732)  Resp: 18 (05/06/24 0732)  BP: (!) 172/113 (05/06/24 0732)  SpO2: 97 % (05/06/24 0732) Vital Signs (24h Range):  Temp:  [97.6 °F (36.4 °C)-98.5 °F (36.9 °C)] 98 °F (36.7 °C)  Pulse:  [61-76] 68  Resp:  [16-20] 18  SpO2:  [97 %-99 %] 97 %  BP: (150-198)/() 172/113     Weight: 107 kg (236 lb)  Body mass index is 34.85 kg/m².     Physical Exam  HENT:      Head: Normocephalic.      Mouth/Throat:      Mouth: Mucous membranes are moist.   Cardiovascular:      Rate and Rhythm: Normal rate and regular rhythm.      Pulses: Normal pulses.      Heart sounds: Normal heart sounds.   Pulmonary:      Effort: Pulmonary effort is normal. No respiratory distress.      Breath sounds: Normal breath sounds. No stridor. No wheezing or rhonchi.   Abdominal:      General: Bowel sounds are normal. There is no distension.      Palpations: Abdomen is soft. There is no mass.      Tenderness: There is no abdominal tenderness.      Hernia: No hernia is present.   Skin:     Findings: Erythema present.      Comments: See pics in media      Redness and edema have lessened with treatment  Small amount clear drainage to wound right  lower leg. No odor. Afebrile   WBC normal  Wound culture shows growth of MSSA , sensitive to clindamycin   Continue abx   Neurological:      Mental Status: He is alert and oriented to person, place, and time.      Cranial Nerves: No cranial nerve deficit.      Motor: No weakness.   Psychiatric:         Mood and Affect: Mood normal.                Significant Labs: All pertinent labs within the past 24 hours have been reviewed.  Recent Lab Results  (Last 5 results in the past 24 hours)        05/06/24  0620   05/06/24  0604   05/05/24  2054   05/05/24  1624   05/05/24  1122        Anion Gap 12               Baso # 0.01               Basophil % 0.2               BUN 7               BUN/CREAT RATIO 12               Calcium 8.3               Chloride 102               CO2 26               Creatinine 0.58               Differential Method Auto               eGFR 122               Eos # 0.07               Eos % 1.2               Glucose 251               Hematocrit 40.5               Hemoglobin 14.3               Lymph # 1.71               Lymph % 28.9               Magnesium  1.6               MCH 32.8               MCHC 35.3               MCV 92.9               Mono # 0.50               Mono % 8.4               MPV 10.8               Neutrophils, Abs 3.63               Neutrophils Relative 61.3               Platelet Count 177               POC Glucose   251   295   218   302       Potassium 3.9               RBC 4.36               RDW 12.0               Sodium 136               WBC 5.92                                      Significant Imaging: I have reviewed all pertinent imaging results/findings within the past 24 hours.  Assessment/Plan:     * Cellulitis of multiple sites of lower extremity  5/4/24 Wound culture pending. Clindamycin 600mg IV Q8H until cultures result.     5/5/24 wound culture still pending.  Continuing to receive clindamycin IV.  Redness appears improved on today's exam in the right lower extremity.   Continue current care until cultures are resulted.    05/06/24 continues to have redness but less so. Culture grew heavy growth staph aureus -sensitive to clindamycin.    Hypomagnesemia  Patient has Abnormal Magnesium: hypomagnesemia. Will continue to monitor electrolytes closely. Will replace the affected electrolytes and repeat labs to be done after interventions completed. The patient's magnesium results have been reviewed and are listed below.  Recent Labs   Lab 05/06/24  0620   MG 1.6*        05/06/24 magnesium sulfate 2 gms IV     Other hyperlipidemia  5/4/24 Lipid panel pending. Will resume patient's previously prescribed statin and adjust as indicated once lipid panel results.   05/06/24   171    Cholesterol Total                 HDL           30    HDL     CHOL/HDLC Ratio           5.7    CHOL/HDLC Ratio     LDL Calculated           83    LDL Calculated     LDL/HDL Ratio           2.8    LDL/HDL Ratio     Non-HDL Cholesterol           141    Non-HDL Cholesterol     Triglycerides           292    Triglycerides     VLDL Cholesterol Ernesto           58    VLDL Cholesterol Ernesto     OTHER CHEM   Atorvastatin 40 mg po at hs     Type 2 diabetes mellitus with hyperglycemia, without long-term current use of insulin  5/4/24 A1C sent and pending. Glucose monitoring AC/HS. Diabetic Diet. Moderate Sliding Scale.      05/06/24 continue moderate SSI coverage   A1C pending   Start metformin 500 mg po BID     Essential hypertension  Chronic, uncontrolled. Latest blood pressure and vitals reviewed-     Temp:  [97.6 °F (36.4 °C)-98.5 °F (36.9 °C)]   Pulse:  [61-76]   Resp:  [16-20]   BP: (150-198)/()   SpO2:  [97 %-99 %] .   Home meds for hypertension were reviewed and noted below.   Hypertension Medications               losartan-hydrochlorothiazide 50-12.5 mg (HYZAAR) 50-12.5 mg per tablet Take 1 tablet by mouth once daily.            While in the hospital, will manage blood pressure as follows; Will resume patient's  previously prescribed home regimen and adjust accordingly and monitor blood pressure Q4h with routine vital signs.    Will utilize p.r.n. blood pressure medication only if patient's blood pressure greater than 180/110 and he develops symptoms such as worsening chest pain or shortness of breath.      05/06/24 DC fluids   Give losartan and hctz       VTE Risk Mitigation (From admission, onward)           Ordered     enoxaparin injection 40 mg  Daily         05/04/24 0045     IP VTE HIGH RISK PATIENT  Once         05/04/24 0045     Place sequential compression device  Until discontinued         05/04/24 0045                         On 05/06/2024, patient should be placed in hospital observation services under my care in collaboration with Jose Riley .          GERRY Peace  Department of Hospital Medicine  [facility]

## 2024-05-06 NOTE — PLAN OF CARE
Pt AAOX3. Resp even. NS at 125cc/hr infusing via pump into left hand without redness or edema noted. Dressing dry and intact to left and Right lower leg. Last bowel movement stated yesterday. Bed low. SR up X 3. CB within reach.   Problem: Adult Inpatient Plan of Care  Goal: Plan of Care Review  Outcome: Progressing  Goal: Patient-Specific Goal (Individualized)  Outcome: Progressing  Goal: Absence of Hospital-Acquired Illness or Injury  Outcome: Progressing  Goal: Optimal Comfort and Wellbeing  Outcome: Progressing  Goal: Readiness for Transition of Care  Outcome: Progressing     Problem: Wound  Goal: Optimal Coping  Outcome: Progressing  Goal: Optimal Functional Ability  Outcome: Progressing  Goal: Absence of Infection Signs and Symptoms  Outcome: Progressing  Goal: Improved Oral Intake  Outcome: Progressing  Goal: Optimal Pain Control and Function  Outcome: Progressing  Goal: Skin Health and Integrity  Outcome: Progressing  Goal: Optimal Wound Healing  Outcome: Progressing     Problem: Diabetes Comorbidity  Goal: Blood Glucose Level Within Targeted Range  Outcome: Progressing

## 2024-05-06 NOTE — ASSESSMENT & PLAN NOTE
5/4/24 Wound culture pending. Clindamycin 600mg IV Q8H until cultures result.     5/5/24 wound culture still pending.  Continuing to receive clindamycin IV.  Redness appears improved on today's exam in the right lower extremity.  Continue current care until cultures are resulted.    05/06/24 continues to have redness but less so. Culture grew heavy growth staph aureus -sensitive to clindamycin.

## 2024-05-06 NOTE — PLAN OF CARE
[facility]  Initial Discharge Assessment       Primary Care Provider: No, Primary Doctor    Admission Diagnosis: Hypomagnesemia [E83.42]  Cellulitis [L03.90]  Hyperglycemia [R73.9]  Dog scratch [W54.8XXA]  Cellulitis of multiple sites of lower extremity [L03.119]    Admission Date: 5/3/2024  Expected Discharge Date:     Transition of Care Barriers: (P) None    Payor: [employer] / Plan: [employer] [facility] [employer] [place] / Product Type: Commercial /     Extended Emergency Contact Information  Primary Emergency Contact: ARTIGABBIE  Mobile Phone: [phone]  Relation: Spouse  Preferred language: English   needed? No    Discharge Plan A: (P) Home with family         [facility] - [place], [place] - [address]  [address]  [place] [address]  Phone: [phone] Fax: [phone]    [facility] [address] [place] [address] - [address] [facility]  [address] [facility] [address]  Phone: [phone] Fax: [phone]      Initial Assessment (most recent)       Adult Discharge Assessment - 05/06/24 0905          Discharge Assessment    Assessment Type Discharge Planning Assessment     Confirmed/corrected address, phone number and insurance Yes     Confirmed Demographics Correct on Facesheet     Source of Information patient     Does patient/caregiver understand observation status Yes     Communicated NIDHI with patient/caregiver Date not available/Unable to determine     Reason For Admission Cellulitis of multiple sites of lower ext.     People in Home spouse     Do you expect to return to your current living situation? Yes     Do you have help at home or someone to help you manage your care at home? Yes     Who are your caregiver(s) and their phone number(s)? Gabbie Pedro [phone]     Prior to hospitilization cognitive status: Alert/Oriented     Current cognitive status: Alert/Oriented     Walking or Climbing Stairs Difficulty no      Dressing/Bathing Difficulty no     Equipment Currently Used at Home none     Readmission within 30 days? No     Patient currently being followed by outpatient case management? No     Do you currently have service(s) that help you manage your care at home? No     Do you take prescription medications? Yes (P)      Do you have prescription coverage? Yes (P)      Coverage Frederick Bhakta (P)      Do you have any problems affording any of your prescribed medications? No (P)      Is the patient taking medications as prescribed? yes (P)      Who is going to help you get home at discharge? Samra Pedro (P)      How do you get to doctors appointments? car, drives self (P)      Are you on dialysis? No (P)      Do you take coumadin? No (P)      Discharge Plan A Home with family (P)      DME Needed Upon Discharge  none (P)      Discharge Plan discussed with: Patient (P)      Transition of Care Barriers None (P)         Physical Activity    On average, how many days per week do you engage in moderate to strenuous exercise (like a brisk walk)? 4 days (P)      On average, how many minutes do you engage in exercise at this level? 20 min (P)         Financial Resource Strain    How hard is it for you to pay for the very basics like food, housing, medical care, and heating? Not hard at all (P)         Housing Stability    In the last 12 months, was there a time when you were not able to pay the mortgage or rent on time? No (P)      At any time in the past 12 months, were you homeless or living in a shelter (including now)? No (P)         Transportation Needs    In the past 12 months, has lack of transportation kept you from medical appointments or from getting medications? No (P)      In the past 12 months, has lack of transportation kept you from meetings, work, or from getting things needed for daily living? No (P)         Food Insecurity    Within the past 12 months, you worried that your food would run out before you got the  money to buy more. Never true (P)      Within the past 12 months, the food you bought just didn't last and you didn't have money to get more. Never true (P)         Stress    Do you feel stress - tense, restless, nervous, or anxious, or unable to sleep at night because your mind is troubled all the time - these days? Not at all (P)         Alcohol Use    Q1: How often do you have a drink containing alcohol? Never (P)      Q2: How many drinks containing alcohol do you have on a typical day when you are drinking? Patient does not drink (P)      Q3: How often do you have six or more drinks on one occasion? Never (P)                    Mr. Pedro lives at home with his wife and works everyday.  He doesn't use any DME or requires home health services. He will discharge back home with his wife.

## 2024-05-07 VITALS
WEIGHT: 236 LBS | RESPIRATION RATE: 20 BRPM | HEIGHT: 69 IN | DIASTOLIC BLOOD PRESSURE: 105 MMHG | SYSTOLIC BLOOD PRESSURE: 166 MMHG | TEMPERATURE: 98 F | HEART RATE: 70 BPM | OXYGEN SATURATION: 99 % | BODY MASS INDEX: 34.96 KG/M2

## 2024-05-07 LAB
ANION GAP SERPL CALCULATED.3IONS-SCNC: 12 MMOL/L (ref 7–16)
BUN SERPL-MCNC: 7 MG/DL (ref 7–18)
BUN/CREAT SERPL: 10 (ref 6–20)
CALCIUM SERPL-MCNC: 8.4 MG/DL (ref 8.5–10.1)
CHLORIDE SERPL-SCNC: 100 MMOL/L (ref 98–107)
CO2 SERPL-SCNC: 27 MMOL/L (ref 21–32)
CREAT SERPL-MCNC: 0.68 MG/DL (ref 0.7–1.3)
EGFR (NO RACE VARIABLE) (RUSH/TITUS): 116 ML/MIN/1.73M2
EOSINOPHIL NFR BLD MANUAL: 1 % (ref 1–4)
ERYTHROCYTE [DISTWIDTH] IN BLOOD BY AUTOMATED COUNT: 12.9 % (ref 11.5–14.5)
GLUCOSE SERPL-MCNC: 247 MG/DL (ref 74–106)
GLUCOSE SERPL-MCNC: 248 MG/DL (ref 70–105)
HCT VFR BLD AUTO: 40.8 % (ref 40–54)
HGB BLD-MCNC: 14.2 G/DL (ref 13.5–18)
LYMPHOCYTES NFR BLD MANUAL: 30 % (ref 27–41)
MCH RBC QN AUTO: 32.1 PG (ref 27–31)
MCHC RBC AUTO-ENTMCNC: 34.8 G/DL (ref 32–36)
MCV RBC AUTO: 92.3 FL (ref 80–96)
MONOCYTES NFR BLD MANUAL: 6 % (ref 2–6)
MPC BLD CALC-MCNC: 11.2 FL (ref 9.4–12.4)
NEUTS SEG NFR BLD MANUAL: 63 % (ref 50–62)
NRBC BLD MANUAL-RTO: ABNORMAL %
PLATELET # BLD AUTO: 167 K/UL (ref 150–400)
PLATELET MORPHOLOGY: NORMAL
POTASSIUM SERPL-SCNC: 3.9 MMOL/L (ref 3.5–5.1)
RBC # BLD AUTO: 4.42 M/UL (ref 4.6–6.2)
RBC MORPH BLD: NORMAL
SODIUM SERPL-SCNC: 135 MMOL/L (ref 136–145)
WBC # BLD AUTO: 5.5 K/UL (ref 4.5–11)

## 2024-05-07 PROCEDURE — 27000944

## 2024-05-07 PROCEDURE — 36415 COLL VENOUS BLD VENIPUNCTURE: CPT

## 2024-05-07 PROCEDURE — 27000982 HC MATTRESS, MATRIX LAL RENTAL

## 2024-05-07 PROCEDURE — 94761 N-INVAS EAR/PLS OXIMETRY MLT: CPT

## 2024-05-07 PROCEDURE — 63600175 PHARM REV CODE 636 W HCPCS

## 2024-05-07 PROCEDURE — 25000003 PHARM REV CODE 250: Performed by: NURSE PRACTITIONER

## 2024-05-07 PROCEDURE — 85025 COMPLETE CBC W/AUTO DIFF WBC: CPT

## 2024-05-07 PROCEDURE — 80048 BASIC METABOLIC PNL TOTAL CA: CPT

## 2024-05-07 PROCEDURE — 82962 GLUCOSE BLOOD TEST: CPT

## 2024-05-07 PROCEDURE — 25000003 PHARM REV CODE 250: Performed by: FAMILY MEDICINE

## 2024-05-07 RX ORDER — LOSARTAN POTASSIUM AND HYDROCHLOROTHIAZIDE 12.5; 5 MG/1; MG/1
1 TABLET ORAL DAILY
Qty: 30 TABLET | Refills: 0 | Status: SHIPPED | OUTPATIENT
Start: 2024-05-07 | End: 2024-06-06

## 2024-05-07 RX ORDER — ATORVASTATIN CALCIUM 40 MG/1
40 TABLET, FILM COATED ORAL DAILY
Qty: 30 TABLET | Refills: 0 | Status: SHIPPED | OUTPATIENT
Start: 2024-05-07 | End: 2024-06-06

## 2024-05-07 RX ORDER — SELENIUM 50 MCG
1 TABLET ORAL
Qty: 21 CAPSULE | Refills: 0 | Status: SHIPPED | OUTPATIENT
Start: 2024-05-07 | End: 2024-05-14

## 2024-05-07 RX ORDER — CEPHALEXIN 500 MG/1
500 CAPSULE ORAL EVERY 6 HOURS
Qty: 28 CAPSULE | Refills: 0 | Status: SHIPPED | OUTPATIENT
Start: 2024-05-07 | End: 2024-05-14

## 2024-05-07 RX ORDER — ACETAMINOPHEN 325 MG/1
650 TABLET ORAL EVERY 8 HOURS PRN
Start: 2024-05-07

## 2024-05-07 RX ORDER — METFORMIN HYDROCHLORIDE 500 MG/1
500 TABLET ORAL 2 TIMES DAILY WITH MEALS
Qty: 60 TABLET | Refills: 0 | Status: SHIPPED | OUTPATIENT
Start: 2024-05-07 | End: 2024-06-06

## 2024-05-07 RX ORDER — AMLODIPINE BESYLATE 5 MG/1
5 TABLET ORAL DAILY
Qty: 30 TABLET | Refills: 0 | Status: SHIPPED | OUTPATIENT
Start: 2024-05-08 | End: 2024-06-07

## 2024-05-07 RX ADMIN — Medication 1 CAPSULE: at 08:05

## 2024-05-07 RX ADMIN — METFORMIN HYDROCHLORIDE 500 MG: 500 TABLET ORAL at 08:05

## 2024-05-07 RX ADMIN — CLINDAMYCIN PHOSPHATE 600 MG: 600 INJECTION, SOLUTION INTRAVENOUS at 06:05

## 2024-05-07 RX ADMIN — INSULIN ASPART 4 UNITS: 100 INJECTION, SOLUTION INTRAVENOUS; SUBCUTANEOUS at 06:05

## 2024-05-07 RX ADMIN — HYDROCHLOROTHIAZIDE 12.5 MG: 12.5 TABLET ORAL at 08:05

## 2024-05-07 RX ADMIN — LOSARTAN POTASSIUM 50 MG: 50 TABLET, FILM COATED ORAL at 08:05

## 2024-05-07 RX ADMIN — AMLODIPINE BESYLATE 5 MG: 5 TABLET ORAL at 08:05

## 2024-05-07 NOTE — ASSESSMENT & PLAN NOTE
5/4/24 A1C sent and pending. Glucose monitoring AC/HS. Diabetic Diet. Moderate Sliding Scale.      05/06/24 continue moderate SSI coverage   A1C pending   Start metformin 500 mg po BID     05/07/24 continue metformin 50 mg po BID   Follow up with Kandy GARRETT

## 2024-05-07 NOTE — ASSESSMENT & PLAN NOTE
5/4/24 Wound culture pending. Clindamycin 600mg IV Q8H until cultures result.     5/5/24 wound culture still pending.  Continuing to receive clindamycin IV.  Redness appears improved on today's exam in the right lower extremity.  Continue current care until cultures are resulted.    05/06/24 continues to have redness but less so. Culture grew heavy growth staph aureus -sensitive to clindamycin.    05/07/24 deescalated to cephalexin 500 mg po every 6 for 7 days, continue probiotic while on cephalexin.   Clean right lower leg with betadine  and cover with dry dressing daily

## 2024-05-07 NOTE — DISCHARGE INSTRUCTIONS
Follow up with your PCP in 1-3 days    Take your medication as schedule    Check your blood sugar before each meals    Check your blood pressure 2-3 times a day and record results for your PCP    Activity as tolerated    Keep your wounds clean with soap and water, applied neosporin and cover

## 2024-05-07 NOTE — ASSESSMENT & PLAN NOTE
"Patient has Abnormal Magnesium: hypomagnesemia. Will continue to monitor electrolytes closely. Will replace the affected electrolytes and repeat labs to be done after interventions completed. The patient's magnesium results have been reviewed and are listed below.  No results for input(s): "MG" in the last 24 hours.       05/06/24 magnesium sulfate 2 gms IV   "

## 2024-05-07 NOTE — NURSING
Discharge Instr sheet given and explained. Pt voiced understanding to follow up with his PCP with blood pressure and blood sugar recordings. Wife at bedside. Pt will be transported via wheelchair to automobile

## 2024-05-07 NOTE — ASSESSMENT & PLAN NOTE
Chronic, uncontrolled. Latest blood pressure and vitals reviewed-     Temp:  [97.7 °F (36.5 °C)-98.1 °F (36.7 °C)]   Pulse:  [65-73]   Resp:  [1-20]   BP: (126-180)/()   SpO2:  [96 %-99 %] .   Home meds for hypertension were reviewed and noted below.   Hypertension Medications               losartan-hydrochlorothiazide 50-12.5 mg (HYZAAR) 50-12.5 mg per tablet Take 1 tablet by mouth once daily.            While in the hospital, will manage blood pressure as follows; Will resume patient's previously prescribed home regimen and adjust accordingly and monitor blood pressure Q4h with routine vital signs.    Will utilize p.r.n. blood pressure medication only if patient's blood pressure greater than 180/110 and he develops symptoms such as worsening chest pain or shortness of breath.      05/06/24 DC fluids   Give losartan and hctz     05/07/24 dc home . Continue losartan hctz and norvasc 5

## 2024-05-07 NOTE — ASSESSMENT & PLAN NOTE
5/4/24 Lipid panel pending. Will resume patient's previously prescribed statin and adjust as indicated once lipid panel results.   05/06/24   171    Cholesterol Total                 HDL           30    HDL     CHOL/HDLC Ratio           5.7    CHOL/HDLC Ratio     LDL Calculated           83    LDL Calculated     LDL/HDL Ratio           2.8    LDL/HDL Ratio     Non-HDL Cholesterol           141    Non-HDL Cholesterol     Triglycerides           292    Triglycerides     VLDL Cholesterol Ernesto           58    VLDL Cholesterol Ernesto     OTHER CHEM   Atorvastatin 40 mg po at hs     05/07/24 continue atorvastatin

## 2024-05-07 NOTE — PLAN OF CARE
Pt AAOX3. Resp even. Saline lock intact to Left hand without redness noted to site. Dressing dry and intact to Alejandro lower ext's. No c/o pain or discomfort voiced. Bed low. SR up X 2. CB within reach   Problem: Adult Inpatient Plan of Care  Goal: Plan of Care Review  Outcome: Progressing  Goal: Patient-Specific Goal (Individualized)  Outcome: Progressing  Goal: Absence of Hospital-Acquired Illness or Injury  Outcome: Progressing  Goal: Optimal Comfort and Wellbeing  Outcome: Progressing  Goal: Readiness for Transition of Care  Outcome: Progressing     Problem: Wound  Goal: Optimal Coping  Outcome: Progressing  Goal: Optimal Functional Ability  Outcome: Progressing  Goal: Absence of Infection Signs and Symptoms  Outcome: Progressing  Goal: Improved Oral Intake  Outcome: Progressing  Goal: Optimal Pain Control and Function  Outcome: Progressing  Goal: Skin Health and Integrity  Outcome: Progressing  Goal: Optimal Wound Healing  Outcome: Progressing     Problem: Diabetes Comorbidity  Goal: Blood Glucose Level Within Targeted Range  Outcome: Progressing

## 2024-05-07 NOTE — DISCHARGE SUMMARY
[facility] Surgical Unit  Hospital Medicine  Discharge Summary      Patient Name: Wallace Pedro  MRN: [id number]  SARAH: [id number]  Patient Class: IP- Inpatient  Admission Date: 5/3/2024  Hospital Length of Stay: 1 days  Discharge Date and Time:  05/07/2024 9:01 AM  Attending Physician: Jose Riley Jr., MD   Discharging Provider: GERRY Peace  Primary Care Provider: Jamilah Primary Doctor    Primary Care Team: Networked reference to record PCT     HPI:   Patient is a 48-year-old male who presents to emergency department for evaluation of wounds to his lower bilateral extremities. He reports that approximately 2 weeks prior his dogs scratched his legs. He reports that he thought it would get better on its own. He was recently been treating the wounds with peroxide and is now reporting that they are becoming more red and swollen especially in the right lower extremity. He does have a history of hypertension, hyperlipidemia and poorly-controlled type 2 diabetes. He does not regularly check his blood sugar and just recently began taking his metformin. He states that he has not had a hemoglobin A1c were seen a provider in approximately 2 years. He denies any fever, chills, weakness, chest pain, shortness of breath, or any other complaints at this time. He is unsure of his tetanus status. His blood pressure is 176/103, temperature 98°, heart rate 94, respirations 20, and oxygen saturation 96% on room air. He appears in no acute distress.  He is alert and oriented x4. GCS 15. Hypertensive with a blood pressure of 176/103 at the time of triage. Afebrile with a temperature 98°. Vital signs stable otherwise. He is a type 2 diabetic and appears poorly controlled. Point of care glucose today 455. He is unsure of his tetanus status so we will provide him with a Boostrix 0.5 mL IM here in the emergency department. We will initiate IV access, send urine sample, obtain lab specimens for further  evaluation of wound infection and hyperglycemia, send wound and blood cultures, perform an x-ray over the largest area of infection near the right ankle to rule out osteomyelitis, provide hydration with 1 L normal saline IV bolus, and initiate antibiotic therapy with clindamycin 600 mg IV. Magnesium was noted to be 1.6 we did replace with 2 g of IV magnesium here in the ED. Preliminary x-ray readings showed no acute fracture or subluxation and no mention of osteomyelitis. Final radiology read pending at the time of disposition. Treatment options were discussed with the patient and his family. He does not currently have established primary care and no adequate timely follow up at this time available. He has also been noncompliant with his home medications and for these reasons it was felt that he would benefit from observation hospital admission for treatment of cellulitis and nonketotic hyperglycemia. Case was discussed with Dr. Urrutia at [facility] who was in agreement with the plan of admission under observation status. We will continue IV fluid hydration, IV clindamycin, place him on a moderate scale of NovoLog, Lovenox for DVT prophylaxis, and resume his daily medications for hypertension and hyperlipidemia. All questions were answered and the patient and his family are in agreement.     * No surgery found *      Hospital Course:   5/5/24 patient resting comfortably this morning.  He reports that the discomfort in his right lower extremity has not improved this morning.  We did review photo documentation from the day before and compared to today's evaluation and it does appear that the redness is mildly improved today.  He was continuing to receive clindamycin IV and wound culture and blood cultures are expected later tonight for in the morning.  Case was discussed with Dr. Huerta who was in agreement with this plan and recommends no changes at this time.    05/06/24 /113- will add norvasc 5 mg po and  monitor response. Denies pain. Continue clindamycin.      05/07/24 BP improved with norvasc. Cellulitis to right lower leg improved. Will dc home today to continue cephalexin 500 mg po every 6 hours for 7 days. Talked with him re home meds. Was out of all meds. Sent prescriptions for metformin, atorvastatin, losartan hctz ,cephalexin and lactobacillus. States he would like to follow up with Joan GARRETT.      Goals of Care Treatment Preferences:  Code Status: Full Code      Consults:     Cardiac/Vascular  Other hyperlipidemia  5/4/24 Lipid panel pending. Will resume patient's previously prescribed statin and adjust as indicated once lipid panel results.   05/06/24   171    Cholesterol Total                 HDL           30    HDL     CHOL/HDLC Ratio           5.7    CHOL/HDLC Ratio     LDL Calculated           83    LDL Calculated     LDL/HDL Ratio           2.8    LDL/HDL Ratio     Non-HDL Cholesterol           141    Non-HDL Cholesterol     Triglycerides           292    Triglycerides     VLDL Cholesterol Ernesto           58    VLDL Cholesterol Ernesto     OTHER CHEM   Atorvastatin 40 mg po at hs     05/07/24 continue atorvastatin    Essential hypertension  Chronic, uncontrolled. Latest blood pressure and vitals reviewed-     Temp:  [97.7 °F (36.5 °C)-98.1 °F (36.7 °C)]   Pulse:  [65-73]   Resp:  [1-20]   BP: (126-180)/()   SpO2:  [96 %-99 %] .   Home meds for hypertension were reviewed and noted below.   Hypertension Medications               losartan-hydrochlorothiazide 50-12.5 mg (HYZAAR) 50-12.5 mg per tablet Take 1 tablet by mouth once daily.            While in the hospital, will manage blood pressure as follows; Will resume patient's previously prescribed home regimen and adjust accordingly and monitor blood pressure Q4h with routine vital signs.    Will utilize p.r.n. blood pressure medication only if patient's blood pressure greater than 180/110 and he develops symptoms such as worsening chest pain or  "shortness of breath.      05/06/24 DC fluids   Give losartan and hctz     05/07/24 dc home . Continue losartan hctz and norvasc 5     Renal/  Hypomagnesemia  Patient has Abnormal Magnesium: hypomagnesemia. Will continue to monitor electrolytes closely. Will replace the affected electrolytes and repeat labs to be done after interventions completed. The patient's magnesium results have been reviewed and are listed below.  No results for input(s): "MG" in the last 24 hours.       05/06/24 magnesium sulfate 2 gms IV     ID  * Cellulitis of multiple sites of lower extremity  5/4/24 Wound culture pending. Clindamycin 600mg IV Q8H until cultures result.     5/5/24 wound culture still pending.  Continuing to receive clindamycin IV.  Redness appears improved on today's exam in the right lower extremity.  Continue current care until cultures are resulted.    05/06/24 continues to have redness but less so. Culture grew heavy growth staph aureus -sensitive to clindamycin.    05/07/24 deescalated to cephalexin 500 mg po every 6 for 7 days, continue probiotic while on cephalexin.   Clean right lower leg with betadine  and cover with dry dressing daily     Endocrine  Type 2 diabetes mellitus with hyperglycemia, without long-term current use of insulin  5/4/24 A1C sent and pending. Glucose monitoring AC/HS. Diabetic Diet. Moderate Sliding Scale.      05/06/24 continue moderate SSI coverage   A1C pending   Start metformin 500 mg po BID     05/07/24 continue metformin 50 mg po BID   Follow up with Kandy GARRETT       Final Active Diagnoses:    Diagnosis Date Noted POA    PRINCIPAL PROBLEM:  Cellulitis of multiple sites of lower extremity [L03.119] 05/04/2024 Yes    Hypomagnesemia [E83.42] 05/06/2024 Yes    Type 2 diabetes mellitus with hyperglycemia, without long-term current use of insulin [E11.65] 05/04/2024 No    Other hyperlipidemia [E78.49] 05/04/2024 Yes    Essential hypertension [I10] 12/28/2022 Yes      Problems Resolved " During this Admission:       Discharged Condition: stable    Disposition: Home or Self Care    Follow Up:   Follow-up Information       Joan Willis. Schedule an appointment as soon as possible for a visit in 3 day(s).    Specialty: Family Medicine  Contact information:  [address]  [place] [address] [phone]                           Patient Instructions:      Diet diabetic       Significant Diagnostic Studies: Labs: All labs within the past 24 hours have been reviewed    Pending Diagnostic Studies:       None           Medications:  Reconciled Home Medications:      Medication List        START taking these medications      acetaminophen 325 MG tablet  Commonly known as: TYLENOL  Take 2 tablets (650 mg total) by mouth every 8 (eight) hours as needed for Temperature greater than or Pain (101).     amLODIPine 5 MG tablet  Commonly known as: NORVASC  Take 1 tablet (5 mg total) by mouth once daily.  Start taking on: May 8, 2024     cephALEXin 500 MG capsule  Commonly known as: KEFLEX  Take 1 capsule (500 mg total) by mouth every 6 (six) hours. for 7 days     Lactobacillus acidophilus 500 million cell Cap  Take 1 capsule by mouth 3 (three) times daily with meals. for 7 days            CHANGE how you take these medications      atorvastatin 40 MG tablet  Commonly known as: LIPITOR  Take 1 tablet (40 mg total) by mouth once daily.  What changed: additional instructions     metFORMIN 500 MG tablet  Commonly known as: GLUCOPHAGE  Take 1 tablet (500 mg total) by mouth 2 (two) times daily with meals.  What changed: Another medication with the same name was removed. Continue taking this medication, and follow the directions you see here.            CONTINUE taking these medications      losartan-hydrochlorothiazide 50-12.5 mg 50-12.5 mg per tablet  Commonly known as: HYZAAR  Take 1 tablet by mouth once daily.              Indwelling Lines/Drains at time of discharge:   Lines/Drains/Airways        None                   Time spent on the discharge of patient: 30 minutes         GERRY Peace  Department of Hospital Medicine  [facility]

## 2024-05-07 NOTE — PLAN OF CARE
Problem: Adult Inpatient Plan of Care  Goal: Optimal Comfort and Wellbeing  Outcome: Progressing     Problem: Wound  Goal: Optimal Coping  Outcome: Progressing

## 2024-05-07 NOTE — PLAN OF CARE
Problem: Adult Inpatient Plan of Care  Goal: Plan of Care Review  5/7/2024 0954 by Annel Mack RN  Outcome: Met  5/7/2024 0827 by Annel Mack RN  Outcome: Progressing  Goal: Patient-Specific Goal (Individualized)  5/7/2024 0954 by Annel Mack RN  Outcome: Met  5/7/2024 0827 by Annel Mack RN  Outcome: Progressing  Goal: Absence of Hospital-Acquired Illness or Injury  5/7/2024 0954 by Annel Mack RN  Outcome: Met  5/7/2024 0827 by Annel Mack RN  Outcome: Progressing  Goal: Optimal Comfort and Wellbeing  5/7/2024 0954 by Annel Mack RN  Outcome: Met  5/7/2024 0827 by Annel Mack RN  Outcome: Progressing  Goal: Readiness for Transition of Care  5/7/2024 0954 by Annel Mack RN  Outcome: Met  5/7/2024 0827 by Annel Mack RN  Outcome: Progressing     Problem: Wound  Goal: Optimal Coping  5/7/2024 0954 by Annel Mack RN  Outcome: Met  5/7/2024 0827 by Annel Mack RN  Outcome: Progressing  Goal: Optimal Functional Ability  5/7/2024 0954 by Annel Mack RN  Outcome: Met  5/7/2024 0827 by Annel Mack RN  Outcome: Progressing  Goal: Absence of Infection Signs and Symptoms  5/7/2024 0954 by Annel Mack RN  Outcome: Met  5/7/2024 0827 by Annel Mack RN  Outcome: Progressing  Goal: Improved Oral Intake  5/7/2024 0954 by Annel Mack RN  Outcome: Met  5/7/2024 0827 by Annel Mack RN  Outcome: Progressing  Goal: Optimal Pain Control and Function  5/7/2024 0954 by Annel Mack RN  Outcome: Met  5/7/2024 0827 by Annel Mack RN  Outcome: Progressing  Goal: Skin Health and Integrity  5/7/2024 0954 by Annel Mack, RN  Outcome: Met  5/7/2024 0827 by Annel Mack, RN  Outcome: Progressing  Goal: Optimal Wound Healing  5/7/2024 0954 by Annel Mack, RN  Outcome: Met  5/7/2024 0827 by Annel Mack, RN  Outcome: Progressing     Problem: Diabetes Comorbidity  Goal: Blood Glucose Level Within Targeted  Range  5/7/2024 0954 by Annel Mack, RN  Outcome: Met  5/7/2024 0827 by Annel Mack, RN  Outcome: Progressing

## 2024-05-07 NOTE — PLAN OF CARE
[facility]  Discharge Final Note    Primary Care Provider: No, Primary Doctor    Expected Discharge Date: 5/7/2024    Final Discharge Note (most recent)       Final Note - 05/07/24 0946          Final Note    Assessment Type Final Discharge Note (P)      Anticipated Discharge Disposition Home or Self Care (P)      What phone number can be called within the next 1-3 days to see how you are doing after discharge? [phone] (P)      Hospital Resources/Appts/Education Provided Provided patient/caregiver with written discharge plan information;Provided education on problems/symptoms using teachback;Appointments scheduled and added to AVS (P)         Post-Acute Status    Coverage Ambetter (P)      Discharge Delays None known at this time (P)                      Important Message from Medicare             Contact Joan Whitfield   Specialty: Family Medicine    [facility] [place]  [address] [facility] [place] [address]   Phone: [phone]       Next Steps: Schedule an appointment as soon as possible for a visit in 3 day(s)        Mr. Pedro will discharge home with his wife.  No needs for Case Management department at this time. He will follow up with Joan Willis within 3 days.

## 2024-05-10 LAB
BACTERIA BLD CULT: NORMAL
BACTERIA BLD CULT: NORMAL

## 2024-05-15 ENCOUNTER — TELEPHONE (OUTPATIENT)
Dept: MEDSURG UNIT | Facility: HOSPITAL | Age: 49
End: 2024-05-15
Payer: COMMERCIAL

## 2024-05-15 NOTE — TELEPHONE ENCOUNTER
Mr. Pedro says he's been doing good, the wound looks good on his leg, he had an appointment today at [facility] and will return next Wednesday.

## 2024-11-25 ENCOUNTER — HOSPITAL ENCOUNTER (EMERGENCY)
Facility: HOSPITAL | Age: 49
Discharge: HOME | End: 2024-11-25

## 2024-11-25 VITALS
RESPIRATION RATE: 18 BRPM | TEMPERATURE: 99 F | OXYGEN SATURATION: 96 % | WEIGHT: 223.81 LBS | DIASTOLIC BLOOD PRESSURE: 101 MMHG | SYSTOLIC BLOOD PRESSURE: 166 MMHG | HEART RATE: 90 BPM | BODY MASS INDEX: 33.15 KG/M2 | HEIGHT: 69 IN

## 2024-11-25 DIAGNOSIS — I10 PRIMARY HYPERTENSION: ICD-10-CM

## 2024-11-25 DIAGNOSIS — I10 HYPERTENSION, UNSPECIFIED TYPE: ICD-10-CM

## 2024-11-25 DIAGNOSIS — L97.509 DIABETIC ULCER OF TOE: ICD-10-CM

## 2024-11-25 DIAGNOSIS — R73.9 HYPERGLYCEMIA: Primary | ICD-10-CM

## 2024-11-25 DIAGNOSIS — Z91.89 AT RISK FOR ACUTE ISCHEMIC CARDIAC EVENT: ICD-10-CM

## 2024-11-25 DIAGNOSIS — E11.621 DIABETIC ULCER OF TOE: ICD-10-CM

## 2024-11-25 DIAGNOSIS — E11.9 TYPE 2 DIABETES MELLITUS WITHOUT COMPLICATION, UNSPECIFIED WHETHER LONG TERM INSULIN USE: ICD-10-CM

## 2024-11-25 LAB
ALBUMIN SERPL BCP-MCNC: 3.6 G/DL (ref 3.5–5)
ALBUMIN/GLOB SERPL: 1 {RATIO}
ALP SERPL-CCNC: 165 U/L (ref 40–150)
ALT SERPL W P-5'-P-CCNC: 18 U/L
ANION GAP SERPL CALCULATED.3IONS-SCNC: 14 MMOL/L (ref 7–16)
AST SERPL W P-5'-P-CCNC: 14 U/L (ref 5–34)
BACTERIA #/AREA URNS HPF: ABNORMAL /HPF
BASOPHILS # BLD AUTO: 0.02 K/UL (ref 0–0.2)
BASOPHILS NFR BLD AUTO: 0.3 % (ref 0–1)
BILIRUB SERPL-MCNC: 0.7 MG/DL
BILIRUB UR QL STRIP: NEGATIVE
BUN SERPL-MCNC: 9 MG/DL (ref 9–21)
BUN/CREAT SERPL: 10 (ref 6–20)
CALCIUM SERPL-MCNC: 9.2 MG/DL (ref 8.4–10.2)
CHLORIDE SERPL-SCNC: 99 MMOL/L (ref 98–107)
CLARITY UR: CLEAR
CO2 SERPL-SCNC: 26 MMOL/L (ref 22–29)
COLOR UR: YELLOW
CREAT SERPL-MCNC: 0.87 MG/DL (ref 0.72–1.25)
DIFFERENTIAL METHOD BLD: ABNORMAL
EGFR (NO RACE VARIABLE) (RUSH/TITUS): 107 ML/MIN/1.73M2
EOSINOPHIL # BLD AUTO: 0.05 K/UL (ref 0–0.5)
EOSINOPHIL NFR BLD AUTO: 0.8 % (ref 1–4)
ERYTHROCYTE [DISTWIDTH] IN BLOOD BY AUTOMATED COUNT: 12.6 % (ref 11.5–14.5)
GLOBULIN SER-MCNC: 3.7 G/DL (ref 2–4)
GLUCOSE SERPL-MCNC: 225 MG/DL (ref 70–105)
GLUCOSE SERPL-MCNC: 256 MG/DL (ref 70–105)
GLUCOSE SERPL-MCNC: 350 MG/DL (ref 70–105)
GLUCOSE SERPL-MCNC: 399 MG/DL (ref 74–100)
GLUCOSE UR STRIP-MCNC: >=1000 MG/DL
HCT VFR BLD AUTO: 45.2 % (ref 40–54)
HGB BLD-MCNC: 15.5 G/DL (ref 13.5–18)
IMM GRANULOCYTES # BLD AUTO: 0.01 K/UL (ref 0–0.04)
IMM GRANULOCYTES NFR BLD: 0.2 % (ref 0–0.4)
KETONES UR STRIP-SCNC: 15 MG/DL
LEUKOCYTE ESTERASE UR QL STRIP: NEGATIVE
LYMPHOCYTES # BLD AUTO: 1.31 K/UL (ref 1–4.8)
LYMPHOCYTES NFR BLD AUTO: 20.3 % (ref 27–41)
MCH RBC QN AUTO: 32.3 PG (ref 27–31)
MCHC RBC AUTO-ENTMCNC: 34.3 G/DL (ref 32–36)
MCV RBC AUTO: 94.2 FL (ref 80–96)
MONOCYTES # BLD AUTO: 0.47 K/UL (ref 0–0.8)
MONOCYTES NFR BLD AUTO: 7.3 % (ref 2–6)
MPC BLD CALC-MCNC: 10.3 FL (ref 9.4–12.4)
NEUTROPHILS # BLD AUTO: 4.6 K/UL (ref 1.8–7.7)
NEUTROPHILS NFR BLD AUTO: 71.1 % (ref 53–65)
NITRITE UR QL STRIP: NEGATIVE
NRBC # BLD AUTO: 0 X10E3/UL
NRBC, AUTO (.00): 0 %
PH UR STRIP: 5.5 PH UNITS
PLATELET # BLD AUTO: 161 K/UL (ref 150–400)
POTASSIUM SERPL-SCNC: 4 MMOL/L (ref 3.5–5.1)
PROT SERPL-MCNC: 7.3 G/DL (ref 6.4–8.3)
PROT UR QL STRIP: 30
RBC # BLD AUTO: 4.8 M/UL (ref 4.6–6.2)
RBC # UR STRIP: NEGATIVE /UL
RBC #/AREA URNS HPF: ABNORMAL /HPF
SODIUM SERPL-SCNC: 135 MMOL/L (ref 136–145)
SP GR UR STRIP: 1.02
SQUAMOUS #/AREA URNS LPF: ABNORMAL /LPF
UROBILINOGEN UR STRIP-ACNC: 0.2 MG/DL
WBC # BLD AUTO: 6.46 K/UL (ref 4.5–11)
WBC #/AREA URNS HPF: ABNORMAL /HPF

## 2024-11-25 PROCEDURE — 96361 HYDRATE IV INFUSION ADD-ON: CPT

## 2024-11-25 PROCEDURE — 99284 EMERGENCY DEPT VISIT MOD MDM: CPT | Mod: 25

## 2024-11-25 PROCEDURE — 80053 COMPREHEN METABOLIC PANEL: CPT | Performed by: NURSE PRACTITIONER

## 2024-11-25 PROCEDURE — 99284 EMERGENCY DEPT VISIT MOD MDM: CPT | Mod: ,,, | Performed by: NURSE PRACTITIONER

## 2024-11-25 PROCEDURE — 81003 URINALYSIS AUTO W/O SCOPE: CPT | Performed by: NURSE PRACTITIONER

## 2024-11-25 PROCEDURE — 82962 GLUCOSE BLOOD TEST: CPT

## 2024-11-25 PROCEDURE — 36415 COLL VENOUS BLD VENIPUNCTURE: CPT | Performed by: NURSE PRACTITIONER

## 2024-11-25 PROCEDURE — 25000003 PHARM REV CODE 250: Performed by: NURSE PRACTITIONER

## 2024-11-25 PROCEDURE — 63600175 PHARM REV CODE 636 W HCPCS: Performed by: NURSE PRACTITIONER

## 2024-11-25 PROCEDURE — 96376 TX/PRO/DX INJ SAME DRUG ADON: CPT

## 2024-11-25 PROCEDURE — 96374 THER/PROPH/DIAG INJ IV PUSH: CPT

## 2024-11-25 PROCEDURE — 85025 COMPLETE CBC W/AUTO DIFF WBC: CPT | Performed by: NURSE PRACTITIONER

## 2024-11-25 RX ORDER — AMLODIPINE BESYLATE 5 MG/1
5 TABLET ORAL DAILY
Qty: 30 TABLET | Refills: 0 | Status: SHIPPED | OUTPATIENT
Start: 2024-11-25 | End: 2024-12-25

## 2024-11-25 RX ORDER — ACETAMINOPHEN 500 MG
1000 TABLET ORAL
Status: COMPLETED | OUTPATIENT
Start: 2024-11-25 | End: 2024-11-25

## 2024-11-25 RX ORDER — METFORMIN HYDROCHLORIDE 500 MG/1
500 TABLET ORAL 2 TIMES DAILY WITH MEALS
Qty: 60 TABLET | Refills: 0 | Status: SHIPPED | OUTPATIENT
Start: 2024-11-25 | End: 2024-12-25

## 2024-11-25 RX ORDER — ATORVASTATIN CALCIUM 40 MG/1
40 TABLET, FILM COATED ORAL DAILY
Qty: 30 TABLET | Refills: 0 | Status: SHIPPED | OUTPATIENT
Start: 2024-11-25 | End: 2024-12-25

## 2024-11-25 RX ORDER — LOSARTAN POTASSIUM AND HYDROCHLOROTHIAZIDE 12.5; 5 MG/1; MG/1
1 TABLET ORAL DAILY
Qty: 30 TABLET | Refills: 0 | Status: SHIPPED | OUTPATIENT
Start: 2024-11-25 | End: 2024-12-25

## 2024-11-25 RX ORDER — MUPIROCIN 20 MG/G
OINTMENT TOPICAL 3 TIMES DAILY
Qty: 22 G | Refills: 0 | Status: SHIPPED | OUTPATIENT
Start: 2024-11-25

## 2024-11-25 RX ORDER — CEPHALEXIN 500 MG/1
500 CAPSULE ORAL EVERY 8 HOURS
Qty: 30 CAPSULE | Refills: 0 | Status: SHIPPED | OUTPATIENT
Start: 2024-11-25 | End: 2024-12-05

## 2024-11-25 RX ADMIN — ACETAMINOPHEN 1000 MG: 500 TABLET ORAL at 09:11

## 2024-11-25 RX ADMIN — HUMAN INSULIN 5 UNITS: 100 INJECTION, SOLUTION SUBCUTANEOUS at 07:11

## 2024-11-25 RX ADMIN — SODIUM CHLORIDE 1000 ML: 9 INJECTION, SOLUTION INTRAVENOUS at 07:11

## 2024-11-25 RX ADMIN — HUMAN INSULIN 5 UNITS: 100 INJECTION, SOLUTION SUBCUTANEOUS at 09:11

## 2024-11-25 RX ADMIN — BACITRACIN ZINC, NEOMYCIN, POLYMYXIN B 1 EACH: 400; 3.5; 5 OINTMENT TOPICAL at 09:11

## 2024-11-25 NOTE — ED NOTES
Presents to ER with c/o foot pain. Has a reddened area to to the 4th toe on left foot. States he was wearing his boots and an ulcer came up on his toe 5 days ago and blister popped and he has been trying to keep area clean. This morning he had some sharp pains from his toe up in his foot. Continues to have shooting pain. States does not have a lot of feeling in his feet due to being a diabetic.

## 2024-11-25 NOTE — ED PROVIDER NOTES
Encounter Date: 2024       History     Chief Complaint   Patient presents with    Foot Injury     Presented with c/o pain and ulceration to left 4th toe that started out as a blister on his left 4 th toe. PMH DM, HTN and has not taken any medication in 3 months. Does not check sugars either. Denies fever or chills. Report peripheral neuropathy and notes does not usually feel pain in feet. States pain is radiating from toe up foot this am. Denies chest pain, SOB, abd pain or n/v/d.        Review of patient's allergies indicates:  No Known Allergies  Past Medical History:   Diagnosis Date    Diabetes mellitus, type 2     Hyperlipidemia     Hypertension      History reviewed. No pertinent surgical history.  Family History   Problem Relation Name Age of Onset    Heart disease Mother      Diabetes Father      Hypertension Father       Social History     Tobacco Use    Smoking status: Every Day     Current packs/day: 0.00     Types: Vaping with nicotine, Cigarettes     Last attempt to quit: 2019     Years since quittin.9    Smokeless tobacco: Never   Substance Use Topics    Alcohol use: Yes     Comment: 6 pack a day    Drug use: Never     Review of Systems   Constitutional:  Positive for activity change. Negative for appetite change and fever.   HENT:  Negative for congestion.    Respiratory:  Negative for cough, shortness of breath and wheezing.    Cardiovascular:  Negative for chest pain.   Gastrointestinal:  Negative for abdominal pain, diarrhea, nausea and vomiting.   Genitourinary: Negative.    Musculoskeletal:  Positive for arthralgias.   Skin:  Positive for wound (left 4th toe).   Neurological:  Negative for dizziness, weakness and headaches.   Psychiatric/Behavioral: Negative.         Physical Exam     Initial Vitals [24 0621]   BP Pulse Resp Temp SpO2   (!) 189/107 83 18 98.5 °F (36.9 °C) 96 %      MAP       --         Physical Exam    Nursing note and vitals reviewed.  Constitutional: He appears  well-developed and well-nourished. No distress.   HENT:   Head: Normocephalic.   Right Ear: External ear normal.   Left Ear: External ear normal.   Nose: Nose normal. Mouth/Throat: Oropharynx is clear and moist.   Eyes: Conjunctivae and EOM are normal.   Neck: Neck supple.   Normal range of motion.  Cardiovascular:  Normal rate, regular rhythm, normal heart sounds and intact distal pulses.           No murmur heard.  Pulmonary/Chest: Breath sounds normal. He has no wheezes. He has no rhonchi. He has no rales.   Abdominal: Abdomen is soft. Bowel sounds are normal. There is no abdominal tenderness.   Musculoskeletal:         General: Normal range of motion.      Cervical back: Normal range of motion and neck supple.     Neurological: He is alert and oriented to person, place, and time. He has normal strength. GCS score is 15. GCS eye subscore is 4. GCS verbal subscore is 5. GCS motor subscore is 6.   Skin: Skin is warm and dry.   Drng ulceration to left 4th toe. Tenderness and erythema to toe and adjacent foot. Multiple wounds to legs in various stages of healing noted.         Medical Screening Exam   See Full Note    ED Course   Procedures  Labs Reviewed   COMPREHENSIVE METABOLIC PANEL - Abnormal       Result Value    Sodium 135 (*)     Potassium 4.0      Chloride 99      CO2 26      Anion Gap 14      Glucose 399 (*)     BUN 9      Creatinine 0.87      BUN/Creatinine Ratio 10      Calcium 9.2      Total Protein 7.3      Albumin 3.6      Globulin 3.7      A/G Ratio 1.0      Bilirubin, Total 0.7      Alk Phos 165 (*)     ALT 18      AST 14      eGFR 107     URINALYSIS, REFLEX TO URINE CULTURE - Abnormal    Color, UA Yellow      Clarity, UA Clear      pH, UA 5.5      Leukocytes, UA Negative      Nitrites, UA Negative      Protein, UA 30 (*)     Glucose, UA >=1000 (*)     Ketones, UA 15 (*)     Urobilinogen, UA 0.2      Bilirubin, UA Negative      Blood, UA Negative      Specific Gravity, UA 1.020     CBC WITH  DIFFERENTIAL - Abnormal    WBC 6.46      RBC 4.80      Hemoglobin 15.5      Hematocrit 45.2      MCV 94.2      MCH 32.3 (*)     MCHC 34.3      RDW 12.6      Platelet Count 161      MPV 10.3      Neutrophils % 71.1 (*)     Lymphocytes % 20.3 (*)     Monocytes % 7.3 (*)     Eosinophils % 0.8 (*)     Basophils % 0.3      Immature Granulocytes % 0.2      nRBC, Auto 0.0      Neutrophils, Abs 4.60      Lymphocytes, Absolute 1.31      Monocytes, Absolute 0.47      Eosinophils, Absolute 0.05      Basophils, Absolute 0.02      Immature Granulocytes, Absolute 0.01      nRBC, Absolute 0.00      Diff Type Auto     URINALYSIS, MICROSCOPIC - Abnormal    WBC, UA None Seen      RBC, UA 0-3      Bacteria, UA Occasional (*)     Squamous Epithelial Cells, UA Occasional (*)    POCT GLUCOSE MONITORING CONTINUOUS - Abnormal    POC Glucose 350 (*)    POCT GLUCOSE MONITORING CONTINUOUS - Abnormal    POC Glucose 256 (*)    CBC W/ AUTO DIFFERENTIAL    Narrative:     The following orders were created for panel order CBC auto differential.  Procedure                               Abnormality         Status                     ---------                               -----------         ------                     CBC with Differential[[id number]]       Abnormal            Final result                 Please view results for these tests on the individual orders.          Imaging Results              X-Ray Foot Complete Left (Final result)  Result time 11/25/24 08:31:09      Final result by Michael Limon MD (11/25/24 08:31:09)                   Impression:      Soft tissue edema.  No acute displaced fracture.      Electronically signed by: Michael Limon MD  Date:    11/25/2024  Time:    08:31               Narrative:    EXAMINATION:  XR FOOT COMPLETE 3 VIEW LEFT    CLINICAL HISTORY:  Type 2 diabetes mellitus with foot ulcer    TECHNIQUE:  Three views of the left foot.    COMPARISON:  None    FINDINGS:  No acute displaced fracture.  No  dislocation.  Minimal plantar calcaneal spur.  Soft tissue edema and punctate soft tissue calcifications.  No unexpected radiopaque foreign body.                                       Medications   sodium chloride 0.9% bolus 1,000 mL 1,000 mL (0 mLs Intravenous Stopped 11/25/24 0849)   insulin regular injection 5 Units 0.05 mL (5 Units Intravenous Given 11/25/24 0752)   neomycin-bacitracnZn-polymyxnB packet (1 each Topical (Top) Given 11/25/24 0913)   insulin regular injection 5 Units 0.05 mL (5 Units Intravenous Given 11/25/24 0914)   acetaminophen tablet 1,000 mg (1,000 mg Oral Given 11/25/24 0913)     Medical Decision Making  Presented with c/o pain and ulceration to left 4th toe that started out as a blister on his left 4 th toe. PMH DM, HTN and has not taken any medication in 3 months. Does not check sugars either. Denies fever or chills. Report peripheral neuropathy and notes does not usually feel pain in feet. States pain is radiating from toe up foot this am. Denies chest pain, SOB, abd pain or n/v/d.    Problems Addressed:  Diabetic ulcer of toe: acute illness or injury that poses a threat to life or bodily functions  Hyperglycemia: chronic illness or injury with exacerbation, progression, or side effects of treatment  Hypertension, unspecified type: chronic illness or injury with exacerbation, progression, or side effects of treatment    Amount and/or Complexity of Data Reviewed  External Data Reviewed: notes.  Labs: ordered. Decision-making details documented in ED Course.  Radiology: ordered. Decision-making details documented in ED Course.    Risk  OTC drugs.  Prescription drug management.               ED Course as of 11/25/24 1019   Mon Nov 25, 2024   0700 BP(!): 189/107 [AG]   0708 WBC: 6.46 [AG]   0740 Glucose(!): 399 [AG]   0851 POC Glucose(!): 350 [AG]   0852 Pts wound appears like a blisters that has now drained and lost the overlying tissue.  Will have RN perform wound care.  Will place on oral  antibiotics due to high risk of his uncontrolled DM.  Will also e [AG]   0852 Pts wound appears like a blisters that has now drained and lost the overlying tissue.  Will have RN perform wound care.  Will place on oral antibitoics due to high risk of his uncontrolled DM.  Will also explained wound care and prescribe bactroban ointment.  Will refer to his PCP. [AG]   0905 , will give Insulin 5 units and recheck in an hour. [AG]   0926 POC Glucose(!): 256 [AG]   1014 Will refill patients home meds [AG]   1017 , will dc home. [AG]      ED Course User Index  [AG] Gabbie Butcher FNP                           Clinical Impression:   Final diagnoses:  [E11.621, L97.509] Diabetic ulcer of toe  [R73.9] Hyperglycemia (Primary)  [I10] Hypertension, unspecified type        ED Disposition Condition    Discharge Stable          ED Prescriptions       Medication Sig Dispense Start Date End Date Auth. Provider    amLODIPine (NORVASC) 5 MG tablet Take 1 tablet (5 mg total) by mouth once daily. 30 tablet 11/25/2024 12/25/2024 Gabbie Butcher FNP    atorvastatin (LIPITOR) 40 MG tablet Take 1 tablet (40 mg total) by mouth once daily. 30 tablet 11/25/2024 12/25/2024 Gabbie Butcher FNP    losartan-hydrochlorothiazide 50-12.5 mg (HYZAAR) 50-12.5 mg per tablet Take 1 tablet by mouth once daily. 30 tablet 11/25/2024 12/25/2024 Gabbie Butcher FNP    metFORMIN (GLUCOPHAGE) 500 MG tablet Take 1 tablet (500 mg total) by mouth 2 (two) times daily with meals. 60 tablet 11/25/2024 12/25/2024 Gabbie Butcher FNP    cephALEXin (KEFLEX) 500 MG capsule Take 1 capsule (500 mg total) by mouth every 8 (eight) hours. for 10 days 30 capsule 11/25/2024 12/5/2024 Gabbie Butcher FNP    mupirocin (BACTROBAN) 2 % ointment Apply topically 3 (three) times daily. 22 g 11/25/2024 -- Gabbie Butcher FNP          Follow-up Information       Follow up With Specialties Details Why Contact Info    [facility] Call today  [address]  [facility] ([place], [place]) [address]  [phone]               Gabbie Butcher, [facility]  11/25/24 1019

## 2024-11-25 NOTE — DISCHARGE INSTRUCTIONS
Clean the wound twice daily using over the counter antibacterial soap (such as dial soap) and water.  Pat dry.  Apply the prescribed oral antibiotic as ordered.  Do not use hydrogen peroxide or rubbing alcohol to clean the wound.  Return to the ER for redness, swelling, purulent drainage or fever.   Continue your home meds for your blood pressure and your diabetes.  Take the oral antibiotic as prescribed.  Call your family practitioner TODAY.  You need to schedule close follow-up with her.  If you do not take care of your foot wound, you could lose your toe or worse.  Return to the ER for fever or worsening of your symptoms.

## 2025-07-02 ENCOUNTER — OFFICE VISIT (OUTPATIENT)
Dept: FAMILY MEDICINE | Facility: CLINIC | Age: 50
End: 2025-07-02

## 2025-07-02 ENCOUNTER — TELEPHONE (OUTPATIENT)
Dept: PHARMACY | Facility: CLINIC | Age: 50
End: 2025-07-02

## 2025-07-02 ENCOUNTER — RESULTS FOLLOW-UP (OUTPATIENT)
Dept: PHARMACY | Facility: CLINIC | Age: 50
End: 2025-07-02

## 2025-07-02 VITALS
OXYGEN SATURATION: 99 % | BODY MASS INDEX: 33.38 KG/M2 | DIASTOLIC BLOOD PRESSURE: 99 MMHG | TEMPERATURE: 99 F | HEART RATE: 91 BPM | HEIGHT: 69 IN | SYSTOLIC BLOOD PRESSURE: 160 MMHG | WEIGHT: 225.38 LBS

## 2025-07-02 DIAGNOSIS — E11.65 UNCONTROLLED TYPE 2 DIABETES MELLITUS WITH HYPERGLYCEMIA: ICD-10-CM

## 2025-07-02 DIAGNOSIS — Z11.59 ENCOUNTER FOR HEPATITIS C SCREENING TEST FOR LOW RISK PATIENT: Primary | ICD-10-CM

## 2025-07-02 DIAGNOSIS — S81.801A WOUND OF RIGHT LOWER EXTREMITY, INITIAL ENCOUNTER: ICD-10-CM

## 2025-07-02 DIAGNOSIS — Z91.89 AT RISK FOR ACUTE ISCHEMIC CARDIAC EVENT: ICD-10-CM

## 2025-07-02 DIAGNOSIS — I10 PRIMARY HYPERTENSION: ICD-10-CM

## 2025-07-02 DIAGNOSIS — L97.519 ULCER OF RIGHT FOOT, UNSPECIFIED ULCER STAGE: ICD-10-CM

## 2025-07-02 LAB
ALBUMIN SERPL BCP-MCNC: 3.2 G/DL (ref 3.5–5)
ALBUMIN/GLOB SERPL: 0.7 {RATIO}
ALP SERPL-CCNC: 146 U/L (ref 40–150)
ALT SERPL W P-5'-P-CCNC: 13 U/L
ANION GAP SERPL CALCULATED.3IONS-SCNC: 13 MMOL/L (ref 7–16)
AST SERPL W P-5'-P-CCNC: 15 U/L (ref 11–45)
BASOPHILS # BLD AUTO: 0.04 K/UL (ref 0–0.2)
BASOPHILS NFR BLD AUTO: 0.4 % (ref 0–1)
BILIRUB SERPL-MCNC: 0.5 MG/DL
BUN SERPL-MCNC: 7 MG/DL (ref 9–21)
BUN/CREAT SERPL: 9 (ref 6–20)
CALCIUM SERPL-MCNC: 8.8 MG/DL (ref 8.4–10.2)
CHLORIDE SERPL-SCNC: 100 MMOL/L (ref 98–107)
CHOLEST SERPL-MCNC: 189 MG/DL
CHOLEST/HDLC SERPL: 4.8 {RATIO}
CO2 SERPL-SCNC: 26 MMOL/L (ref 22–29)
CREAT SERPL-MCNC: 0.75 MG/DL (ref 0.72–1.25)
CREAT UR-MCNC: 62 MG/DL (ref 23–375)
DIFFERENTIAL METHOD BLD: ABNORMAL
EGFR (NO RACE VARIABLE) (RUSH/TITUS): 112 ML/MIN/1.73M2
EOSINOPHIL # BLD AUTO: 0.07 K/UL (ref 0–0.5)
EOSINOPHIL NFR BLD AUTO: 0.8 % (ref 1–4)
ERYTHROCYTE [DISTWIDTH] IN BLOOD BY AUTOMATED COUNT: 12.2 % (ref 11.5–14.5)
EST. AVERAGE GLUCOSE BLD GHB EST-MCNC: 286 MG/DL
GLOBULIN SER-MCNC: 4.9 G/DL (ref 2–4)
GLUCOSE SERPL-MCNC: 243 MG/DL (ref 74–100)
HBA1C MFR BLD HPLC: 11.6 %
HCT VFR BLD AUTO: 43.7 % (ref 40–54)
HCV AB SER QL: NORMAL
HDLC SERPL-MCNC: 39 MG/DL (ref 35–60)
HGB BLD-MCNC: 14.6 G/DL (ref 13.5–18)
IMM GRANULOCYTES # BLD AUTO: 0.03 K/UL (ref 0–0.04)
IMM GRANULOCYTES NFR BLD: 0.3 % (ref 0–0.4)
LDLC SERPL CALC-MCNC: 124 MG/DL
LDLC/HDLC SERPL: 3.2 {RATIO}
LYMPHOCYTES # BLD AUTO: 1.58 K/UL (ref 1–4.8)
LYMPHOCYTES NFR BLD AUTO: 17.2 % (ref 27–41)
MCH RBC QN AUTO: 31.7 PG (ref 27–31)
MCHC RBC AUTO-ENTMCNC: 33.4 G/DL (ref 32–36)
MCV RBC AUTO: 95 FL (ref 80–96)
MICROALBUMIN UR-MCNC: 60.5 MG/DL
MICROALBUMIN/CREAT RATIO PNL UR: 975.8 MG/G (ref 0–30)
MONOCYTES # BLD AUTO: 0.72 K/UL (ref 0–0.8)
MONOCYTES NFR BLD AUTO: 7.8 % (ref 2–6)
MPC BLD CALC-MCNC: 10.6 FL (ref 9.4–12.4)
NEUTROPHILS # BLD AUTO: 6.76 K/UL (ref 1.8–7.7)
NEUTROPHILS NFR BLD AUTO: 73.5 % (ref 53–65)
NONHDLC SERPL-MCNC: 150 MG/DL
NRBC # BLD AUTO: 0 X10E3/UL
NRBC, AUTO (.00): 0 %
PLATELET # BLD AUTO: 248 K/UL (ref 150–400)
POTASSIUM SERPL-SCNC: 4.2 MMOL/L (ref 3.5–5.1)
PROT SERPL-MCNC: 8.1 G/DL (ref 6.4–8.3)
RBC # BLD AUTO: 4.6 M/UL (ref 4.6–6.2)
SODIUM SERPL-SCNC: 135 MMOL/L (ref 136–145)
TRIGL SERPL-MCNC: 131 MG/DL (ref 34–140)
VLDLC SERPL-MCNC: 26 MG/DL
WBC # BLD AUTO: 9.2 K/UL (ref 4.5–11)

## 2025-07-02 PROCEDURE — 87070 CULTURE OTHR SPECIMN AEROBIC: CPT | Mod: ,,, | Performed by: CLINICAL MEDICAL LABORATORY

## 2025-07-02 PROCEDURE — 85025 COMPLETE CBC W/AUTO DIFF WBC: CPT | Mod: ,,, | Performed by: CLINICAL MEDICAL LABORATORY

## 2025-07-02 PROCEDURE — 82570 ASSAY OF URINE CREATININE: CPT | Mod: ,,, | Performed by: CLINICAL MEDICAL LABORATORY

## 2025-07-02 PROCEDURE — 83036 HEMOGLOBIN GLYCOSYLATED A1C: CPT | Mod: ,,, | Performed by: CLINICAL MEDICAL LABORATORY

## 2025-07-02 PROCEDURE — 80061 LIPID PANEL: CPT | Mod: ,,, | Performed by: CLINICAL MEDICAL LABORATORY

## 2025-07-02 PROCEDURE — 80053 COMPREHEN METABOLIC PANEL: CPT | Mod: ,,, | Performed by: CLINICAL MEDICAL LABORATORY

## 2025-07-02 PROCEDURE — 86803 HEPATITIS C AB TEST: CPT | Mod: ,,, | Performed by: CLINICAL MEDICAL LABORATORY

## 2025-07-02 PROCEDURE — 99214 OFFICE O/P EST MOD 30 MIN: CPT | Mod: 25,,,

## 2025-07-02 PROCEDURE — 87186 SC STD MICRODIL/AGAR DIL: CPT | Mod: ,,, | Performed by: CLINICAL MEDICAL LABORATORY

## 2025-07-02 PROCEDURE — 87077 CULTURE AEROBIC IDENTIFY: CPT | Mod: ,,, | Performed by: CLINICAL MEDICAL LABORATORY

## 2025-07-02 PROCEDURE — 82043 UR ALBUMIN QUANTITATIVE: CPT | Mod: ,,, | Performed by: CLINICAL MEDICAL LABORATORY

## 2025-07-02 PROCEDURE — 96372 THER/PROPH/DIAG INJ SC/IM: CPT | Mod: ,,,

## 2025-07-02 RX ORDER — AMLODIPINE BESYLATE 5 MG/1
5 TABLET ORAL DAILY
Qty: 30 TABLET | Refills: 0 | Status: CANCELLED | OUTPATIENT
Start: 2025-07-02 | End: 2025-08-01

## 2025-07-02 RX ORDER — METFORMIN HYDROCHLORIDE 500 MG/1
500 TABLET ORAL 2 TIMES DAILY WITH MEALS
Qty: 60 TABLET | Refills: 0 | Status: CANCELLED | OUTPATIENT
Start: 2025-07-02 | End: 2025-08-01

## 2025-07-02 RX ORDER — LOSARTAN POTASSIUM AND HYDROCHLOROTHIAZIDE 12.5; 5 MG/1; MG/1
1 TABLET ORAL DAILY
Qty: 30 TABLET | Refills: 0 | Status: SHIPPED | OUTPATIENT
Start: 2025-07-02 | End: 2025-08-01

## 2025-07-02 RX ORDER — IBUPROFEN 200 MG
1 TABLET ORAL 2 TIMES DAILY
Qty: 100 EACH | Refills: 11 | Status: SHIPPED | OUTPATIENT
Start: 2025-07-02

## 2025-07-02 RX ORDER — METFORMIN HYDROCHLORIDE 500 MG/1
500 TABLET ORAL 2 TIMES DAILY WITH MEALS
Qty: 60 TABLET | Refills: 0 | Status: SHIPPED | OUTPATIENT
Start: 2025-07-02 | End: 2025-08-01

## 2025-07-02 RX ORDER — ATORVASTATIN CALCIUM 40 MG/1
40 TABLET, FILM COATED ORAL DAILY
Qty: 30 TABLET | Refills: 0 | Status: SHIPPED | OUTPATIENT
Start: 2025-07-02 | End: 2025-08-01

## 2025-07-02 RX ORDER — AMLODIPINE BESYLATE 5 MG/1
5 TABLET ORAL DAILY
Qty: 30 TABLET | Refills: 0 | Status: SHIPPED | OUTPATIENT
Start: 2025-07-02 | End: 2025-08-01

## 2025-07-02 RX ORDER — ISOPROPYL ALCOHOL 70 ML/100ML
1 SWAB TOPICAL
Qty: 200 EACH | Refills: 11 | Status: SHIPPED | OUTPATIENT
Start: 2025-07-02

## 2025-07-02 RX ORDER — LOSARTAN POTASSIUM AND HYDROCHLOROTHIAZIDE 12.5; 5 MG/1; MG/1
1 TABLET ORAL DAILY
Qty: 30 TABLET | Refills: 0 | Status: CANCELLED | OUTPATIENT
Start: 2025-07-02 | End: 2025-08-01

## 2025-07-02 RX ORDER — CIPROFLOXACIN 500 MG/1
500 TABLET, FILM COATED ORAL EVERY 12 HOURS
Qty: 20 TABLET | Refills: 0 | Status: ON HOLD | OUTPATIENT
Start: 2025-07-02 | End: 2025-07-15 | Stop reason: HOSPADM

## 2025-07-02 RX ORDER — ATORVASTATIN CALCIUM 40 MG/1
40 TABLET, FILM COATED ORAL DAILY
Qty: 30 TABLET | Refills: 0 | Status: CANCELLED | OUTPATIENT
Start: 2025-07-02 | End: 2025-08-01

## 2025-07-02 NOTE — PROGRESS NOTES
Maddy     Mr. Pedro's case has been assigned to Kvng Coley @[phone]. Provider may review progress notes by typing pharmacy patient assistance in Epic search box.      What happens next? Assigned advocate will review your patients chart and research available options.  Patient and Provider may be asked to provide specific documentation to facilitate the PAP process. Failure to provide the requested documentation will delay assistance.    Please note: epic chart must reflect a current order for the requested medication written by an [facility] provider to begin PAP process.   Please note: all requests are subject to program availability and patient eligibility verification.   Please note: each program has it's own unique eligibility criteria (e.g., income limits, insurance status medical condition, residency).Therefore eligibility is determined by the specific program being applied to not by the Pharmacy Patient Assistance Team.         Thank you,   [facility]  [address] [facility] [id number]  [place] [address]  Fax: [phone]  Email: [email or web address]

## 2025-07-02 NOTE — TELEPHONE ENCOUNTER
----- Message from Rosalba Valiente sent at 7/2/2025  2:06 PM CDT -----  Regarding: Order for TERESA CHI,     Mr. Chi's case has been assigned to Kvng Coley @[phone]. Provider may review progress notes by typing pharmacy patient assistance in Epic search box.      What happens next? Assigned advocate will review your patients chart and research available options.  Patient and Provider may be asked to provide specific documentation to facilitate the PAP process. Failure to provide the requested documentation will delay assistance.    Please note: epic chart must reflect a current order for the requested medication written by an [facility] provider to begin PAP process.   Please note: all requests are subject to program availability and patient eligibility verification.   Please note: each program has it's own unique eligibility criteria (e.g., income limits, insurance status medical condition, residency).Therefore eligibility is determined by the specific program   being applied to not by the Pharmacy Patient Assistance Team.         Thank you,   [facility]  [address] [facility] [id number] [place] [id number]  Fax: [phone]  Email: [email or web address]      ----- Message -----  From: Kadi Farah FNP-C  Sent: 7/2/2025  11:01 AM CDT  To: Pharmacy Patient Assistance Team  Subject: Order for ARTITERESA

## 2025-07-02 NOTE — PATIENT INSTRUCTIONS
"Get a glucose meter and supplies that is affordable to be monitoring glucose 4 x per day and bring these readings to f/u visit   Controlling Your Blood Pressure Through Lifestyle   The Basics   Written by the doctors and editors at [facility]   What does my lifestyle have to do with my blood pressure? -- The things you do and the foods you eat have a big effect on your blood pressure and your overall health. Following the right lifestyle can:  Lower your blood pressure or keep you from getting high blood pressure in the first place  Reduce your need for blood pressure medicines  Make medicines for high blood pressure work better, if you do take them  Lower the chances that you'll have a heart attack or stroke, or develop kidney disease  Which lifestyle choices will help lower my blood pressure? -- Here's what you can do:  Lose weight (if you are overweight)  Choose a diet rich in fruits, vegetables, and low-fat dairy products, and low in meats, sweets, and refined grains  Eat less salt (sodium)  Do something active for at least 30 minutes a day on most days of the week  Limit the amount of alcohol you drink  If you have high blood pressure, it's also very important to quit smoking (if you smoke). Quitting smoking might not bring your blood pressure down. But it will lower the chances that you'll have a heart attack or stroke, and it will help you feel better and live longer.  Start low and go slow -- The changes listed above might sound like a lot, but don't worry. You don't have to change everything all at once. The key to improving your lifestyle is to "start low and go slow." Choose 1 small, specific thing to change and try doing it for a while. If it works for you, keep doing it until it becomes a habit. If it doesn't, don't give up. Choose something else to change and see how that goes.  Let's say, for example, that you would like to improve your diet. If you're the type of person who eats cheeseburgers and " "French fries all the time, you can't switch to eating just salads from one day to the next. When people try to make changes like that, they often fail. Then they feel frustrated and tend to give up. So instead of trying to change everything about your diet in 1 day, change 1 or 2 small things about your diet and give yourself time to get used to those changes. For instance, keep the cheeseburger but give up the French fries. Or eat the same things but cut your portions in half.  As you find things that you are able to change and stick with, keep adding new changes. In time, you will see that you can actually change a lot. You just have to get used to the changes slowly.  Lose weight -- When people think about losing weight, they sometimes make it more complicated than it really is. To lose weight, you have to either eat less or move more. If you do both of those things, it's even better. But there is no single weight-loss diet or activity that's better than any other. When it comes to weight loss, the most effective plan is the one that you'll stick with.  Improve your diet -- There is no single diet that is right for everyone. But in general, a healthy diet can include:  Lots of fruits, vegetables, and whole grains  Some beans, peas, lentils, chickpeas, and similar foods  Some nuts, such as walnuts, almonds, and peanuts  Fat-free or low-fat milk and milk products  Some fish  To have a healthy diet, it's also important to limit or avoid sugar, sweets, meats, and refined grains. (Refined grains are found in white bread, white rice, most forms of pasta, and most packaged "snack" foods.)  Reduce salt -- Many people think that eating a low-sodium diet means avoiding the salt shaker and not adding salt when cooking. The truth is, not adding salt at the table or when you cook will only help a little. Almost all of the sodium you eat is already in the food you buy at the grocery store or at restaurants (figure 1).  The " "most important thing you can do to cut down on sodium is to eat less processed food. That means that you should avoid most foods that are sold in cans, boxes, jars, and bags. You should also eat in restaurants less often.  To reduce the amount of sodium you get, buy fresh or fresh-frozen fruits, vegetables, and meats. (Fresh-frozen foods have had nothing added to them before freezing.) Then you can make meals at home, from scratch, with these ingredients.  As with the other changes, don't try to cut out salt all at once. Instead, choose 1 or 2 foods that have a lot of sodium and try to replace them with low-sodium choices. When you get used to those low-sodium options, find another food or 2 to change. Then keep going, until all the foods you eat are sodium-free or low in sodium.  Become more active -- If you want to be more active, you don't have to go to the gym or get all sweaty. It is possible to increase your activity level while doing everyday things you enjoy. Walking, gardening, and dancing are just a few of the things that you might try. As with all the other changes, the key is not to do too much too fast. If you don't do any activity now, start by walking for just a few minutes every other day. Do that for a few weeks. If you stick with it, try doing it for longer. But if you find that you don't like walking, try a different activity.  Drink less alcohol -- If you are a woman, do not have more than 1 "standard drink" of alcohol a day. If you are a man, do not have more than 2. A "standard drink" is:  A can or bottle that has 12 ounces of beer  A glass that has 5 ounces of wine  A shot that has 1.5 ounces of whiskey  Where should I start? -- If you want to improve your lifestyle, start by making the changes that you think would be easiest for you. If you used to exercise and just got out of the habit, maybe it would be easy for you to start exercising again. Or if you actually like cooking meals from " "scratch, maybe the first thing you should focus on is eating home-cooked meals that are low in sodium.  Whatever you tackle first, choose specific, realistic goals, and give yourself a deadline. For example, do not decide that you are going to "exercise more." Instead, decide that you are going to walk for 10 minutes on Monday, Wednesday, and Friday, and that you are going to do this for the next 2 weeks.  When lifestyle changes are too general, people have a hard time following through.  Now go. You can do it!  All topics are updated as new evidence becomes available and our peer review process is complete.  This topic retrieved from [employer] on: Sep 21, 2021.  Topic 85344 Version 8.0  Release: 29.4.2 - C29.263  © 2021 UpToDate, Inc. and/or its affiliates. All rights reserved.  figure 1: Sources of sodium in your diet     Graphic 80523 Version 2.0    Consumer Information Use and Disclaimer   This information is not specific medical advice and does not replace information you receive from your health care provider. This is only a brief summary of general information. It does NOT include all information about conditions, illnesses, injuries, tests, procedures, treatments, therapies, discharge instructions or life-style choices that may apply to you. You must talk with your health care provider for complete information about your health and treatment options. This information should not be used to decide whether or not to accept your health care provider's advice, instructions or recommendations. Only your health care provider has the knowledge and training to provide advice that is right for you. The use of this information is governed by the [employer] End User License Agreement, available at https://www.[employer].NeuMedics/en/solutions/CoverHound/about/woodrow.The use of [employer] content is governed by the [employer] Terms of Use. ©2021 UpToDate, Inc. All rights reserved.  Copyright   © 2021 UpToDate, Inc. and/or its " affiliates. All rights reserved.      DASH Diet   About this topic   DASH stands for Dietary Approaches to Stop Hypertension. The DASH diet may help you lower blood pressure. It may also help keep you from getting high blood pressure. You will eat less fat and more fiber on the DASH diet.  This diet gives you more minerals that fight high blood pressure. Some nutrients in this diet are:  Potassium ? Acts to help you get rid of salt. This may help to lower blood pressure.  Calcium ? Makes blood vessels and muscles work the right way  Magnesium - Helps blood vessels relax  Fiber ? Helps you feel full. It also helps digestion.  What will the results be?   The DASH diet may help you:  Lower your blood pressure and cholesterol  Lower your risk for cancer, heart disease, heart attack, and stroke. It may also lower your risk for heart failure, kidney stones, and diabetes.  Lose weight or keep a healthy weight  What lifestyle changes are needed?   Add regular exercise to get the most help from this diet.  Try to lower stress. Find ways to relax.  Stop smoking. Avoid secondhand smoke.  Limit alcohol intake.  What changes to diet are needed?   Know about poor eating habits. Then, you can fix them as you work with the program.  This diet encourages fruits and vegetables, whole grains, lean meats, healthy fats, and low-fat or fat-free dairy products.  This diet is lower in saturated fats, trans-fats, cholesterol, added sugars, and sodium.  Who should use this diet?   This eating plan is good for the whole family. It is also good for people with high blood pressure and those at risk for high blood pressure.  What foods are good to eat?   Grains: Try to eat 6 to 8 servings of whole grain, high fiber foods each day. These are bread, cereals, brown rice, or pasta.  Fruits and vegetables: Eat 4 to 5 servings each day. Try to pick many kinds and colors. Fresh or frozen are best. Look for low sodium or salt-free if you choose  canned.  Dairy: Try to eat 2 to 3 servings of fat free and low fat milk products each day.  Lean meats, poultry, and seafood: Try to eat 6 servings or less of lean meats, poultry, and seafood each day. Try to choose more low fat or lean meats like chicken and turkey. Eat less red meat. Eat more fish instead.  Nuts, seeds, and legumes (dry beans and peas): Try to eat 4 to 5 servings each week. Try to pick nuts such as almonds and walnuts, sunflower seeds, peanut butter, soy beans, lentils, kidney beans, and split peas.  Fats and oils: Try to eat 2 to 3 servings of fats and oils each day. Eat good fats found in fish, nuts, and avocados. Try using olive oil or vegetable oils such as canola oil. Other good oils to try are corn, safflower, sunflower, or soybean oils. Use low-sodium and low-fat salad dressing and mayonnaise.  Condiments: Pepper, herbs, spices, vinegar, lemon or lime juices are great for seasoning. Be careful to choose low-sodium or salt-free products if you use broths, soups, or soy sauce.  Sweets: Try to eat less than 5 servings each week. Choose low-fat and trans fat-free desserts. These are things like fruit flavored gelatin, sorbet, jelly beans, fannie crackers, animal crackers, low-fat fig bars, and jay snaps. Eat fruit to satisfy your desire for sweets.     What foods should be limited or avoided?   Grains: Salted breads, rolls, crackers, quick breads, self-rising flours, biscuit mixes, regular bread crumbs, instant hot cereals, commercially-prepared rice, pasta, stuffing mixes  Fruits and vegetables: Commercially-prepared potatoes and vegetable mixes, regular canned vegetables and juices, vegetables frozen with sauce or pickled vegetables, processed fruits with salt or sodium  Milk: Whole milk, malted milk, chocolate milk, buttermilk, cheese, ice cream  Meats and beans: Smoked, cured, salted, or canned fish; meats or poultry such as fernandez, sausages, sardines; high-fat cuts of meat like beef,  lamb, or pork; chicken with the skin on it  Fats: Cut back on solid fats like butter, lard, and margarine. Eat less food with high saturated fat, cholesterol and total fat.  Condiments and snacks: Salted and canned peas, beans, and olives; salted snack foods; fried foods; soda or other sweetened drinks  Sweets: High-fat baked goods such as muffins, donuts, pastries, commercial baked goods, candy bars  If you choose to drink alcohol, limit the amount you drink. Women should have 1 drink or less per day and men should have 2 drinks or less per day.  Helpful tips   Avoid eating canned vegetables and processed foods. These have a lot of salt in them. Look for a low-salt or low-sodium choice.  Try baking or broiling instead of frying food.  Write down the foods you eat. This will help you track what you have eaten each week.  When you go to a grocery store, have a list or a meal plan. Do not shop when you are hungry to avoid cravings for foods.  Read food labels with care. They will show you how much is in a serving. The amount is given as a percentage of the total amount you need each day. Reading labels will help you make healthy food choices.       Avoid fast foods.  Talk to your doctor or dietitian to see if you need vitamin and mineral supplements to help you balance your diet.  Talk to a dietitian for help.  Where can I learn more?   Academy of Nutrition and Dietetics  https://www.eatright.org/health/wellness/heart-and-cardiovascular-health/dash-diet-reducing-hypertension-through-diet-and-lifestyle   FamilyDoctor.org  http://familydoctor.org/familydoctor/en/prevention-wellness/food-nutrition/weight-loss/the-dash-diet-healthy-eating-to-control-your-blood-pressure.html   Last Reviewed Date   2021-03-15  Consumer Information Use and Disclaimer   This information is not specific medical advice and does not replace information you receive from your health care provider. This is only a brief summary of general  information. It does NOT include all information about conditions, illnesses, injuries, tests, procedures, treatments, therapies, discharge instructions or life-style choices that may apply to you. You must talk with your health care provider for complete information about your health and treatment options. This information should not be used to decide whether or not to accept your health care providers advice, instructions or recommendations. Only your health care provider has the knowledge and training to provide advice that is right for you.  Copyright   Copyright © 2021 UpToDate, Inc. and its affiliates and/or licensors. All rights reserved.    Why Water Is Important to Health   About this topic   Your body needs a certain amount of water to work the right way. Your body takes in water from the fluids you drink and the food you eat. This helps your body get rid of waste products. Water also helps keep your temperature normal, helps with digestion, protects your spinal cord, and lubricates your joints.  If you dont have enough water, doctors say you are dehydrated. You must take in enough water each day to replace what your body loses when you:  Sweat  Pass urine  Have a bowel movement  Breathe  You may lose even more water than normal if you are sick or hurt with something like:  A fever  A burn  An illness where you throw up or have loose stools  Some medicines can also make you lose more water than normal.  General   Most people have heard they need 6 to 8 glasses of water each day. This suggestion is not backed by science. Different people need to drink different amounts of water. How much you need to drink is based on things like your age, body, health, weather, and how active you are. It is even possible to drink too much water in some cases. Being thirsty is your bodys way of telling you it needs fluids.  Good ways to make sure you take in enough fluids include:  Drink healthy fluids when you are  thirsty. These are things like water, low-fat milk, plain or flavored seltzer, or unsweetened tea or coffee.  Eat foods that have a higher water content. Fruits and vegetables like strawberries, watermelon, tomatoes, and celery all have high water content.  Drink water before, during, and after a workout. Only drink sports drinks if you plan to exercise at an intense rate for more than an hour. Sports drinks have carbohydrates and electrolytes that can help with recovery.  Drink water with meals.  There are some kinds of drinks that are not healthy and should be limited or avoided. Try NOT to drink:  Sugary drinks like soda, sports drinks, or sweetened tea or coffee. These can add calories to your diet and lead to tooth decay.  Energy drinks. These can have a lot of caffeine and sugar in them.  Juice drinks. These often have limited amounts of juice and a lot of sugar in them.  Too much alcohol or caffeine. Both of these can cause dehydration.  Tips to increase your fluid intake:  Keep a bottle of water with you. This can encourage you to drink more.  Add fruits or vegetables to plain water to change the taste. Add berries, raymond, or cucumbers to your water to flavor it.  When you are hungry, you might actually be thirsty. True hunger will not go away by drinking water. Water can help you manage your weight.  If you have trouble remembering to drink water, try drinking water on a schedule. Set specific times for when you will drink water.  Choose water when eating at restaurants.  Where can I learn more?   American Association of Family Physicians  https://familydoctor.org/hydration-why-its-so-important/   Better Health Channel  https://www.betterhealth.anupam.gov.au/health/HealthyLiving/water-a-vital-nutrient   Centers for Disease Control and Prevention  https://www.cdc.gov/healthywater/drinking/nutrition/index.html   Kids Health  https://kidshealth.org/en/kids/water.html   Last Reviewed Date   2021-09-09  Consumer  Information Use and Disclaimer   This information is not specific medical advice and does not replace information you receive from your health care provider. This is only a brief summary of general information. It does NOT include all information about conditions, illnesses, injuries, tests, procedures, treatments, therapies, discharge instructions or life-style choices that may apply to you. You must talk with your health care provider for complete information about your health and treatment options. This information should not be used to decide whether or not to accept your health care providers advice, instructions or recommendations. Only your health care provider has the knowledge and training to provide advice that is right for you.  Copyright   Copyright © 2021 UpToDate, Inc. and its affiliates and/or licensors. All rights reserved.     Type 2 Diabetes   The Basics   Written by the doctors and editors at [facility]   What is type 2 diabetes? -- Type 2 diabetes is a disorder that disrupts the way your body uses sugar. It is sometimes called type 2 diabetes mellitus.  All the cells in your body need sugar to work normally. Sugar gets into the cells with the help of a hormone called insulin. Insulin is made by the pancreas, an organ in the belly. If there is not enough insulin, or if your body stops responding to insulin, sugar builds up in the blood. That is what happens to people with diabetes.  There are two different types of diabetes:   Type 1 diabetes - In type 1 diabetes, the pancreas does not make insulin or makes very little insulin.  Type 2 diabetes - In most people with type 2 diabetes, the body stops responding to insulin normally. Then, over time, the pancreas stops making enough insulin.   Being overweight or obese increases a person's risk of developing type 2 diabetes. But people who are not overweight can get diabetes, too.  What are the symptoms of type 2 diabetes? -- Type 2 diabetes usually  "causes no symptoms. When symptoms do occur, they include:  Needing to urinate often  Intense thirst  Blurry vision  Can diabetes lead to other health problems? -- Yes. Type 2 diabetes might not make you feel sick. But if it is not managed, it can lead to serious problems over time, such as:  Heart attacks  Strokes  Kidney disease  Vision problems (or even blindness)  Pain or loss of feeling in the hands and feet  Needing to have fingers, toes, or other body parts removed (amputated)  How do I know if I have type 2 diabetes? -- To find out if you have type 2 diabetes, your doctor or nurse can do a blood test. There are 2 tests that can be used for this. Both involve measuring the amount of sugar in your blood, called your "blood sugar" or "blood glucose":   One of the tests measures your blood sugar at the time the blood sample is taken. This test is done in the morning. You can't eat or drink anything except water for at least 8 hours before the test.   The other test shows what your average blood sugar has been for the past 2 to 3 months. This blood test is called "hemoglobin A1C" or just "A1C." It can be checked at any time of the day, even if you have recently eaten.  How is type 2 diabetes treated? -- The goals of treatment are to control your blood sugar and lower the risk of future problems that can happen in people with diabetes. An important part of managing diabetes is to eat healthy foods and get plenty of physical activity.  There are a few medicines that help control blood sugar. Some people need to take pills that help the body make more insulin or that help insulin do its job. Others need insulin shots.  Depending on what medicines you take, you might need to check your blood sugar regularly at home. But not everyone with type 2 diabetes needs to do this. Your doctor or nurse will tell you if you should be checking your blood sugar, and when and how to do this.  Sometimes, people with type 2 diabetes " also need medicines to reduce the problems caused by the disease. For instance, medicines used to lower blood pressure can reduce the chances of a heart attack or stroke.  It's also important to get certain vaccines, such as vaccines to protect against the flu and coronavirus disease 2019 (COVID-19). Some people also need a vaccine to prevent pneumonia.  Can type 2 diabetes be prevented? -- Yes. To lower your chances of getting type 2 diabetes, the most important thing you can do is eat a healthy diet and get plenty of physical activity. This can help you lose weight if you are overweight. But eating well and being active are also good for your overall health. Even gentle activity, like walking, has benefits.  If you smoke, quitting can also lower your risk of type 2 diabetes. Quitting smoking can be hard to do, but your doctor or nurse can help.  All topics are updated as new evidence becomes available and our peer review process is complete.  This topic retrieved from [employer] on: Sep 21, 2021.  Topic 43593 Version 14.0  Release: 29.4.2 - C29.263  © 2021 UpToDate, Inc. and/or its affiliates. All rights reserved.  Consumer Information Use and Disclaimer   This information is not specific medical advice and does not replace information you receive from your health care provider. This is only a brief summary of general information. It does NOT include all information about conditions, illnesses, injuries, tests, procedures, treatments, therapies, discharge instructions or life-style choices that may apply to you. You must talk with your health care provider for complete information about your health and treatment options. This information should not be used to decide whether or not to accept your health care provider's advice, instructions or recommendations. Only your health care provider has the knowledge and training to provide advice that is right for you. The use of this information is governed by the [employer]  End User License Agreement, available at https://www.opendorse.[employer] miqi.cn/en/solutions/lexicomp/about/woodrow.The use of [employer] content is governed by the [employer] Terms of Use. ©2021 UpToDate, Inc. All rights reserved.  Copyright   © 2021 UpToDate, Inc. and/or its affiliates. All rights reserved.    Foot Care for Diabetics   About this topic   Diabetes is an illness that makes your blood sugar too high. If your blood sugar is not controlled, you may have problems with how well your nerves work. High blood sugars can damage the small blood vessels that carry food and oxygen to these nerves. Nerve damage in diabetics is called diabetic neuropathy.  Diabetic neuropathy can cause problems with your legs and feet. The nerves in your feet carry information to the brain about pain and the sense of touch, such as something being too hot or too cold. If you have problems with these nerves, you may not be able to feel when you have a blister or cut on your foot. You also may not feel if you step on an object that causes injury to your foot. A small sore may lead to a bigger problem because it is not treated right away. This is why it is important to take good care of your feet.  General   Take care of your feet.  Wash your feet with warm water and soap each day and pat them dry. Dry skin between toes.  Check your feet each day. Look for cuts, blisters, redness, or swelling. Use a mirror or ask someone to help you check the bottom of your feet. Be sure to check the:  Tip of your big toe  Place where your toe and the bottom of your foot meet  Heel  Outside edge of your foot  Ball of your foot  Keep feet moisturized. Put lotion on the tops and bottoms of your feet, but not between your toes.  Trim your toenails straight across when needed. Do not cut the corners of your toenails. File rough edges. Ask for help if you cannot see well or have problems reaching your feet.  See your doctor if you need to have a corn or callus taken  off. Do not attempt to remove corns and calluses yourself by cutting them or using chemicals.  Never soak your feet.  Ask your diabetes doctor to check your feet. Ask if you need the help of a foot specialist.  Take your shoes and socks off at every visit  Protect your feet from injury.  Wear shoes and socks at all times, even in the house. Do not walk barefoot. Always wear shoes at the beach and around the swimming pool.  Wear shoes that fit the right way and are not too tight or too loose. Check them each time before you put them on to make sure the lining is smooth. Also check to make sure there is nothing inside of the shoes before putting them on. Ask your doctor for a prescription for special shoes. Check to see if they are covered by your insurance.  Do not wear shoes that expose any part of the foot, like sandals, thongs, clogs.  Wear socks made of cotton. Be sure your socks are not too tight. Do not wear shoes without socks.  Protect your feet from hot and cold. Test bath water before putting your feet in it to make sure it is not too hot. Take extra care when going outside in the cold.  If your feet are cold, wear warm socks. Do not use heating pads, any kind of heater, or soak your feet to get them warm.  Exercise may help your blood flow.  Prop your feet up on a stool when sitting. Be sure to move your ankles and toes often to help with blood flow. You can wear a support stocking to help with swelling.  Regular walking helps blood flow.     What problems could happen?   Damage to the feet because of loss of feeling  Skin and soft tissue injury that may lead to amputation  Serious infection  Damage to foot joints or arch  What can be done to prevent this health problem?   Take care of your blood sugar.  Check your blood sugar each day. Know your blood sugar goals. Keep a record of your results.  Note how you feel when your blood sugar is high versus when your blood sugar is within normal limits.  Control  your blood sugar with smaller portions of healthy food and by taking your antidiabetic drugs.  Stop smoking.  Smoking causes poor blood flow, which damages the nerves.  Ask your doctor for help quitting.  Limit alcohol use.  Take care of your blood pressure and blood cholesterol.  When do I need to call the doctor?   Signs of infection. These include a fever of 100.4°F (38°C) or higher, chills, or a wound that will not heal.  New sores or signs of wound infection. These include swelling, redness, warmth around the wound; too much pain when touched; yellowish, greenish, or bloody discharge; foul smell coming from the wound.  Sores or blisters that do not hurt as much as you would expect  Numbness or tingling on the foot or legs  Corns, calluses, blisters, or sores on your foot  Excessive skin dryness, scaling, and cracking. This can be a sign of decreased blood flow to your feet.  Changes in the way your foot joints or arch look  Blood sugar is lower or higher than normal  Teach Back: Helping You Understand   The Teach Back Method helps you understand the information we are giving you. After you talk with the staff, tell them in your own words what you learned. This helps to make sure the staff has described each thing clearly. It also helps to explain things that may have been confusing. Before going home, make sure you can do these:  I can tell you about my condition.  I can tell you how to care for my feet.  I can tell you what I will do if I have new sores or signs of infection.  Where can I learn more?   Centers for Disease Control and Prevention  https://www.cdc.gov/features/diabetesfoothealth/index.html   Last Reviewed Date   2021-03-30  Consumer Information Use and Disclaimer   This information is not specific medical advice and does not replace information you receive from your health care provider. This is only a brief summary of general information. It does NOT include all information about conditions,  "illnesses, injuries, tests, procedures, treatments, therapies, discharge instructions or life-style choices that may apply to you. You must talk with your health care provider for complete information about your health and treatment options. This information should not be used to decide whether or not to accept your health care providers advice, instructions or recommendations. Only your health care provider has the knowledge and training to provide advice that is right for you.  Copyright   Copyright © 2021 UpToDate, Inc. and its affiliates and/or licensors. All rights reserved.    Diabetes and Diet   The Basics   Written by the doctors and editors at [employer]   Why is diet important in diabetes? -- Diet is important because it is part of diabetes treatment. Many people need to change what they eat and how much they eat to help treat their diabetes. It is important for people to treat their diabetes so that they:  Keep their blood sugar at or near a normal level  Prevent long-term problems, such as heart or kidney problems, that can happen in people with diabetes  Changing your diet can also help treat obesity, high blood pressure, and high cholesterol. These conditions can affect people with diabetes and can lead to future problems, such as heart attacks or strokes.  Who will work with me to change my diet? -- Your doctor or nurse will work with you to make a food plan to change your diet. They might also recommend that you work with a "dietitian." A dietitian is an expert on food and eating.  Do I need to eat at the same times every day? -- When and how often you should eat depends, in part, on the diabetes medicines you take. For example:  People who take about the same amount of insulin at the same time each day (called a "fixed regimen") should eat meals at the same times. This is also true for people who take pills that increase insulin levels, such as sulfonylureas. Eating meals at the same time every day " "helps prevent low blood sugar.  People who adjust the dose and timing of their insulin each day (called a "flexible regimen") do not always have to eat meals at the same time. That's because they can time their insulin dose for before they plan to eat, and also adjust the dose for how much they plan to eat.  People who take medicines that don't usually cause low blood sugar, such as metformin, don't have to eat meals at the same time every day.  What do I need to think about when planning what to eat? -- Our bodies break down the food we eat into small pieces called carbohydrates, proteins, and fats.  When planning what to eat, people with diabetes need to think about:  Carbohydrates (or "carbs") - Carbohydrates, which are sugars that our bodies use for energy, can raise a person's blood sugar level. Your doctor, nurse, or dietitian will tell you how many carbohydrates you should eat at each meal or snack. Foods that have carbohydrates include:  Bread, pasta, and rice  Vegetables and fruits  Dairy foods  Foods and drinks with added sugar  It is best to get your carbohydrates from fruits, vegetables, whole grains, and low-fat milk. It is best to avoid drinks with added sugar, like soda, juices, and sports drinks.   Protein - Your doctor, nurse, or dietitian will tell you how much protein you should eat each day. It is best to eat lean meats, fish, eggs, beans, peas, soy products, nuts, and seeds.  Fats - The type of fat you eat is more important than the amount of fat. "Saturated" and "trans" fats can increase your risk for heart problems, like a heart attack.  Foods that have saturated fats include meat, butter, cheese, and ice cream.  Foods that have trans fats include processed food with "partially hydrogenated oils" on the ingredient list. This may include fried foods, store bought cookies, muffins, pies, and cakes.  "Monounsaturated" and "polyunsaturated" fats are better for you. Foods with these types of fat " include fish, avocado, olive oil, and nuts.  Calories - People need to eat a certain amount of calories each day to keep their weight the same. People who are overweight and want to lose weight need to eat fewer calories each day.  Fiber - Eating foods with a lot of fiber can help control a person's blood sugar level. Foods that have a lot of fiber include apples, green beans, peas, beans, lentils, nuts, oatmeal, and whole grains.  Salt - People who have high blood pressure should not eat foods that contain a lot of salt (also called sodium). People with high blood pressure should also eat healthy foods, such as fruits, vegetables, and low-fat dairy foods.  Alcohol - Having more than 1 drink (for women) or 2 drinks (for men) a day can raise blood sugar levels. Also, drinks that have fruit juice or soda in them can raise blood sugar levels.  What can I do if I need to lose weight? -- If you need to lose weight, you can:  Exercise - Try to get at least 30 minutes of physical activity a day, most days of the week. Even gentle exercise, like walking, is good for your health. Some people with diabetes need to change their medicine dose before they exercise. They might also need to check their blood sugar levels before and after exercising.  Eat fewer calories - Your doctor, nurse, or dietitian can tell you how many calories you should eat each day in order to lose weight.  If you are worried about your weight, size, or shape, talk with your doctor, nurse, or dietitian. They can help you make changes to improve your health.  Can I eat the same foods as my family? -- Yes. You do not need to eat special foods if you have diabetes. You and your family can eat the same foods. Changing your diet is mostly about eating healthy foods and not eating too much.  What are the other parts of diabetes treatment? -- Besides changing your diet, the other parts of diabetes treatment are:  Exercise  Medicines  Some people with diabetes  need to learn how to match their diet and exercise with their medicine dose. For example, people who use insulin might need to choose the dose of insulin they give themselves. To choose their dose, they need to think about:  What they plan to eat at the next meal  How much exercise they plan to do  What their blood sugar level is  If the diet and exercise do not match the medicine dose, a person's blood sugar level can get too low or too high. Blood sugar levels that are too low or too high can cause problems.  All topics are updated as new evidence becomes available and our peer review process is complete.  This topic retrieved from [employer] on: Sep 21, 2021.  Topic 81823 Version 7.0  Release: 29.4.2 - C29.263  © 2021 UpToDate, Inc. and/or its affiliates. All rights reserved.  Consumer Information Use and Disclaimer   This information is not specific medical advice and does not replace information you receive from your health care provider. This is only a brief summary of general information. It does NOT include all information about conditions, illnesses, injuries, tests, procedures, treatments, therapies, discharge instructions or life-style choices that may apply to you. You must talk with your health care provider for complete information about your health and treatment options. This information should not be used to decide whether or not to accept your health care provider's advice, instructions or recommendations. Only your health care provider has the knowledge and training to provide advice that is right for you. The use of this information is governed by the [employer] End User License Agreement, available at [email or web address]/[email or web address]/[email or web address].The use of [employer] content is governed by the [employer] Terms of Use. ©2021 UpToDate, Inc. All rights reserved.  Copyright   © 2021 UpToDate, Inc. and/or its affiliates. All rights reserved.

## 2025-07-02 NOTE — LETTER
July 2, 2025    Wallace Pedro  [facility] [address]  [place] [id number]             Dear Mr. Pedro,    My name is Kvng Coley, and I am reaching out on behalf of [facility]s Pharmacy Patient Assistance Team regarding your request for medication assistance. Our goal is to help qualified [facility] patients obtain financial assistance for prescribed medications.    Please note that enrollment into available support may require the following documents:    Completed Medication Access [facility] authorization forms, Copy of all insurance cards (front and back), Copy of the first 2 pages of your most recent tax return (eg, 1040) , and Proof of household income (such as social security award letter, pension statement or 3 consecutive pay stubs)    If you still need assistance with your medications, please reach out to the phone number listed below. If we do not hear back from you, a second contact attempt will be made via mail or your [facility] portal in 5 to 10 business days.    Thank you for giving us the opportunity to assist you with your healthcare needs. We look forward to working with you.      Sincerely  Kvng Coley @[phone]  Pharmacy Patient Assistance Team  [address] [place] [address]  [address] [place] [id number]  Fax: [phone]  Email: [email or web address]

## 2025-07-02 NOTE — TELEPHONE ENCOUNTER
First contact attempt has been made via phone, letter, and portal message  . Welcome letter and [facility] Enrollment Packet has been sent via letter and portal message . Follow-up will be made in approximately 5 to 10 business days.      Kvng Coley  Pharmacy Patient Assistance Team

## 2025-07-04 ENCOUNTER — RESULTS FOLLOW-UP (OUTPATIENT)
Dept: FAMILY MEDICINE | Facility: CLINIC | Age: 50
End: 2025-07-04

## 2025-07-05 LAB
MICROORGANISM SPEC CULT: ABNORMAL
MICROORGANISM SPEC CULT: ABNORMAL

## 2025-07-12 ENCOUNTER — HOSPITAL ENCOUNTER (INPATIENT)
Facility: HOSPITAL | Age: 50
LOS: 3 days | Discharge: HOME | DRG: 854 | End: 2025-07-15
Attending: EMERGENCY MEDICINE

## 2025-07-12 DIAGNOSIS — A41.9 SEPSIS, DUE TO UNSPECIFIED ORGANISM, UNSPECIFIED WHETHER ACUTE ORGAN DYSFUNCTION PRESENT: Primary | ICD-10-CM

## 2025-07-12 DIAGNOSIS — Z13.6 SCREENING FOR CARDIOVASCULAR CONDITION: ICD-10-CM

## 2025-07-12 DIAGNOSIS — E11.65 UNCONTROLLED TYPE 2 DIABETES MELLITUS WITH HYPERGLYCEMIA: ICD-10-CM

## 2025-07-12 DIAGNOSIS — I96 GANGRENE OF RIGHT FOOT: ICD-10-CM

## 2025-07-12 DIAGNOSIS — L03.119 CELLULITIS OF MULTIPLE SITES OF LOWER EXTREMITY: ICD-10-CM

## 2025-07-12 DIAGNOSIS — L97.513: ICD-10-CM

## 2025-07-12 LAB
ALBUMIN SERPL BCP-MCNC: 2.5 G/DL (ref 3.5–5)
ALBUMIN/GLOB SERPL: 0.5 {RATIO}
ALP SERPL-CCNC: 107 U/L (ref 40–150)
ALT SERPL W P-5'-P-CCNC: 9 U/L
ANION GAP SERPL CALCULATED.3IONS-SCNC: 15 MMOL/L (ref 7–16)
AST SERPL W P-5'-P-CCNC: 20 U/L (ref 11–45)
BASOPHILS # BLD AUTO: 0.04 K/UL (ref 0–0.2)
BASOPHILS NFR BLD AUTO: 0.3 % (ref 0–1)
BILIRUB SERPL-MCNC: 0.6 MG/DL
BUN SERPL-MCNC: 11 MG/DL (ref 9–21)
BUN/CREAT SERPL: 14 (ref 6–20)
CALCIUM SERPL-MCNC: 8.6 MG/DL (ref 8.4–10.2)
CHLORIDE SERPL-SCNC: 98 MMOL/L (ref 98–107)
CO2 SERPL-SCNC: 21 MMOL/L (ref 22–29)
CREAT SERPL-MCNC: 0.81 MG/DL (ref 0.72–1.25)
DIFFERENTIAL METHOD BLD: ABNORMAL
EGFR (NO RACE VARIABLE) (RUSH/TITUS): 109 ML/MIN/1.73M2
EOSINOPHIL # BLD AUTO: 0.02 K/UL (ref 0–0.5)
EOSINOPHIL NFR BLD AUTO: 0.1 % (ref 1–4)
ERYTHROCYTE [DISTWIDTH] IN BLOOD BY AUTOMATED COUNT: 11.9 % (ref 11.5–14.5)
GLOBULIN SER-MCNC: 4.7 G/DL (ref 2–4)
GLUCOSE SERPL-MCNC: 293 MG/DL (ref 74–100)
GLUCOSE SERPL-MCNC: 296 MG/DL (ref 70–105)
HCO3 UR-SCNC: 24.6 MMOL/L (ref 24–28)
HCT VFR BLD AUTO: 39.3 % (ref 40–54)
HCT VFR BLD CALC: 40 % (ref 35–51)
HGB BLD-MCNC: 13.2 G/DL (ref 13.5–18)
IMM GRANULOCYTES # BLD AUTO: 0.07 K/UL (ref 0–0.04)
IMM GRANULOCYTES NFR BLD: 0.5 % (ref 0–0.4)
LACTATE SERPL-SCNC: 1.1 MMOL/L (ref 0.5–2.2)
LDH SERPL L TO P-CCNC: 1 MMOL/L (ref 0.3–1.2)
LYMPHOCYTES # BLD AUTO: 1.12 K/UL (ref 1–4.8)
LYMPHOCYTES NFR BLD AUTO: 7.9 % (ref 27–41)
MAGNESIUM SERPL-MCNC: 1.6 MG/DL (ref 1.6–2.6)
MCH RBC QN AUTO: 32 PG (ref 27–31)
MCHC RBC AUTO-ENTMCNC: 33.6 G/DL (ref 32–36)
MCV RBC AUTO: 95.2 FL (ref 80–96)
MONOCYTES # BLD AUTO: 1.48 K/UL (ref 0–0.8)
MONOCYTES NFR BLD AUTO: 10.5 % (ref 2–6)
MPC BLD CALC-MCNC: 10 FL (ref 9.4–12.4)
NEUTROPHILS # BLD AUTO: 11.36 K/UL (ref 1.8–7.7)
NEUTROPHILS NFR BLD AUTO: 80.7 % (ref 53–65)
NRBC # BLD AUTO: 0 X10E3/UL
NRBC, AUTO (.00): 0 %
PCO2 BLDA: 37 MMHG (ref 41–51)
PH SMN: 7.43 [PH] (ref 7.32–7.42)
PLATELET # BLD AUTO: 230 K/UL (ref 150–400)
PO2 BLDA: 49 MMHG (ref 25–40)
POC BASE EXCESS: 0.5 MMOL/L (ref -2–3)
POC CO2: 25.7 MMOL/L
POC IONIZED CALCIUM: 1.07 MMOL/L (ref 1.15–1.35)
POC SATURATED O2: 85 % (ref 40–70)
POCT GLUCOSE: 294 MG/DL (ref 60–95)
POTASSIUM BLD-SCNC: 3.6 MMOL/L (ref 3.4–4.5)
POTASSIUM SERPL-SCNC: 3.8 MMOL/L (ref 3.5–5.1)
PROT SERPL-MCNC: 7.2 G/DL (ref 6.4–8.3)
RBC # BLD AUTO: 4.13 M/UL (ref 4.6–6.2)
SODIUM BLD-SCNC: 127 MMOL/L (ref 136–145)
SODIUM SERPL-SCNC: 130 MMOL/L (ref 136–145)
WBC # BLD AUTO: 14.09 K/UL (ref 4.5–11)

## 2025-07-12 PROCEDURE — 99900035 HC TECH TIME PER 15 MIN (STAT)

## 2025-07-12 PROCEDURE — 84295 ASSAY OF SERUM SODIUM: CPT

## 2025-07-12 PROCEDURE — 99223 1ST HOSP IP/OBS HIGH 75: CPT | Mod: ,,,

## 2025-07-12 PROCEDURE — 85025 COMPLETE CBC W/AUTO DIFF WBC: CPT | Performed by: EMERGENCY MEDICINE

## 2025-07-12 PROCEDURE — 63600175 PHARM REV CODE 636 W HCPCS: Performed by: EMERGENCY MEDICINE

## 2025-07-12 PROCEDURE — 82962 GLUCOSE BLOOD TEST: CPT

## 2025-07-12 PROCEDURE — 11000001 HC ACUTE MED/SURG PRIVATE ROOM

## 2025-07-12 PROCEDURE — 85014 HEMATOCRIT: CPT

## 2025-07-12 PROCEDURE — 63600175 PHARM REV CODE 636 W HCPCS

## 2025-07-12 PROCEDURE — 84132 ASSAY OF SERUM POTASSIUM: CPT

## 2025-07-12 PROCEDURE — 82330 ASSAY OF CALCIUM: CPT

## 2025-07-12 PROCEDURE — 25000003 PHARM REV CODE 250: Performed by: EMERGENCY MEDICINE

## 2025-07-12 PROCEDURE — 96368 THER/DIAG CONCURRENT INF: CPT

## 2025-07-12 PROCEDURE — 82803 BLOOD GASES ANY COMBINATION: CPT

## 2025-07-12 PROCEDURE — 99285 EMERGENCY DEPT VISIT HI MDM: CPT | Mod: 25

## 2025-07-12 PROCEDURE — 96365 THER/PROPH/DIAG IV INF INIT: CPT

## 2025-07-12 PROCEDURE — 94761 N-INVAS EAR/PLS OXIMETRY MLT: CPT

## 2025-07-12 PROCEDURE — 80053 COMPREHEN METABOLIC PANEL: CPT | Performed by: EMERGENCY MEDICINE

## 2025-07-12 PROCEDURE — 82947 ASSAY GLUCOSE BLOOD QUANT: CPT

## 2025-07-12 PROCEDURE — 83735 ASSAY OF MAGNESIUM: CPT | Performed by: EMERGENCY MEDICINE

## 2025-07-12 PROCEDURE — 25000003 PHARM REV CODE 250

## 2025-07-12 PROCEDURE — 93010 ELECTROCARDIOGRAM REPORT: CPT | Mod: ,,, | Performed by: INTERNAL MEDICINE

## 2025-07-12 PROCEDURE — 25000003 PHARM REV CODE 250: Performed by: HOSPITALIST

## 2025-07-12 PROCEDURE — 93005 ELECTROCARDIOGRAM TRACING: CPT

## 2025-07-12 PROCEDURE — 94799 UNLISTED PULMONARY SVC/PX: CPT

## 2025-07-12 PROCEDURE — 83605 ASSAY OF LACTIC ACID: CPT | Performed by: EMERGENCY MEDICINE

## 2025-07-12 PROCEDURE — 96361 HYDRATE IV INFUSION ADD-ON: CPT

## 2025-07-12 PROCEDURE — 36415 COLL VENOUS BLD VENIPUNCTURE: CPT | Performed by: EMERGENCY MEDICINE

## 2025-07-12 PROCEDURE — 83605 ASSAY OF LACTIC ACID: CPT

## 2025-07-12 PROCEDURE — 87040 BLOOD CULTURE FOR BACTERIA: CPT | Performed by: EMERGENCY MEDICINE

## 2025-07-12 RX ORDER — AMLODIPINE BESYLATE 5 MG/1
5 TABLET ORAL DAILY
Status: DISCONTINUED | OUTPATIENT
Start: 2025-07-13 | End: 2025-07-15 | Stop reason: HOSPADM

## 2025-07-12 RX ORDER — IBUPROFEN 200 MG
16 TABLET ORAL
Status: DISCONTINUED | OUTPATIENT
Start: 2025-07-12 | End: 2025-07-15 | Stop reason: HOSPADM

## 2025-07-12 RX ORDER — LOSARTAN POTASSIUM 50 MG/1
50 TABLET ORAL DAILY
Status: DISCONTINUED | OUTPATIENT
Start: 2025-07-13 | End: 2025-07-15 | Stop reason: HOSPADM

## 2025-07-12 RX ORDER — MUPIROCIN 20 MG/G
OINTMENT TOPICAL 2 TIMES DAILY
Status: DISCONTINUED | OUTPATIENT
Start: 2025-07-12 | End: 2025-07-15 | Stop reason: HOSPADM

## 2025-07-12 RX ORDER — HYDROCODONE BITARTRATE AND ACETAMINOPHEN 5; 325 MG/1; MG/1
1 TABLET ORAL EVERY 6 HOURS PRN
Refills: 0 | Status: DISCONTINUED | OUTPATIENT
Start: 2025-07-12 | End: 2025-07-15 | Stop reason: HOSPADM

## 2025-07-12 RX ORDER — GLUCAGON 1 MG
1 KIT INJECTION
Status: DISCONTINUED | OUTPATIENT
Start: 2025-07-12 | End: 2025-07-15 | Stop reason: HOSPADM

## 2025-07-12 RX ORDER — IBUPROFEN 200 MG
24 TABLET ORAL
Status: DISCONTINUED | OUTPATIENT
Start: 2025-07-12 | End: 2025-07-15 | Stop reason: HOSPADM

## 2025-07-12 RX ORDER — ONDANSETRON HYDROCHLORIDE 2 MG/ML
4 INJECTION, SOLUTION INTRAVENOUS EVERY 8 HOURS PRN
Status: DISCONTINUED | OUTPATIENT
Start: 2025-07-12 | End: 2025-07-15 | Stop reason: HOSPADM

## 2025-07-12 RX ORDER — ACETAMINOPHEN 325 MG/1
650 TABLET ORAL EVERY 8 HOURS PRN
Status: DISCONTINUED | OUTPATIENT
Start: 2025-07-12 | End: 2025-07-13

## 2025-07-12 RX ORDER — HYDROCHLOROTHIAZIDE 25 MG/1
25 TABLET ORAL DAILY
Status: DISCONTINUED | OUTPATIENT
Start: 2025-07-13 | End: 2025-07-15 | Stop reason: HOSPADM

## 2025-07-12 RX ORDER — ACETAMINOPHEN 325 MG/1
650 TABLET ORAL EVERY 4 HOURS PRN
Status: DISCONTINUED | OUTPATIENT
Start: 2025-07-12 | End: 2025-07-13

## 2025-07-12 RX ORDER — SODIUM CHLORIDE 9 MG/ML
INJECTION, SOLUTION INTRAVENOUS CONTINUOUS
Status: DISCONTINUED | OUTPATIENT
Start: 2025-07-12 | End: 2025-07-14

## 2025-07-12 RX ORDER — ACETAMINOPHEN 500 MG
1000 TABLET ORAL
Status: COMPLETED | OUTPATIENT
Start: 2025-07-12 | End: 2025-07-12

## 2025-07-12 RX ORDER — INSULIN GLARGINE 100 [IU]/ML
20 INJECTION, SOLUTION SUBCUTANEOUS NIGHTLY
Status: DISCONTINUED | OUTPATIENT
Start: 2025-07-12 | End: 2025-07-14

## 2025-07-12 RX ORDER — TALC
6 POWDER (GRAM) TOPICAL NIGHTLY PRN
Status: DISCONTINUED | OUTPATIENT
Start: 2025-07-12 | End: 2025-07-15 | Stop reason: HOSPADM

## 2025-07-12 RX ORDER — INSULIN ASPART 100 [IU]/ML
0-5 INJECTION, SOLUTION INTRAVENOUS; SUBCUTANEOUS
Status: DISCONTINUED | OUTPATIENT
Start: 2025-07-12 | End: 2025-07-15 | Stop reason: HOSPADM

## 2025-07-12 RX ORDER — HYDROCODONE BITARTRATE AND ACETAMINOPHEN 10; 325 MG/1; MG/1
1 TABLET ORAL EVERY 6 HOURS PRN
Refills: 0 | Status: DISCONTINUED | OUTPATIENT
Start: 2025-07-12 | End: 2025-07-15 | Stop reason: HOSPADM

## 2025-07-12 RX ORDER — ATORVASTATIN CALCIUM 40 MG/1
40 TABLET, FILM COATED ORAL DAILY
Status: DISCONTINUED | OUTPATIENT
Start: 2025-07-13 | End: 2025-07-15 | Stop reason: HOSPADM

## 2025-07-12 RX ADMIN — PIPERACILLIN SODIUM AND TAZOBACTAM SODIUM 4.5 G: 4; .5 INJECTION, POWDER, LYOPHILIZED, FOR SOLUTION INTRAVENOUS at 03:07

## 2025-07-12 RX ADMIN — SODIUM CHLORIDE: 9 INJECTION, SOLUTION INTRAVENOUS at 09:07

## 2025-07-12 RX ADMIN — Medication 6 MG: at 09:07

## 2025-07-12 RX ADMIN — VANCOMYCIN HYDROCHLORIDE 1500 MG: 500 INJECTION, POWDER, LYOPHILIZED, FOR SOLUTION INTRAVENOUS at 03:07

## 2025-07-12 RX ADMIN — PIPERACILLIN SODIUM AND TAZOBACTAM SODIUM 4.5 G: 4; .5 INJECTION, POWDER, LYOPHILIZED, FOR SOLUTION INTRAVENOUS at 08:07

## 2025-07-12 RX ADMIN — MUPIROCIN: 20 OINTMENT TOPICAL at 09:07

## 2025-07-12 RX ADMIN — INSULIN GLARGINE 20 UNITS: 100 INJECTION, SOLUTION SUBCUTANEOUS at 08:07

## 2025-07-12 RX ADMIN — ACETAMINOPHEN 1000 MG: 500 TABLET ORAL at 02:07

## 2025-07-12 RX ADMIN — HYDROCODONE BITARTRATE AND ACETAMINOPHEN 1 TABLET: 10; 325 TABLET ORAL at 07:07

## 2025-07-12 RX ADMIN — SODIUM CHLORIDE 2000 ML: 9 INJECTION, SOLUTION INTRAVENOUS at 03:07

## 2025-07-12 NOTE — PHARMACY MED REC
"Admission Medication History     The home medication history was taken by Sofi Puga.    You may go to "Admission" then "Reconcile Home Medications" tabs to review and/or act upon these items.     The home medication list has been updated by the Pharmacy department.   Please read ALL comments highlighted in yellow.   Please address this information as you see fit.    Feel free to contact us if you have any questions or require assistance.    The patient stated that he has only been able to  the ciprofloxacin 500 mg and insulin 70/30 and has not started the following medications:  Amlodipine 5 mg  Atorvastatin 40 mg  Losartan-HCTZ 50-12.5 mg  Metformin 500 mg        Medications listed below were obtained from: Patient/family, Analytic software- [employer], and Medical records  (Not in a hospital admission)        Current Outpatient Medications on File Prior to Encounter   Medication Sig Dispense Refill Last Dose/Taking    amLODIPine (NORVASC) 5 MG tablet Take 1 tablet (5 mg total) by mouth once daily. 30 tablet 0 Unknown    atorvastatin (LIPITOR) 40 MG tablet Take 1 tablet (40 mg total) by mouth once daily. 30 tablet 0 Unknown    insulin NPH-insulin regular, 70/30, (HUMULIN 70/30 U-100 INSULIN) 100 unit/mL (70-30) injection Inject 10 Units into the skin 2 (two) times daily. (Patient taking differently: Inject 10 Units into the skin 2 (two) times daily. PAP referred) 6 mL 11 7/12/2025    losartan-hydrochlorothiazide 50-12.5 mg (HYZAAR) 50-12.5 mg per tablet Take 1 tablet by mouth once daily. 30 tablet 0 Unknown    metFORMIN (GLUCOPHAGE) 500 MG tablet Take 1 tablet (500 mg total) by mouth 2 (two) times daily with meals. 60 tablet 0 Unknown    alcohol swabs (ALCOHOL PADS) PadM Apply 1 each topically as needed. 200 each 11     ciprofloxacin HCl (CIPRO) 500 MG tablet Take 1 tablet (500 mg total) by mouth every 12 (twelve) hours. 20 tablet 0 7/12/2025    insulin syringe-needle U-100 0.3 mL 29 gauge Syrg 1 Needle by " Misc.(Non-Drug; Combo Route) route 2 (two) times a day. 100 each 11     [DISCONTINUED] acetaminophen (TYLENOL) 325 MG tablet Take 2 tablets (650 mg total) by mouth every 8 (eight) hours as needed for Temperature greater than or Pain (101). (Patient not taking: Reported on 7/2/2025)       [DISCONTINUED] mupirocin (BACTROBAN) 2 % ointment Apply topically 3 (three) times daily. (Patient not taking: Reported on 7/2/2025) 22 g 0        Potential issues to be addressed PRIOR TO DISCHARGE  Please discuss with the patient barriers to adherence with medication treatment plans  Drug cost interfering with therapy: ( )  Prescriptions could not be filled prior to admission: ( )    Sofi Puga  EXT [id number]                 .

## 2025-07-12 NOTE — ED PROVIDER NOTES
Encounter Date: 2025    SCRIBE #1 NOTE: I, Leela Avani, am scribing for, and in the presence of,  Lacho Smith MD.       History     Chief Complaint   Patient presents with    Wound Check    Fever     Pt presents to the ED with c/o wound to right leg and right foot with a fever. Pt was seen at urgent care and told to come here because pt would possibly need surgery on right foot. Pt with hx of diabetes and B in triage. Pt also states feeling like he's had a fever the past couple of days.      This 49 y.o. male pt presents to the ED with c/o Wound on Foot and Fever. The pt reports having a wound on the bottom of his right foot and front of lower right leg. Pt was seen at urgent care and give Insulin and antibiotics. Pt was then told to come here  to the ED because pt would possibly need surgery on right foot. Pt states he has sharp pain that comes from the wound on the bottom of his foot up to his groin area. Pt with hx of T2DM and HTN. Pt also states feeling like he's had a fever the past couple of days.     The history is provided by the patient. No  was used.     Review of patient's allergies indicates:  No Known Allergies  Past Medical History:   Diagnosis Date    Diabetes mellitus, type 2     Hyperlipidemia     Hypertension      No past surgical history on file.  Family History   Problem Relation Name Age of Onset    Heart disease Mother      Diabetes Father      Hypertension Father       Social History[1]  Review of Systems   Constitutional:  Positive for fever. Negative for activity change, chills and diaphoresis.   HENT:  Negative for congestion, drooling, ear pain, rhinorrhea, sneezing, sore throat and trouble swallowing.    Eyes:  Negative for pain.   Respiratory:  Negative for cough, chest tightness, shortness of breath, wheezing and stridor.    Cardiovascular:  Negative for chest pain, palpitations and leg swelling.   Gastrointestinal:  Negative for abdominal  distention, abdominal pain, constipation, diarrhea, nausea and vomiting.   Endocrine:        Pt has T2 DM.    Genitourinary:  Negative for difficulty urinating, dysuria, frequency and urgency.   Musculoskeletal:  Negative for arthralgias, back pain, myalgias, neck pain and neck stiffness.   Skin:  Positive for wound. Negative for pallor and rash.   Neurological:  Negative for dizziness, syncope, weakness, light-headedness, numbness and headaches.   All other systems reviewed and are negative.      Physical Exam     Initial Vitals [07/12/25 1410]   BP Pulse Resp Temp SpO2   134/83 (!) 127 (!) 22 (!) 102.7 °F (39.3 °C) 95 %      MAP       --         Physical Exam    Nursing note and vitals reviewed.  Constitutional: He appears well-developed and well-nourished.   HENT:   Head: Normocephalic and atraumatic.   Eyes: EOM are normal. Pupils are equal, round, and reactive to light.   Neck: Neck supple. No thyromegaly present. No JVD present.   Normal range of motion.  Cardiovascular:  Normal rate, regular rhythm, normal heart sounds and intact distal pulses.           No murmur heard.  Pulmonary/Chest: Breath sounds normal. No stridor. No respiratory distress. He has no wheezes.   Abdominal: Abdomen is soft. Bowel sounds are normal. He exhibits no distension. There is no abdominal tenderness.   Musculoskeletal:         General: Tenderness and edema present. Normal range of motion.      Cervical back: Normal range of motion and neck supple.      Right foot: Swelling and tenderness present.      Left foot: Normal.        Legs:       Comments: Pt has infections under his toes on his right foot and on his right lower leg      Lymphadenopathy:     He has no cervical adenopathy.   Neurological: He is alert and oriented to person, place, and time. He has normal strength. No cranial nerve deficit or sensory deficit. GCS score is 15. GCS eye subscore is 4. GCS verbal subscore is 5. GCS motor subscore is 6.   Skin: Skin is warm and  dry. Capillary refill takes less than 2 seconds. No rash noted.   Psychiatric: He has a normal mood and affect.         ED Course   Procedures  Labs Reviewed   COMPREHENSIVE METABOLIC PANEL - Abnormal       Result Value    Sodium 130 (*)     Potassium 3.8      Chloride 98      CO2 21 (*)     Anion Gap 15      Glucose 293 (*)     BUN 11      Creatinine 0.81      BUN/Creatinine Ratio 14      Calcium 8.6      Total Protein 7.2      Albumin 2.5 (*)     Globulin 4.7 (*)     A/G Ratio 0.5      Bilirubin, Total 0.6      Alk Phos 107      ALT 9      AST 20      eGFR 109     CBC WITH DIFFERENTIAL - Abnormal    WBC 14.09 (*)     RBC 4.13 (*)     Hemoglobin 13.2 (*)     Hematocrit 39.3 (*)     MCV 95.2      MCH 32.0 (*)     MCHC 33.6      RDW 11.9      Platelet Count 230      MPV 10.0      Neutrophils % 80.7 (*)     Lymphocytes % 7.9 (*)     Monocytes % 10.5 (*)     Eosinophils % 0.1 (*)     Basophils % 0.3      Immature Granulocytes % 0.5 (*)     nRBC, Auto 0.0      Neutrophils, Abs 11.36 (*)     Lymphocytes, Absolute 1.12      Monocytes, Absolute 1.48 (*)     Eosinophils, Absolute 0.02      Basophils, Absolute 0.04      Immature Granulocytes, Absolute 0.07 (*)     nRBC, Absolute 0.00      Diff Type Auto     POCT GLUCOSE MONITORING CONTINUOUS - Abnormal    POC Glucose 296 (*)    LACTIC ACID, PLASMA - Normal    Lactic Acid 1.1     MAGNESIUM - Normal    Magnesium 1.6     CULTURE, BLOOD   CULTURE, BLOOD   CBC W/ AUTO DIFFERENTIAL    Narrative:     The following orders were created for panel order CBC auto differential.  Procedure                               Abnormality         Status                     ---------                               -----------         ------                     CBC with Differential[[id number]]       Abnormal            Final result                 Please view results for these tests on the individual orders.   URINALYSIS, REFLEX TO URINE CULTURE          Imaging Results              X-Ray Chest AP  Portable (In process)                      X-Ray Foot Complete Right (In process)                      Medications   vancomycin (VANCOCIN) 1,500 mg in 0.9% NaCl 250 mL IVPB (1,500 mg Intravenous New Bag 25 1526)   acetaminophen tablet 1,000 mg (1,000 mg Oral Given 25 1414)   piperacillin-tazobactam (ZOSYN) 4.5 g in D5W 100 mL IVPB (MB+) (4.5 g Intravenous New Bag 25 1526)   sodium chloride 0.9% bolus 2,000 mL 2,000 mL (2,000 mLs Intravenous New Bag 25 1525)     Medical Decision Making  Amount and/or Complexity of Data Reviewed  Labs: ordered.  Radiology: ordered.    Risk  OTC drugs.              Attending Attestation:           Physician Attestation for Scribe:  Physician Attestation Statement for Scribe #1: ILacho MD, reviewed documentation, as scribed by Leela Varma in my presence, and it is both accurate and complete.             ED Course as of 25   Sat  Discussed with hospitalist.  Will admit for further management.  Sepsis/gangrene.  Perfusing well. [PK]      ED Course User Index  [PK] Lacho Smith MD                               Clinical Impression:  Final diagnoses:  [Z13.6] Screening for cardiovascular condition  [I96] Gangrene of right foot  [A41.9] Sepsis, due to unspecified organism, unspecified whether acute organ dysfunction present (Primary)          ED Disposition Condition    Admit                                 [1]   Social History  Tobacco Use    Smoking status: Every Day     Current packs/day: 0.00     Types: Vaping with nicotine, Cigarettes     Last attempt to quit: 2019     Years since quittin.5    Smokeless tobacco: Never   Substance Use Topics    Alcohol use: Yes     Comment: 6 pack a day    Drug use: Never        Lacho Smith MD  25 [phone]

## 2025-07-12 NOTE — Clinical Note
Diagnosis: Sepsis, due to unspecified organism, unspecified whether acute organ dysfunction present [[id number]]   Reason for IP Medical Treatment  (Clinical interventions that can only be accomplished in the IP setting? ) :: treatment   [Follow-up Visit ___] : a follow-up visit  for [unfilled]

## 2025-07-12 NOTE — PROGRESS NOTES
Initial Pharmacy Consult    Consulted to assist in the management of Vanc therapy in this 48 y/o male with SSTI.     Labs: BUN    11            SCR:   0.81            CRCL: 132    Based on the patient's weight of 100kg and the most recent lab data available, the following therapy will be initiated: Vanc 2000mg iv q12hr starting at 0300.   (Vabnc 1500mg iv x 1 given in ER at 1530). Will check Vanc trough prior to the 4th dose on 7/14 and make adjustments if necessary.  Will Continue to monitor.    EDITA Reeves.Ph.

## 2025-07-13 ENCOUNTER — ANESTHESIA EVENT (OUTPATIENT)
Dept: SURGERY | Facility: HOSPITAL | Age: 50
DRG: 854 | End: 2025-07-13

## 2025-07-13 ENCOUNTER — ANESTHESIA (OUTPATIENT)
Dept: SURGERY | Facility: HOSPITAL | Age: 50
DRG: 854 | End: 2025-07-13

## 2025-07-13 PROBLEM — L97.513: Status: ACTIVE | Noted: 2025-07-13

## 2025-07-13 LAB
ALBUMIN SERPL BCP-MCNC: 2.1 G/DL (ref 3.5–5)
ALBUMIN/GLOB SERPL: 0.5 {RATIO}
ALP SERPL-CCNC: 89 U/L (ref 40–150)
ALT SERPL W P-5'-P-CCNC: 7 U/L
ANION GAP SERPL CALCULATED.3IONS-SCNC: 14 MMOL/L (ref 7–16)
AST SERPL W P-5'-P-CCNC: 10 U/L (ref 11–45)
BASOPHILS # BLD AUTO: 0.05 K/UL (ref 0–0.2)
BASOPHILS NFR BLD AUTO: 0.3 % (ref 0–1)
BILIRUB SERPL-MCNC: 0.5 MG/DL
BUN SERPL-MCNC: 8 MG/DL (ref 9–21)
BUN/CREAT SERPL: 12 (ref 6–20)
CALCIUM SERPL-MCNC: 8.1 MG/DL (ref 8.4–10.2)
CHLORIDE SERPL-SCNC: 104 MMOL/L (ref 98–107)
CO2 SERPL-SCNC: 20 MMOL/L (ref 22–29)
CREAT SERPL-MCNC: 0.66 MG/DL (ref 0.72–1.25)
DIFFERENTIAL METHOD BLD: ABNORMAL
EGFR (NO RACE VARIABLE) (RUSH/TITUS): 116 ML/MIN/1.73M2
EOSINOPHIL # BLD AUTO: 0.03 K/UL (ref 0–0.5)
EOSINOPHIL NFR BLD AUTO: 0.2 % (ref 1–4)
ERYTHROCYTE [DISTWIDTH] IN BLOOD BY AUTOMATED COUNT: 11.9 % (ref 11.5–14.5)
GLOBULIN SER-MCNC: 4 G/DL (ref 2–4)
GLUCOSE SERPL-MCNC: 218 MG/DL (ref 74–100)
GLUCOSE SERPL-MCNC: 221 MG/DL (ref 70–105)
GLUCOSE SERPL-MCNC: 244 MG/DL (ref 70–105)
GLUCOSE SERPL-MCNC: 267 MG/DL (ref 70–105)
GLUCOSE SERPL-MCNC: 318 MG/DL (ref 70–105)
GLUCOSE SERPL-MCNC: 329 MG/DL (ref 70–105)
HCT VFR BLD AUTO: 36.1 % (ref 40–54)
HGB BLD-MCNC: 11.9 G/DL (ref 13.5–18)
IMM GRANULOCYTES # BLD AUTO: 0.08 K/UL (ref 0–0.04)
IMM GRANULOCYTES NFR BLD: 0.5 % (ref 0–0.4)
LYMPHOCYTES # BLD AUTO: 1.21 K/UL (ref 1–4.8)
LYMPHOCYTES NFR BLD AUTO: 8.3 % (ref 27–41)
MAGNESIUM SERPL-MCNC: 1.7 MG/DL (ref 1.6–2.6)
MCH RBC QN AUTO: 32.2 PG (ref 27–31)
MCHC RBC AUTO-ENTMCNC: 33 G/DL (ref 32–36)
MCV RBC AUTO: 97.8 FL (ref 80–96)
MONOCYTES # BLD AUTO: 1.58 K/UL (ref 0–0.8)
MONOCYTES NFR BLD AUTO: 10.8 % (ref 2–6)
MPC BLD CALC-MCNC: 10.4 FL (ref 9.4–12.4)
NEUTROPHILS # BLD AUTO: 11.7 K/UL (ref 1.8–7.7)
NEUTROPHILS NFR BLD AUTO: 79.9 % (ref 53–65)
NRBC # BLD AUTO: 0 X10E3/UL
NRBC, AUTO (.00): 0 %
PHOSPHATE SERPL-MCNC: 2.8 MG/DL (ref 2.3–4.7)
PLATELET # BLD AUTO: 216 K/UL (ref 150–400)
POTASSIUM SERPL-SCNC: 3.6 MMOL/L (ref 3.5–5.1)
PROT SERPL-MCNC: 6.1 G/DL (ref 6.4–8.3)
RBC # BLD AUTO: 3.69 M/UL (ref 4.6–6.2)
SODIUM SERPL-SCNC: 134 MMOL/L (ref 136–145)
WBC # BLD AUTO: 14.65 K/UL (ref 4.5–11)

## 2025-07-13 PROCEDURE — 88304 TISSUE EXAM BY PATHOLOGIST: CPT | Mod: TC,SUR | Performed by: STUDENT IN AN ORGANIZED HEALTH CARE EDUCATION/TRAINING PROGRAM

## 2025-07-13 PROCEDURE — 71000033 HC RECOVERY, INTIAL HOUR: Performed by: STUDENT IN AN ORGANIZED HEALTH CARE EDUCATION/TRAINING PROGRAM

## 2025-07-13 PROCEDURE — 37000008 HC ANESTHESIA 1ST 15 MINUTES: Performed by: STUDENT IN AN ORGANIZED HEALTH CARE EDUCATION/TRAINING PROGRAM

## 2025-07-13 PROCEDURE — C1729 CATH, DRAINAGE: HCPCS | Performed by: STUDENT IN AN ORGANIZED HEALTH CARE EDUCATION/TRAINING PROGRAM

## 2025-07-13 PROCEDURE — 27000655: Performed by: NURSE ANESTHETIST, CERTIFIED REGISTERED

## 2025-07-13 PROCEDURE — 63600175 PHARM REV CODE 636 W HCPCS: Performed by: NURSE ANESTHETIST, CERTIFIED REGISTERED

## 2025-07-13 PROCEDURE — 36000707: Performed by: STUDENT IN AN ORGANIZED HEALTH CARE EDUCATION/TRAINING PROGRAM

## 2025-07-13 PROCEDURE — 25000003 PHARM REV CODE 250: Performed by: STUDENT IN AN ORGANIZED HEALTH CARE EDUCATION/TRAINING PROGRAM

## 2025-07-13 PROCEDURE — 63600175 PHARM REV CODE 636 W HCPCS: Performed by: STUDENT IN AN ORGANIZED HEALTH CARE EDUCATION/TRAINING PROGRAM

## 2025-07-13 PROCEDURE — 63600175 PHARM REV CODE 636 W HCPCS

## 2025-07-13 PROCEDURE — 80053 COMPREHEN METABOLIC PANEL: CPT

## 2025-07-13 PROCEDURE — 83735 ASSAY OF MAGNESIUM: CPT

## 2025-07-13 PROCEDURE — 27000510 HC BLANKET BAIR HUGGER ANY SIZE: Performed by: NURSE ANESTHETIST, CERTIFIED REGISTERED

## 2025-07-13 PROCEDURE — 36415 COLL VENOUS BLD VENIPUNCTURE: CPT

## 2025-07-13 PROCEDURE — 84100 ASSAY OF PHOSPHORUS: CPT

## 2025-07-13 PROCEDURE — 88304 TISSUE EXAM BY PATHOLOGIST: CPT | Mod: 26,,, | Performed by: PATHOLOGY

## 2025-07-13 PROCEDURE — 0QBN0ZZ EXCISION OF RIGHT METATARSAL, OPEN APPROACH: ICD-10-PCS | Performed by: STUDENT IN AN ORGANIZED HEALTH CARE EDUCATION/TRAINING PROGRAM

## 2025-07-13 PROCEDURE — 87186 SC STD MICRODIL/AGAR DIL: CPT | Performed by: STUDENT IN AN ORGANIZED HEALTH CARE EDUCATION/TRAINING PROGRAM

## 2025-07-13 PROCEDURE — 27000177 HC AIRWAY, LARYNGEAL MASK: Performed by: NURSE ANESTHETIST, CERTIFIED REGISTERED

## 2025-07-13 PROCEDURE — 87075 CULTR BACTERIA EXCEPT BLOOD: CPT | Performed by: STUDENT IN AN ORGANIZED HEALTH CARE EDUCATION/TRAINING PROGRAM

## 2025-07-13 PROCEDURE — 99233 SBSQ HOSP IP/OBS HIGH 50: CPT | Mod: ,,,

## 2025-07-13 PROCEDURE — 25000003 PHARM REV CODE 250

## 2025-07-13 PROCEDURE — 25000003 PHARM REV CODE 250: Performed by: NURSE ANESTHETIST, CERTIFIED REGISTERED

## 2025-07-13 PROCEDURE — 85025 COMPLETE CBC W/AUTO DIFF WBC: CPT

## 2025-07-13 PROCEDURE — 27000207 HC ISOLATION

## 2025-07-13 PROCEDURE — 11043 DBRDMT MUSC&/FSCA 1ST 20/<: CPT | Mod: ,,, | Performed by: STUDENT IN AN ORGANIZED HEALTH CARE EDUCATION/TRAINING PROGRAM

## 2025-07-13 PROCEDURE — 82962 GLUCOSE BLOOD TEST: CPT

## 2025-07-13 PROCEDURE — 99223 1ST HOSP IP/OBS HIGH 75: CPT | Mod: 25,,, | Performed by: STUDENT IN AN ORGANIZED HEALTH CARE EDUCATION/TRAINING PROGRAM

## 2025-07-13 PROCEDURE — 37000009 HC ANESTHESIA EA ADD 15 MINS: Performed by: STUDENT IN AN ORGANIZED HEALTH CARE EDUCATION/TRAINING PROGRAM

## 2025-07-13 PROCEDURE — 36000706: Performed by: STUDENT IN AN ORGANIZED HEALTH CARE EDUCATION/TRAINING PROGRAM

## 2025-07-13 PROCEDURE — 27000716 HC OXISENSOR PROBE, ANY SIZE: Performed by: NURSE ANESTHETIST, CERTIFIED REGISTERED

## 2025-07-13 PROCEDURE — 87176 TISSUE HOMOGENIZATION CULTR: CPT | Performed by: STUDENT IN AN ORGANIZED HEALTH CARE EDUCATION/TRAINING PROGRAM

## 2025-07-13 PROCEDURE — 11000001 HC ACUTE MED/SURG PRIVATE ROOM

## 2025-07-13 RX ORDER — PROPOFOL 10 MG/ML
VIAL (ML) INTRAVENOUS
Status: DISCONTINUED | OUTPATIENT
Start: 2025-07-13 | End: 2025-07-13

## 2025-07-13 RX ORDER — MORPHINE SULFATE 10 MG/ML
4 INJECTION INTRAMUSCULAR; INTRAVENOUS; SUBCUTANEOUS EVERY 5 MIN PRN
Status: DISCONTINUED | OUTPATIENT
Start: 2025-07-13 | End: 2025-07-13 | Stop reason: HOSPADM

## 2025-07-13 RX ORDER — IPRATROPIUM BROMIDE AND ALBUTEROL SULFATE 2.5; .5 MG/3ML; MG/3ML
3 SOLUTION RESPIRATORY (INHALATION) ONCE
Status: DISCONTINUED | OUTPATIENT
Start: 2025-07-13 | End: 2025-07-13 | Stop reason: HOSPADM

## 2025-07-13 RX ORDER — ONDANSETRON HYDROCHLORIDE 2 MG/ML
INJECTION, SOLUTION INTRAVENOUS
Status: DISCONTINUED | OUTPATIENT
Start: 2025-07-13 | End: 2025-07-13

## 2025-07-13 RX ORDER — HYDROMORPHONE HYDROCHLORIDE 2 MG/ML
0.5 INJECTION, SOLUTION INTRAMUSCULAR; INTRAVENOUS; SUBCUTANEOUS EVERY 5 MIN PRN
Status: DISCONTINUED | OUTPATIENT
Start: 2025-07-13 | End: 2025-07-13 | Stop reason: HOSPADM

## 2025-07-13 RX ORDER — FENTANYL CITRATE 50 UG/ML
INJECTION, SOLUTION INTRAMUSCULAR; INTRAVENOUS
Status: DISCONTINUED | OUTPATIENT
Start: 2025-07-13 | End: 2025-07-13

## 2025-07-13 RX ORDER — LIDOCAINE HYDROCHLORIDE 20 MG/ML
INJECTION, SOLUTION EPIDURAL; INFILTRATION; INTRACAUDAL; PERINEURAL
Status: DISCONTINUED | OUTPATIENT
Start: 2025-07-13 | End: 2025-07-13

## 2025-07-13 RX ORDER — HYDROMORPHONE HYDROCHLORIDE 2 MG/ML
0.5 INJECTION, SOLUTION INTRAMUSCULAR; INTRAVENOUS; SUBCUTANEOUS
Status: DISCONTINUED | OUTPATIENT
Start: 2025-07-13 | End: 2025-07-15 | Stop reason: HOSPADM

## 2025-07-13 RX ORDER — ONDANSETRON HYDROCHLORIDE 2 MG/ML
4 INJECTION, SOLUTION INTRAVENOUS DAILY PRN
Status: DISCONTINUED | OUTPATIENT
Start: 2025-07-13 | End: 2025-07-13 | Stop reason: HOSPADM

## 2025-07-13 RX ORDER — DIPHENHYDRAMINE HYDROCHLORIDE 50 MG/ML
25 INJECTION, SOLUTION INTRAMUSCULAR; INTRAVENOUS EVERY 6 HOURS PRN
Status: DISCONTINUED | OUTPATIENT
Start: 2025-07-13 | End: 2025-07-13 | Stop reason: HOSPADM

## 2025-07-13 RX ORDER — ACETAMINOPHEN 10 MG/ML
1000 INJECTION, SOLUTION INTRAVENOUS ONCE
Status: DISCONTINUED | OUTPATIENT
Start: 2025-07-13 | End: 2025-07-13 | Stop reason: HOSPADM

## 2025-07-13 RX ADMIN — MUPIROCIN: 20 OINTMENT TOPICAL at 11:07

## 2025-07-13 RX ADMIN — SODIUM CHLORIDE: 9 INJECTION, SOLUTION INTRAVENOUS at 08:07

## 2025-07-13 RX ADMIN — PIPERACILLIN SODIUM AND TAZOBACTAM SODIUM 4.5 G: 4; .5 INJECTION, POWDER, LYOPHILIZED, FOR SOLUTION INTRAVENOUS at 01:07

## 2025-07-13 RX ADMIN — INSULIN GLARGINE 20 UNITS: 100 INJECTION, SOLUTION SUBCUTANEOUS at 09:07

## 2025-07-13 RX ADMIN — FENTANYL CITRATE 100 MCG: 50 INJECTION, SOLUTION INTRAMUSCULAR; INTRAVENOUS at 08:07

## 2025-07-13 RX ADMIN — PIPERACILLIN SODIUM AND TAZOBACTAM SODIUM 4.5 G: 4; .5 INJECTION, POWDER, LYOPHILIZED, FOR SOLUTION INTRAVENOUS at 05:07

## 2025-07-13 RX ADMIN — ATORVASTATIN CALCIUM 40 MG: 40 TABLET, FILM COATED ORAL at 11:07

## 2025-07-13 RX ADMIN — LIDOCAINE HYDROCHLORIDE 50 MG: 20 INJECTION, SOLUTION EPIDURAL; INFILTRATION; INTRACAUDAL; PERINEURAL at 08:07

## 2025-07-13 RX ADMIN — INSULIN ASPART 4 UNITS: 100 INJECTION, SOLUTION INTRAVENOUS; SUBCUTANEOUS at 11:07

## 2025-07-13 RX ADMIN — LOSARTAN POTASSIUM 50 MG: 50 TABLET, FILM COATED ORAL at 11:07

## 2025-07-13 RX ADMIN — SODIUM CHLORIDE 2000 MG: 9 INJECTION, SOLUTION INTRAVENOUS at 03:07

## 2025-07-13 RX ADMIN — Medication 6 MG: at 09:07

## 2025-07-13 RX ADMIN — AMLODIPINE BESYLATE 5 MG: 5 TABLET ORAL at 11:07

## 2025-07-13 RX ADMIN — HYDROMORPHONE HYDROCHLORIDE 0.5 MG: 2 INJECTION INTRAMUSCULAR; INTRAVENOUS; SUBCUTANEOUS at 05:07

## 2025-07-13 RX ADMIN — HYDROCHLOROTHIAZIDE 25 MG: 25 TABLET ORAL at 11:07

## 2025-07-13 RX ADMIN — HYDROCODONE BITARTRATE AND ACETAMINOPHEN 1 TABLET: 10; 325 TABLET ORAL at 09:07

## 2025-07-13 RX ADMIN — PROPOFOL 170 MG: 10 INJECTION, EMULSION INTRAVENOUS at 08:07

## 2025-07-13 RX ADMIN — ONDANSETRON 4 MG: 2 INJECTION INTRAMUSCULAR; INTRAVENOUS at 08:07

## 2025-07-13 RX ADMIN — SODIUM CHLORIDE: 9 INJECTION, SOLUTION INTRAVENOUS at 09:07

## 2025-07-13 RX ADMIN — HYDROCODONE BITARTRATE AND ACETAMINOPHEN 1 TABLET: 10; 325 TABLET ORAL at 11:07

## 2025-07-13 RX ADMIN — PIPERACILLIN SODIUM AND TAZOBACTAM SODIUM 4.5 G: 4; .5 INJECTION, POWDER, LYOPHILIZED, FOR SOLUTION INTRAVENOUS at 09:07

## 2025-07-13 RX ADMIN — MUPIROCIN: 20 OINTMENT TOPICAL at 09:07

## 2025-07-13 RX ADMIN — INSULIN ASPART 2 UNITS: 100 INJECTION, SOLUTION INTRAVENOUS; SUBCUTANEOUS at 09:07

## 2025-07-13 NOTE — ASSESSMENT & PLAN NOTE
Patient's blood pressure range in the last 24 hours was: BP  Min: 95/64  Max: 137/95.The patient's inpatient anti-hypertensive regimen is listed below:  Current Antihypertensives  amLODIPine tablet 5 mg, Daily, Oral  losartan tablet 50 mg, Daily, Oral  hydroCHLOROthiazide tablet 25 mg, Daily, Oral    Plan  - BP is controlled, no changes needed to their regimen

## 2025-07-13 NOTE — TRANSFER OF CARE
"Anesthesia Transfer of Care Note    Patient: Wallace Pedro    Procedure(s) Performed: Procedure(s) (LRB):  DEBRIDEMENT, RIGHT FOOT (Right)    Patient location: PACU    Anesthesia Type: general    Transport from OR: Transported from OR on room air with adequate spontaneous ventilation    Post pain: adequate analgesia    Post assessment: no apparent anesthetic complications    Post vital signs: stable    Level of consciousness: sedated    Nausea/Vomiting: no nausea/vomiting    Complications: none    Transfer of care protocol was followed      Last vitals: Visit Vitals  BP (!) 96/57 (Patient Position: Lying)   Pulse 94   Temp 37.1 °C (98.8 °F) (Oral)   Resp 15   Ht 5' 9" (1.753 m)   Wt 100.9 kg (222 lb 8 oz)   SpO2 97%   BMI 32.86 kg/m²     "

## 2025-07-13 NOTE — ASSESSMENT & PLAN NOTE
Patient has sepsis without organ dysfunction secondary to Skin and Soft Tissue Infection: Cellulitis. A review of systems was completed. Patient's sepsis is worsening. Will continue current treatment    Current Antibiotics    vancomycin - pharmacy to dose, pharmacy to manage frequency, Intravenous  vancomycin (VANCOCIN) 2,000 mg in 0.9% NaCl 500 mL IVPB, Every 12 hours (non-standard times), Intravenous  piperacillin-tazobactam (ZOSYN) 4.5 g in D5W 100 mL IVPB (MB+), Every 8 hours (non-standard times), Intravenous    Lactate  Recent Labs   Lab 07/12/25  1502 07/12/25  1543   LACTATE 1.1  --    POCLAC  --  1.0     Culture Data  Blood Cultures   Culture, Blood   Date Value Ref Range Status   05/03/2024 No Growth at 5 days  Final   05/03/2024 No Growth at 5 days  Final      mSOFA  MSOFA Total  Min: 0   Min taken time: 07/13/25 1701  Max: 0   Max taken time: 07/13/25 1701    Plan  - Antibiotics as listed above  - Fluid resuscitation as follows:Ideal Body Weight- The patient is obese (BMI>30) and their ideal body weight of Ideal body weight: 70.7 kg (155 lb 13.8 oz) will be used to calculate fluid bolus of 30 ml/kg.   - Trend lactate to resolution  - Follow up culture data  - Vasopressors were not needed  - The following services were consulted:Hospital Medicine and Surgical Specialties.  - see above

## 2025-07-13 NOTE — ASSESSMENT & PLAN NOTE
Patient's blood pressure range in the last 24 hours was: BP  Min: 95/64  Max: 151/87.The patient's inpatient anti-hypertensive regimen is listed below:  Current Antihypertensives  amLODIPine tablet 5 mg, Daily, Oral  losartan tablet 50 mg, Daily, Oral  hydroCHLOROthiazide tablet 25 mg, Daily, Oral    Plan  - BP is controlled, no changes needed to their regimen

## 2025-07-13 NOTE — H&P
[facility]  [facility] Medicine  History & Physical    Patient Name: Wallace Pedro  MRN: [id number]  Patient Class: IP- Inpatient  Admission Date: 2025  Attending Physician: Neela Machado MD   Primary Care Provider: Jamilah Primary Doctor         Patient information was obtained from patient and ER records.     Subjective:     Principal Problem:Cellulitis of multiple sites of lower extremity    Chief Complaint:   Chief Complaint   Patient presents with    Wound Check    Fever     Pt presents to the ED with c/o wound to right leg and right foot with a fever. Pt was seen at urgent care and told to come here because pt would possibly need surgery on right foot. Pt with hx of diabetes and B in triage. Pt also states feeling like he's had a fever the past couple of days.         HPI: 48 yo male with pmh of T2DM presents to the ER for non-healing right foot wound with fever, chills, and pain for the last week. Wound has been present for 3 mohts but progressed in the 2 weeks. He also has additional wounds on the leg which are noted. Discussed with ED provider and agree with admission to hospital for abx and surgical consult.    Past Medical History:   Diagnosis Date    Diabetes mellitus, type 2     Hyperlipidemia     Hypertension        History reviewed. No pertinent surgical history.    Review of patient's allergies indicates:  No Known Allergies    No current facility-administered medications on file prior to encounter.     Current Outpatient Medications on File Prior to Encounter   Medication Sig    ciprofloxacin HCl (CIPRO) 500 MG tablet Take 1 tablet (500 mg total) by mouth every 12 (twelve) hours.    insulin NPH-insulin regular, 70/30, (HUMULIN 70/30 U-100 INSULIN) 100 unit/mL (70-30) injection Inject 10 Units into the skin 2 (two) times daily. (Patient taking differently: Inject 10 Units into the skin 2 (two) times daily. PAP referred)    alcohol swabs (ALCOHOL PADS) PadM Apply 1 each  topically as needed.    amLODIPine (NORVASC) 5 MG tablet Take 1 tablet (5 mg total) by mouth once daily.    atorvastatin (LIPITOR) 40 MG tablet Take 1 tablet (40 mg total) by mouth once daily.    insulin syringe-needle U-100 0.3 mL 29 gauge Syrg 1 Needle by Misc.(Non-Drug; Combo Route) route 2 (two) times a day.    losartan-hydrochlorothiazide 50-12.5 mg (HYZAAR) 50-12.5 mg per tablet Take 1 tablet by mouth once daily.    metFORMIN (GLUCOPHAGE) 500 MG tablet Take 1 tablet (500 mg total) by mouth 2 (two) times daily with meals.     Family History       Problem Relation (Age of Onset)    Diabetes Father    Heart disease Mother    Hypertension Father          Tobacco Use    Smoking status: Every Day     Current packs/day: 0.00     Types: Vaping with nicotine, Cigarettes     Last attempt to quit: 2019     Years since quittin.5    Smokeless tobacco: Never   Substance and Sexual Activity    Alcohol use: Yes     Comment: 6 pack a day    Drug use: Never    Sexual activity: Yes     Partners: Female     Review of Systems   All other systems reviewed and are negative.    Objective:     Vital Signs (Most Recent):  Temp: 98.7 °F (37.1 °C) (25 1145)  Pulse: 102 (25 1145)  Resp: 20 (25 1145)  BP: 128/83 (25 1145)  SpO2: (!) 94 % (25 1145) Vital Signs (24h Range):  Temp:  [98.1 °F (36.7 °C)-102.7 °F (39.3 °C)] 98.7 °F (37.1 °C)  Pulse:  [] 102  Resp:  [10-26] 20  SpO2:  [91 %-97 %] 94 %  BP: ()/(52-95) 128/83     Weight: 100.9 kg (222 lb 8 oz)  Body mass index is 32.86 kg/m².     Physical Exam  Vitals and nursing note reviewed.   Constitutional:       Appearance: He is ill-appearing.   HENT:      Mouth/Throat:      Mouth: Mucous membranes are dry.   Eyes:      Pupils: Pupils are equal, round, and reactive to light.   Neck:      Vascular: No carotid bruit.   Cardiovascular:      Rate and Rhythm: Regular rhythm. Tachycardia present.      Heart sounds: No murmur heard.  Pulmonary:       Effort: Pulmonary effort is normal.      Breath sounds: Normal breath sounds.   Abdominal:      General: Abdomen is flat. Bowel sounds are normal. There is no distension.      Palpations: Abdomen is soft.   Musculoskeletal:      Right lower leg: Edema present.      Left lower leg: No edema.      Comments: Right foot wound, see media   Skin:     General: Skin is warm and dry.   Neurological:      General: No focal deficit present.      Mental Status: He is alert. Mental status is at baseline.   Psychiatric:         Mood and Affect: Mood normal.              CRANIAL NERVES     CN III, IV, VI   Pupils are equal, round, and reactive to light.       Significant Labs: All pertinent labs within the past 24 hours have been reviewed.    Significant Imaging: I have reviewed all pertinent imaging results/findings within the past 24 hours.  Assessment/Plan:     Assessment & Plan  Cellulitis of multiple sites of lower extremity                - admit to inpatient  - sepsis protocol including fluids  - vanc and zoysn started  - surgery consulted, npo at midnight    Essential hypertension  Patient's blood pressure range in the last 24 hours was: BP  Min: 95/64  Max: 151/87.The patient's inpatient anti-hypertensive regimen is listed below:  Current Antihypertensives  amLODIPine tablet 5 mg, Daily, Oral  losartan tablet 50 mg, Daily, Oral  hydroCHLOROthiazide tablet 25 mg, Daily, Oral    Plan  - BP is controlled, no changes needed to their regimen    Type 2 diabetes mellitus with hyperglycemia, without long-term current use of insulin  Uncontrolled due to hyperglycemia  Hold home meds  Sliding scale    Sepsis  Patient has sepsis without organ dysfunction secondary to Skin and Soft Tissue Infection: Cellulitis. A review of systems was completed. Patient's sepsis is worsening. Will continue current treatment    Current Antibiotics    vancomycin - pharmacy to dose, pharmacy to manage frequency, Intravenous  vancomycin (VANCOCIN) 2,000  mg in 0.9% NaCl 500 mL IVPB, Every 12 hours (non-standard times), Intravenous  piperacillin-tazobactam (ZOSYN) 4.5 g in D5W 100 mL IVPB (MB+), Every 8 hours (non-standard times), Intravenous    Lactate  Recent Labs   Lab 07/12/25  1502 07/12/25  1543   LACTATE 1.1  --    POCLAC  --  1.0     Culture Data  Blood Cultures   Culture, Blood   Date Value Ref Range Status   05/03/2024 No Growth at 5 days  Final   05/03/2024 No Growth at 5 days  Final      mSOFA  MSOFA Total  Min: 0   Min taken time: 07/13/25 1300  Max: 0   Max taken time: 07/13/25 1300    Plan  - Antibiotics as listed above  - Fluid resuscitation as follows:Ideal Body Weight- The patient is obese (BMI>30) and their ideal body weight of Ideal body weight: 70.7 kg (155 lb 13.8 oz) will be used to calculate fluid bolus of 30 ml/kg.   - Trend lactate to resolution  - Follow up culture data  - Vasopressors were not needed  - The following services were consulted:Hospital Medicine and Surgical Specialties.  - see above    Foot ulcer with necrosis of muscle, right  See above    VTE Risk Mitigation (From admission, onward)           Ordered     IP VTE HIGH RISK PATIENT  Once         07/12/25 1753     Place sequential compression device  Until discontinued         07/12/25 1753                    SDOH Screening:  The patient was screened for utility difficulties, food insecurity, transport difficulties, housing insecurity, and interpersonal safety and there were no concerns identified this admission.                         Pharmacist Renal Dose Adjustment Note    Wallace Pedro is a 49 y.o. male being treated with the medication Zosyn    Patient Data:    Vital Signs (Most Recent):  Temp: 98.1 °F (36.7 °C) (07/13/25 1013)  Pulse: 100 (07/13/25 1013)  Resp: 13 (07/13/25 0955)  BP: 130/84 (07/13/25 1013)  SpO2: 96 % (07/13/25 1013) Vital Signs (72h Range):  Temp:  [98.1 °F (36.7 °C)-102.7 °F (39.3 °C)]   Pulse:  []   Resp:  [10-26]   BP: ()/(52-95)   SpO2:   [91 %-97 %]      Recent Labs   Lab 07/12/25  1455 07/13/25  0431   CREATININE 0.81 0.66*     Serum creatinine: 0.66 mg/dL (L) 07/13/25 0431  Estimated creatinine clearance: 162 mL/min (A)    Medication:Zosyn dose: 4.5 gram frequency q12h will be changed to medication:Zosyn dose:4.5 grams frequency:q8h    Pharmacist's Name: Farzana Solorio  Pharmacist's Extension: [phone]         Neela Machado MD  Department of Hospital Medicine  [facility]

## 2025-07-13 NOTE — SUBJECTIVE & OBJECTIVE
No current facility-administered medications on file prior to encounter.     Current Outpatient Medications on File Prior to Encounter   Medication Sig    ciprofloxacin HCl (CIPRO) 500 MG tablet Take 1 tablet (500 mg total) by mouth every 12 (twelve) hours.    insulin NPH-insulin regular, 70/30, (HUMULIN 70/30 U-100 INSULIN) 100 unit/mL (70-30) injection Inject 10 Units into the skin 2 (two) times daily. (Patient taking differently: Inject 10 Units into the skin 2 (two) times daily. PAP referred)    alcohol swabs (ALCOHOL PADS) PadM Apply 1 each topically as needed.    amLODIPine (NORVASC) 5 MG tablet Take 1 tablet (5 mg total) by mouth once daily.    atorvastatin (LIPITOR) 40 MG tablet Take 1 tablet (40 mg total) by mouth once daily.    insulin syringe-needle U-100 0.3 mL 29 gauge Syrg 1 Needle by Misc.(Non-Drug; Combo Route) route 2 (two) times a day.    losartan-hydrochlorothiazide 50-12.5 mg (HYZAAR) 50-12.5 mg per tablet Take 1 tablet by mouth once daily.    metFORMIN (GLUCOPHAGE) 500 MG tablet Take 1 tablet (500 mg total) by mouth 2 (two) times daily with meals.       Review of patient's allergies indicates:  No Known Allergies    Past Medical History:   Diagnosis Date    Diabetes mellitus, type 2     Hyperlipidemia     Hypertension      No past surgical history on file.  Family History       Problem Relation (Age of Onset)    Diabetes Father    Heart disease Mother    Hypertension Father          Tobacco Use    Smoking status: Every Day     Current packs/day: 0.00     Types: Vaping with nicotine, Cigarettes     Last attempt to quit: 2019     Years since quittin.5    Smokeless tobacco: Never   Substance and Sexual Activity    Alcohol use: Yes     Comment: 6 pack a day    Drug use: Never    Sexual activity: Yes     Partners: Female     Review of Systems   Constitutional: Negative.  Negative for appetite change, chills, fever and unexpected weight change.   HENT: Negative.  Negative for trouble swallowing.     Eyes: Negative.    Respiratory: Negative.  Negative for chest tightness and shortness of breath.    Cardiovascular: Negative.  Negative for chest pain.   Gastrointestinal: Negative.  Negative for abdominal distention, abdominal pain, blood in stool and nausea.   Endocrine: Negative.    Genitourinary: Negative.  Negative for hematuria.   Musculoskeletal: Negative.  Negative for back pain and myalgias.   Skin: Negative.  Negative for color change.   Allergic/Immunologic: Negative.    Neurological: Negative.  Negative for dizziness, syncope, weakness and light-headedness.   Psychiatric/Behavioral: Negative.  Negative for agitation.      Objective:     Vital Signs (Most Recent):  Temp: 98.2 °F (36.8 °C) (07/13/25 0456)  Pulse: 102 (07/13/25 0456)  Resp: 20 (07/12/25 2111)  BP: 126/69 (07/13/25 0456)  SpO2: (!) 94 % (07/13/25 0456) Vital Signs (24h Range):  Temp:  [98.2 °F (36.8 °C)-102.7 °F (39.3 °C)] 98.2 °F (36.8 °C)  Pulse:  [] 102  Resp:  [13-26] 20  SpO2:  [91 %-97 %] 94 %  BP: ()/(64-95) 126/69     Weight: 100.9 kg (222 lb 8 oz)  Body mass index is 32.86 kg/m².     Physical Exam  Constitutional:       General: He is not in acute distress.     Appearance: Normal appearance.   HENT:      Head: Normocephalic.   Cardiovascular:      Rate and Rhythm: Normal rate.   Pulmonary:      Effort: Pulmonary effort is normal. No respiratory distress.   Abdominal:      General: There is no distension.      Tenderness: There is no abdominal tenderness.   Musculoskeletal:         General: Normal range of motion.        Feet:    Feet:      Comments: Foot wound to the plantar surface with foot necrosis, foul smell, purulent drainage  Skin:     General: Skin is warm.      Coloration: Skin is not jaundiced.   Neurological:      General: No focal deficit present.      Mental Status: He is alert and oriented to person, place, and time.      Cranial Nerves: No cranial nerve deficit.                      I have reviewed all  pertinent lab results within the past 24 hours.  CBC:   Recent Labs   Lab 07/13/25  0431   WBC 14.65*   RBC 3.69*   HGB 11.9*   HCT 36.1*      MCV 97.8*   MCH 32.2*   MCHC 33.0     BMP:   Recent Labs   Lab 07/12/25  1455   *   *   K 3.8   CL 98   CO2 21*   BUN 11   CREATININE 0.81   CALCIUM 8.6   MG 1.6       Significant Diagnostics:  I have reviewed all pertinent imaging results/findings within the past 24 hours.

## 2025-07-13 NOTE — ANESTHESIA PROCEDURE NOTES
Intubation    Date/Time: 7/13/2025 8:34 AM    Performed by: Shena Goode CRNA  Authorized by: Shena Goode CRNA    Intubation:     Induction:  Intravenous    Intubated:  Postinduction    Mask Ventilation:  N/a    Attempts:  1    Attempted By:  CRNA    Difficult Airway Encountered?: No      Complications:  None    Airway Device:  Supraglottic airway/LMA    Airway Device Size:  4.0    Style/Cuff Inflation:  Cuffed    Secured at:  The lips    Placement Verified By:  Capnometry    Complicating Factors:  None    Findings Post-Intubation:  BS equal bilateral and atraumatic/condition of teeth unchanged

## 2025-07-13 NOTE — HOSPITAL COURSE
7/13  - sp OR today, bone sample sent to test of osteo, following wound cultures, likely will consult ID for recommendations if osteo, there is some interesting new recommendations for abx in osteomyelitis and will appreciate their recs in that situation  - discussed Diabetic care today, recently started on insulin, at discharge would benefit from metformin/glyburide as well and these are affordable meds in no insurance situations  - will calculate ASCVD risk as well and likely start statin if indicated     7/14  - awaiting bone and wound cultures, if positive will need IV abx  Bone cultures negative.  Stable for discharge.  Two more weeks doxy.  Refer to resident clinic to establish care.  Follow up surgery in 2 weeks

## 2025-07-13 NOTE — OP NOTE
[facility]  Surgery Department  Operative Note    SUMMARY     Date of Procedure: 7/13/2025     Procedure: Procedure(s) (LRB):  DEBRIDEMENT, RIGHT FOOT (Right)     Surgeons and Role:     * Moises Varela DO - Primary    Assisting Surgeon: None    Pre-Operative Diagnosis: Gangrene of right foot [I96]  Cellulitis of multiple sites of lower extremity [L03.119]    Post-Operative Diagnosis: Post-Op Diagnosis Codes:     * Gangrene of right foot [I96]     * Cellulitis of multiple sites of lower extremity [L03.119]    Anesthesia: General    Operative Findings (including complications, if any):  Necrotic skin, subcutaneous tissue and muscle extending down to bone; purulent drainage on the plantar surface and dorsal surface of the foot, tracking between the 3rd and 4th metatarsals    Description of Technical Procedures:  Patient was taken the operating room and placed on the operating table in the supine position.  Patient underwent general anesthesia per the anesthesia team.  The right leg was then prepped and draped in usual sterile fashion.  Patient had multiple small ulcerations which measured about 2 cm across the anterior surface of the right leg, all ulcers were cleaned and needed no debridement.  No purulent drainage coming from these.  Patient had a wound with necrotic tissue exposed on the plantar surface of the right foot.  We used electrocautery and a curette to perform a sharp excisional debridement and excise skin, necrotic fat and necrotic muscle all the way down to bone; the wound measured a proximally 3 x 3 cm; there was purulent drainage which we cultured and sent to microbiology.  We sent the necrotic tissue to pathology.  We found the abscess cavity tracked between the 3rd and 4th metatarsals and a went and had a pocket on the dorsal surface of the foot.  We made a counter incision the measured a proximally 2 x 2 cm and excise this tissue and connected the 2 cavities.  The  tract measured a proximally 7 cm.  We irrigated the cavity with Dakin solution and suctioned clear, bleeding controlled electrocautery.  We passed a quarter-inch Penrose drain through the area and secured it to the plantar surface of the foot and dorsal surface of the foot with 2-0 nylon sutures.  We then packed the wound with gauze and wrapped with 4 x 4 gauze, Kerlix and Ace wrap.  Patient was awakened, extubated and taken the PACU in stable condition.  Patient tolerated procedure well        Estimated Blood Loss (EBL): 10cc           Implants: * No implants in log *    Specimens:   Specimen (24h ago, onward)       Start     Ordered    07/13/25 0911  Surgical Pathology  RELEASE UPON ORDERING         07/13/25 0911                   ID Type Source Tests Collected by Time Destination   1 : necrotic tissue and muscle Tissue Foot, Right SURGICAL PATHOLOGY Moises Varela, DO 7/13/2025 0856               Condition: Good    Disposition: PACU - hemodynamically stable.    Attestation: Op Note Attestation: I was physically present and scrubbed for the entire procedure.

## 2025-07-13 NOTE — HPI
48 yo male with pmh of T2DM presents to the ER for non-healing right foot wound with fever, chills, and pain for the last week. Wound has been present for 3 mohts but progressed in the 2 weeks. He also has additional wounds on the leg which are noted. Discussed with ED provider and agree with admission to hospital for abx and surgical consult.

## 2025-07-13 NOTE — ASSESSMENT & PLAN NOTE
- admit to inpatient  - sepsis protocol including fluids  - vanc and zoysn started  - surgery consulted, npo at midnight    7/13 - sp OR today, bone sample sent to test of osteo, following wound cultures, likely will consult ID for recommendations if osteo, there is some interesting new recommendations for abx in osteomyelitis and will appreciate their recs in that situation

## 2025-07-13 NOTE — CONSULTS
[facility]  General Surgery  Consult Note    Patient Name: Wallace Pedro  MRN: [id number]  Code Status: Full Code  Admission Date: 7/12/2025  Hospital Length of Stay: 1 days  Attending Physician: Neela Machado MD  Primary Care Provider: Jamilah Primary Doctor    Patient information was obtained from patient and ER records.     Inpatient consult to General Surgery  Consult performed by: Moises Varela DO  Consult ordered by: Neela Machado MD        Subjective:     Principal Problem: Foot ulcer with necrosis of muscle, right    History of Present Illness: 49-year-old  male presents to the hospital with complaints of a right foot wound.  Patient states the wound has been present for about 3 months but has progressively gotten worse.  Past medical history of diabetes.  Patient also had several ulcers on the leg which he has been trying to take care of at home.  Leukocytosis of 14,000; right foot x-ray shows hallux valgus deformity. No acute fracture or traumatic subluxation. Soft tissue edema of the forefoot. Cellulitis is not excluded. No mass or foreign body is identified. No soft tissue air is detected. No osseous destruction.  Admitted to the hospital for IV antibiotics, general surgery to evaluate for debridement.  Denies any chest pain/shortness of breath, nausea/vomiting/diarrhea, fever/chills.    No current facility-administered medications on file prior to encounter.     Current Outpatient Medications on File Prior to Encounter   Medication Sig    ciprofloxacin HCl (CIPRO) 500 MG tablet Take 1 tablet (500 mg total) by mouth every 12 (twelve) hours.    insulin NPH-insulin regular, 70/30, (HUMULIN 70/30 U-100 INSULIN) 100 unit/mL (70-30) injection Inject 10 Units into the skin 2 (two) times daily. (Patient taking differently: Inject 10 Units into the skin 2 (two) times daily. PAP referred)    alcohol swabs (ALCOHOL PADS) PadM Apply 1 each topically as needed.    amLODIPine (NORVASC)  5 MG tablet Take 1 tablet (5 mg total) by mouth once daily.    atorvastatin (LIPITOR) 40 MG tablet Take 1 tablet (40 mg total) by mouth once daily.    insulin syringe-needle U-100 0.3 mL 29 gauge Syrg 1 Needle by Misc.(Non-Drug; Combo Route) route 2 (two) times a day.    losartan-hydrochlorothiazide 50-12.5 mg (HYZAAR) 50-12.5 mg per tablet Take 1 tablet by mouth once daily.    metFORMIN (GLUCOPHAGE) 500 MG tablet Take 1 tablet (500 mg total) by mouth 2 (two) times daily with meals.       Review of patient's allergies indicates:  No Known Allergies    Past Medical History:   Diagnosis Date    Diabetes mellitus, type 2     Hyperlipidemia     Hypertension      No past surgical history on file.  Family History       Problem Relation (Age of Onset)    Diabetes Father    Heart disease Mother    Hypertension Father          Tobacco Use    Smoking status: Every Day     Current packs/day: 0.00     Types: Vaping with nicotine, Cigarettes     Last attempt to quit: 2019     Years since quittin.5    Smokeless tobacco: Never   Substance and Sexual Activity    Alcohol use: Yes     Comment: 6 pack a day    Drug use: Never    Sexual activity: Yes     Partners: Female     Review of Systems   Constitutional: Negative.  Negative for appetite change, chills, fever and unexpected weight change.   HENT: Negative.  Negative for trouble swallowing.    Eyes: Negative.    Respiratory: Negative.  Negative for chest tightness and shortness of breath.    Cardiovascular: Negative.  Negative for chest pain.   Gastrointestinal: Negative.  Negative for abdominal distention, abdominal pain, blood in stool and nausea.   Endocrine: Negative.    Genitourinary: Negative.  Negative for hematuria.   Musculoskeletal: Negative.  Negative for back pain and myalgias.   Skin: Negative.  Negative for color change.   Allergic/Immunologic: Negative.    Neurological: Negative.  Negative for dizziness, syncope, weakness and light-headedness.    Psychiatric/Behavioral: Negative.  Negative for agitation.      Objective:     Vital Signs (Most Recent):  Temp: 98.2 °F (36.8 °C) (07/13/25 0456)  Pulse: 102 (07/13/25 0456)  Resp: 20 (07/12/25 2111)  BP: 126/69 (07/13/25 0456)  SpO2: (!) 94 % (07/13/25 0456) Vital Signs (24h Range):  Temp:  [98.2 °F (36.8 °C)-102.7 °F (39.3 °C)] 98.2 °F (36.8 °C)  Pulse:  [] 102  Resp:  [13-26] 20  SpO2:  [91 %-97 %] 94 %  BP: ()/(64-95) 126/69     Weight: 100.9 kg (222 lb 8 oz)  Body mass index is 32.86 kg/m².     Physical Exam  Constitutional:       General: He is not in acute distress.     Appearance: Normal appearance.   HENT:      Head: Normocephalic.   Cardiovascular:      Rate and Rhythm: Normal rate.   Pulmonary:      Effort: Pulmonary effort is normal. No respiratory distress.   Abdominal:      General: There is no distension.      Tenderness: There is no abdominal tenderness.   Musculoskeletal:         General: Normal range of motion.        Feet:    Feet:      Comments: Foot wound to the plantar surface with foot necrosis, foul smell, purulent drainage  Skin:     General: Skin is warm.      Coloration: Skin is not jaundiced.   Neurological:      General: No focal deficit present.      Mental Status: He is alert and oriented to person, place, and time.      Cranial Nerves: No cranial nerve deficit.                      I have reviewed all pertinent lab results within the past 24 hours.  CBC:   Recent Labs   Lab 07/13/25  0431   WBC 14.65*   RBC 3.69*   HGB 11.9*   HCT 36.1*      MCV 97.8*   MCH 32.2*   MCHC 33.0     BMP:   Recent Labs   Lab 07/12/25  1455   *   *   K 3.8   CL 98   CO2 21*   BUN 11   CREATININE 0.81   CALCIUM 8.6   MG 1.6       Significant Diagnostics:  I have reviewed all pertinent imaging results/findings within the past 24 hours.    Assessment/Plan:     * Foot ulcer with necrosis of muscle, right  To the OR for debridement of right foot.      Risks and benefits  explained with the patient including risks for infection, bleeding, injury to surrounding structures, hematoma/seroma formation with need for possible evacuation, possible open.  The patient verbalized understanding, agrees and wishes to proceed with surgery.      VTE Risk Mitigation (From admission, onward)           Ordered     IP VTE HIGH RISK PATIENT  Once         07/12/25 1753     Place sequential compression device  Until discontinued         07/12/25 1753                    Thank you for your consult. I will follow-up with patient. Please contact us if you have any additional questions.    Moises Hobson, DO  General Surgery  [facility]

## 2025-07-13 NOTE — PLAN OF CARE
Problem: Adult Inpatient Plan of Care  Goal: Plan of Care Review  Outcome: Progressing  Goal: Patient-Specific Goal (Individualized)  Outcome: Progressing  Goal: Absence of Hospital-Acquired Illness or Injury  Outcome: Progressing  Goal: Optimal Comfort and Wellbeing  Outcome: Progressing     Problem: Diabetes Comorbidity  Goal: Blood Glucose Level Within Targeted Range  Outcome: Progressing     Problem: Sepsis/Septic Shock  Goal: Optimal Coping  Outcome: Progressing  Goal: Absence of Bleeding  Outcome: Progressing

## 2025-07-13 NOTE — NURSING
0925 pt to pacu pt resp reg and non labored pt sleepy pt sao2 96% on 7l fm pt dsg d/I to R foot no bleeding noted pt pain level 0 will monitor    0940 pt vs stable pt resting well pt sao2 94% on ra  pt accucheck 221 pt voices no complaints pain level 0 will monitor    0955 pt vs stable pt sao2  94% on ra pt dsg d/I to R foot pain level 0 orders to release to room per md    1010 pt released to  Cornelio ng b/p 120/84 pulse 101 resp 16 sao2 96% on ra temp 98.1 oral pt awake and alert family at bedside

## 2025-07-13 NOTE — ASSESSMENT & PLAN NOTE
Met with patient  Explained role of care management  Patient lives in a two story home with her daughter, she uses both floors  She is independent adls', uses a cane/walker to ambulate, provides own meals, daughter  transports  DME - RW, cane  Past services - [facility], [facility]  Pharmacy of choice is [facility] in [facility]  Wait PT/OT evaluation  Uncontrolled due to hyperglycemia  Hold home meds  Sliding scale

## 2025-07-13 NOTE — ASSESSMENT & PLAN NOTE
To the OR for debridement of right foot.      Risks and benefits explained with the patient including risks for infection, bleeding, injury to surrounding structures, hematoma/seroma formation with need for possible evacuation, possible open.  The patient verbalized understanding, agrees and wishes to proceed with surgery.

## 2025-07-13 NOTE — PROGRESS NOTES
[facility]  [facility] Medicine  Progress Note    Patient Name: Wallace Pedro  MRN: [id number]  Patient Class: IP- Inpatient   Admission Date: 7/12/2025  Length of Stay: 1 days  Attending Physician: Neela Machado MD  Primary Care Provider: Jamilah, Primary Doctor        Subjective     Principal Problem:Cellulitis of multiple sites of lower extremity        HPI:  48 yo male with pmh of T2DM presents to the ER for non-healing right foot wound with fever, chills, and pain for the last week. Wound has been present for 3 mohts but progressed in the 2 weeks. He also has additional wounds on the leg which are noted. Discussed with ED provider and agree with admission to hospital for abx and surgical consult.    Overview/Hospital Course:  7/13  - sp OR today, bone sample sent to test of osteo, following wound cultures, likely will consult ID for recommendations if osteo, there is some interesting new recommendations for abx in osteomyelitis and will appreciate their recs in that situation  - discussed Diabetic care today, recently started on insulin, at discharge would benefit from metformin/glyburide as well and these are affordable meds in no insurance situations  - will calculate ASCVD risk as well and likely start statin if indicated     Past Medical History:   Diagnosis Date    Diabetes mellitus, type 2     Hyperlipidemia     Hypertension        History reviewed. No pertinent surgical history.    Review of patient's allergies indicates:  No Known Allergies    No current facility-administered medications on file prior to encounter.     Current Outpatient Medications on File Prior to Encounter   Medication Sig    ciprofloxacin HCl (CIPRO) 500 MG tablet Take 1 tablet (500 mg total) by mouth every 12 (twelve) hours.    insulin NPH-insulin regular, 70/30, (HUMULIN 70/30 U-100 INSULIN) 100 unit/mL (70-30) injection Inject 10 Units into the skin 2 (two) times daily. (Patient taking differently: Inject 10 Units  into the skin 2 (two) times daily. PAP referred)    alcohol swabs (ALCOHOL PADS) PadM Apply 1 each topically as needed.    amLODIPine (NORVASC) 5 MG tablet Take 1 tablet (5 mg total) by mouth once daily.    atorvastatin (LIPITOR) 40 MG tablet Take 1 tablet (40 mg total) by mouth once daily.    insulin syringe-needle U-100 0.3 mL 29 gauge Syrg 1 Needle by Misc.(Non-Drug; Combo Route) route 2 (two) times a day.    losartan-hydrochlorothiazide 50-12.5 mg (HYZAAR) 50-12.5 mg per tablet Take 1 tablet by mouth once daily.    metFORMIN (GLUCOPHAGE) 500 MG tablet Take 1 tablet (500 mg total) by mouth 2 (two) times daily with meals.     Family History       Problem Relation (Age of Onset)    Diabetes Father    Heart disease Mother    Hypertension Father          Tobacco Use    Smoking status: Every Day     Current packs/day: 0.00     Types: Vaping with nicotine, Cigarettes     Last attempt to quit: 2019     Years since quittin.5    Smokeless tobacco: Never   Substance and Sexual Activity    Alcohol use: Yes     Comment: 6 pack a day    Drug use: Never    Sexual activity: Yes     Partners: Female     Review of Systems   All other systems reviewed and are negative.    Objective:     Vital Signs (Most Recent):  Temp: 98.7 °F (37.1 °C) (25 1145)  Pulse: 102 (25 1145)  Resp: 20 (25 1145)  BP: 128/83 (25 1145)  SpO2: (!) 94 % (25 1145) Vital Signs (24h Range):  Temp:  [98.1 °F (36.7 °C)-102.3 °F (39.1 °C)] 98.7 °F (37.1 °C)  Pulse:  [] 102  Resp:  [10-20] 20  SpO2:  [91 %-97 %] 94 %  BP: ()/(52-95) 128/83     Weight: 100.9 kg (222 lb 8 oz)  Body mass index is 32.86 kg/m².     Physical Exam  Vitals and nursing note reviewed.   Constitutional:       Appearance: He is ill-appearing.   HENT:      Mouth/Throat:      Mouth: Mucous membranes are dry.   Eyes:      Pupils: Pupils are equal, round, and reactive to light.   Neck:      Vascular: No carotid bruit.   Cardiovascular:      Rate and  Rhythm: Regular rhythm. Tachycardia present.      Heart sounds: No murmur heard.  Pulmonary:      Effort: Pulmonary effort is normal.      Breath sounds: Normal breath sounds.   Abdominal:      General: Abdomen is flat. Bowel sounds are normal. There is no distension.      Palpations: Abdomen is soft.   Musculoskeletal:      Right lower leg: Edema present.      Left lower leg: No edema.      Comments: Right foot wound, see media   Skin:     General: Skin is warm and dry.   Neurological:      General: No focal deficit present.      Mental Status: He is alert. Mental status is at baseline.   Psychiatric:         Mood and Affect: Mood normal.              CRANIAL NERVES     CN III, IV, VI   Pupils are equal, round, and reactive to light.       Significant Labs: All pertinent labs within the past 24 hours have been reviewed.    Significant Imaging: I have reviewed all pertinent imaging results/findings within the past 24 hours.      Assessment & Plan  Cellulitis of multiple sites of lower extremity                - admit to inpatient  - sepsis protocol including fluids  - vanc and zoysn started  - surgery consulted, npo at midnight    7/13 - sp OR today, bone sample sent to test of osteo, following wound cultures, likely will consult ID for recommendations if osteo, there is some interesting new recommendations for abx in osteomyelitis and will appreciate their recs in that situation    Essential hypertension  Patient's blood pressure range in the last 24 hours was: BP  Min: 95/64  Max: 137/95.The patient's inpatient anti-hypertensive regimen is listed below:  Current Antihypertensives  amLODIPine tablet 5 mg, Daily, Oral  losartan tablet 50 mg, Daily, Oral  hydroCHLOROthiazide tablet 25 mg, Daily, Oral    Plan  - BP is controlled, no changes needed to their regimen    Type 2 diabetes mellitus with hyperglycemia, without long-term current use of insulin  Uncontrolled due to hyperglycemia  Hold home meds  Sliding  scale    Sepsis  Patient has sepsis without organ dysfunction secondary to Skin and Soft Tissue Infection: Cellulitis. A review of systems was completed. Patient's sepsis is worsening. Will continue current treatment    Current Antibiotics    vancomycin - pharmacy to dose, pharmacy to manage frequency, Intravenous  vancomycin (VANCOCIN) 2,000 mg in 0.9% NaCl 500 mL IVPB, Every 12 hours (non-standard times), Intravenous  piperacillin-tazobactam (ZOSYN) 4.5 g in D5W 100 mL IVPB (MB+), Every 8 hours (non-standard times), Intravenous    Lactate  Recent Labs   Lab 07/12/25  1502 07/12/25  1543   LACTATE 1.1  --    POCLAC  --  1.0     Culture Data  Blood Cultures   Culture, Blood   Date Value Ref Range Status   05/03/2024 No Growth at 5 days  Final   05/03/2024 No Growth at 5 days  Final      mSOFA  MSOFA Total  Min: 0   Min taken time: 07/13/25 1701  Max: 0   Max taken time: 07/13/25 1701    Plan  - Antibiotics as listed above  - Fluid resuscitation as follows:Ideal Body Weight- The patient is obese (BMI>30) and their ideal body weight of Ideal body weight: 70.7 kg (155 lb 13.8 oz) will be used to calculate fluid bolus of 30 ml/kg.   - Trend lactate to resolution  - Follow up culture data  - Vasopressors were not needed  - The following services were consulted:Hospital Medicine and Surgical Specialties.  - see above    Foot ulcer with necrosis of muscle, right  See above    VTE Risk Mitigation (From admission, onward)           Ordered     IP VTE HIGH RISK PATIENT  Once         07/12/25 1753     Place sequential compression device  Until discontinued         07/12/25 1753                    Discharge Planning   NIDHI:      Code Status: Full Code   Medical Readiness for Discharge Date:                            Neela Machado MD  Department of Hospital Medicine   [facility]

## 2025-07-13 NOTE — ANESTHESIA PREPROCEDURE EVALUATION
07/13/2025  Wallace Pedro is a 49 y.o., male.      Pre-op Assessment    I have reviewed the Patient Summary Reports.     I have reviewed the Nursing Notes. I have reviewed the NPO Status.   I have reviewed the Medications.     Review of Systems  Anesthesia Hx:             Denies Family Hx of Anesthesia complications.    Denies Personal Hx of Anesthesia complications.                    Social:  Vaping       Cardiovascular:     Hypertension                                          Endocrine:  Diabetes, poorly controlled, using insulin         Obesity / BMI > 30  Dermatological:   Gangrene of right foot [I96]       Cellulitis of multiple sites of lower extremity     Foot ulcer with necrosis of muscle, right         Physical Exam  General: Well nourished, Cooperative, Alert and Oriented    Airway:  Mallampati: II / II  Mouth Opening: Normal  TM Distance: Normal  Neck ROM: Normal ROM    Dental:  Intact    Chest/Lungs:  Clear to auscultation    Heart:  Rate: Normal  Rhythm: Regular Rhythm  Sounds: Normal        Chemistry        Component Value Date/Time     (L) 07/13/2025 0431    K 3.6 07/13/2025 0431     07/13/2025 0431    CO2 20 (L) 07/13/2025 0431    BUN 8 (L) 07/13/2025 0431    CREATININE 0.66 (L) 07/13/2025 0431     (H) 07/13/2025 0431        Component Value Date/Time    CALCIUM 8.1 (L) 07/13/2025 0431    ALKPHOS 89 07/13/2025 0431    AST 10 (L) 07/13/2025 0431    ALT 7 07/13/2025 0431    BILITOT 0.5 07/13/2025 0431        Lab Results   Component Value Date    WBC 14.65 (H) 07/13/2025    HGB 11.9 (L) 07/13/2025    HCT 36.1 (L) 07/13/2025     07/13/2025     No results found for this or any previous visit.  No results found for this or any previous visit.        Anesthesia Plan  Type of Anesthesia, risks & benefits discussed:    Anesthesia Type: Gen Supraglottic Airway  Intra-op  Monitoring Plan: Standard ASA Monitors  Post Op Pain Control Plan: multimodal analgesia  Induction:  IV  Airway Plan: Direct  Informed Consent: Informed consent signed with the Patient and all parties understand the risks and agree with anesthesia plan.  All questions answered.   ASA Score: 3  Day of Surgery Review of History & Physical: H&P Update referred to the surgeon/provider.I have interviewed and examined the patient. I have reviewed the patient's H&P dated: There are no significant changes.     Ready For Surgery From Anesthesia Perspective.     .

## 2025-07-13 NOTE — HPI
49-year-old  male presents to the hospital with complaints of a right foot wound.  Patient states the wound has been present for about 3 months but has progressively gotten worse.  Past medical history of diabetes.  Patient also had several ulcers on the leg which he has been trying to take care of at home.  Leukocytosis of 14,000; right foot x-ray shows hallux valgus deformity. No acute fracture or traumatic subluxation. Soft tissue edema of the forefoot. Cellulitis is not excluded. No mass or foreign body is identified. No soft tissue air is detected. No osseous destruction.  Admitted to the hospital for IV antibiotics, general surgery to evaluate for debridement.  Denies any chest pain/shortness of breath, nausea/vomiting/diarrhea, fever/chills.

## 2025-07-13 NOTE — PROGRESS NOTES
Pharmacist Renal Dose Adjustment Note    Wallace Pedro is a 49 y.o. male being treated with the medication Zosyn    Patient Data:    Vital Signs (Most Recent):  Temp: 98.1 °F (36.7 °C) (07/13/25 1013)  Pulse: 100 (07/13/25 1013)  Resp: 13 (07/13/25 0955)  BP: 130/84 (07/13/25 1013)  SpO2: 96 % (07/13/25 1013) Vital Signs (72h Range):  Temp:  [98.1 °F (36.7 °C)-102.7 °F (39.3 °C)]   Pulse:  []   Resp:  [10-26]   BP: ()/(52-95)   SpO2:  [91 %-97 %]      Recent Labs   Lab 07/12/25  1455 07/13/25  0431   CREATININE 0.81 0.66*     Serum creatinine: 0.66 mg/dL (L) 07/13/25 0431  Estimated creatinine clearance: 162 mL/min (A)    Medication:Zosyn dose: 4.5 gram frequency q12h will be changed to medication:Zosyn dose:4.5 grams frequency:q8h    Pharmacist's Name: Farzana Solorio  Pharmacist's Extension: [phone]

## 2025-07-13 NOTE — ASSESSMENT & PLAN NOTE
Patient has sepsis without organ dysfunction secondary to Skin and Soft Tissue Infection: Cellulitis. A review of systems was completed. Patient's sepsis is worsening. Will continue current treatment    Current Antibiotics    vancomycin - pharmacy to dose, pharmacy to manage frequency, Intravenous  vancomycin (VANCOCIN) 2,000 mg in 0.9% NaCl 500 mL IVPB, Every 12 hours (non-standard times), Intravenous  piperacillin-tazobactam (ZOSYN) 4.5 g in D5W 100 mL IVPB (MB+), Every 8 hours (non-standard times), Intravenous    Lactate  Recent Labs   Lab 07/12/25  1502 07/12/25  1543   LACTATE 1.1  --    POCLAC  --  1.0     Culture Data  Blood Cultures   Culture, Blood   Date Value Ref Range Status   05/03/2024 No Growth at 5 days  Final   05/03/2024 No Growth at 5 days  Final      mSOFA  MSOFA Total  Min: 0   Min taken time: 07/13/25 1300  Max: 0   Max taken time: 07/13/25 1300    Plan  - Antibiotics as listed above  - Fluid resuscitation as follows:Ideal Body Weight- The patient is obese (BMI>30) and their ideal body weight of Ideal body weight: 70.7 kg (155 lb 13.8 oz) will be used to calculate fluid bolus of 30 ml/kg.   - Trend lactate to resolution  - Follow up culture data  - Vasopressors were not needed  - The following services were consulted:Hospital Medicine and Surgical Specialties.  - see above

## 2025-07-13 NOTE — SUBJECTIVE & OBJECTIVE
Past Medical History:   Diagnosis Date    Diabetes mellitus, type 2     Hyperlipidemia     Hypertension        History reviewed. No pertinent surgical history.    Review of patient's allergies indicates:  No Known Allergies    No current facility-administered medications on file prior to encounter.     Current Outpatient Medications on File Prior to Encounter   Medication Sig    ciprofloxacin HCl (CIPRO) 500 MG tablet Take 1 tablet (500 mg total) by mouth every 12 (twelve) hours.    insulin NPH-insulin regular, 70/30, (HUMULIN 70/30 U-100 INSULIN) 100 unit/mL (70-30) injection Inject 10 Units into the skin 2 (two) times daily. (Patient taking differently: Inject 10 Units into the skin 2 (two) times daily. PAP referred)    alcohol swabs (ALCOHOL PADS) PadM Apply 1 each topically as needed.    amLODIPine (NORVASC) 5 MG tablet Take 1 tablet (5 mg total) by mouth once daily.    atorvastatin (LIPITOR) 40 MG tablet Take 1 tablet (40 mg total) by mouth once daily.    insulin syringe-needle U-100 0.3 mL 29 gauge Syrg 1 Needle by Misc.(Non-Drug; Combo Route) route 2 (two) times a day.    losartan-hydrochlorothiazide 50-12.5 mg (HYZAAR) 50-12.5 mg per tablet Take 1 tablet by mouth once daily.    metFORMIN (GLUCOPHAGE) 500 MG tablet Take 1 tablet (500 mg total) by mouth 2 (two) times daily with meals.     Family History       Problem Relation (Age of Onset)    Diabetes Father    Heart disease Mother    Hypertension Father          Tobacco Use    Smoking status: Every Day     Current packs/day: 0.00     Types: Vaping with nicotine, Cigarettes     Last attempt to quit: 2019     Years since quittin.5    Smokeless tobacco: Never   Substance and Sexual Activity    Alcohol use: Yes     Comment: 6 pack a day    Drug use: Never    Sexual activity: Yes     Partners: Female     Review of Systems   All other systems reviewed and are negative.    Objective:     Vital Signs (Most Recent):  Temp: 98.7 °F (37.1 °C) (25  1145)  Pulse: 102 (07/13/25 1145)  Resp: 20 (07/13/25 1145)  BP: 128/83 (07/13/25 1145)  SpO2: (!) 94 % (07/13/25 1145) Vital Signs (24h Range):  Temp:  [98.1 °F (36.7 °C)-102.7 °F (39.3 °C)] 98.7 °F (37.1 °C)  Pulse:  [] 102  Resp:  [10-26] 20  SpO2:  [91 %-97 %] 94 %  BP: ()/(52-95) 128/83     Weight: 100.9 kg (222 lb 8 oz)  Body mass index is 32.86 kg/m².     Physical Exam  Vitals and nursing note reviewed.   Constitutional:       Appearance: He is ill-appearing.   HENT:      Mouth/Throat:      Mouth: Mucous membranes are dry.   Eyes:      Pupils: Pupils are equal, round, and reactive to light.   Neck:      Vascular: No carotid bruit.   Cardiovascular:      Rate and Rhythm: Regular rhythm. Tachycardia present.      Heart sounds: No murmur heard.  Pulmonary:      Effort: Pulmonary effort is normal.      Breath sounds: Normal breath sounds.   Abdominal:      General: Abdomen is flat. Bowel sounds are normal. There is no distension.      Palpations: Abdomen is soft.   Musculoskeletal:      Right lower leg: Edema present.      Left lower leg: No edema.      Comments: Right foot wound, see media   Skin:     General: Skin is warm and dry.   Neurological:      General: No focal deficit present.      Mental Status: He is alert. Mental status is at baseline.   Psychiatric:         Mood and Affect: Mood normal.              CRANIAL NERVES     CN III, IV, VI   Pupils are equal, round, and reactive to light.       Significant Labs: All pertinent labs within the past 24 hours have been reviewed.    Significant Imaging: I have reviewed all pertinent imaging results/findings within the past 24 hours.

## 2025-07-13 NOTE — PLAN OF CARE
Problem: Adult Inpatient Plan of Care  Goal: Plan of Care Review  Outcome: Progressing     Problem: Sepsis/Septic Shock  Goal: Optimal Nutrition Intake  Outcome: Progressing

## 2025-07-13 NOTE — ASSESSMENT & PLAN NOTE
- admit to inpatient  - sepsis protocol including fluids  - vanc and zoysn started  - surgery consulted, npo at midnight

## 2025-07-14 LAB
ALBUMIN SERPL BCP-MCNC: 2 G/DL (ref 3.5–5)
ALBUMIN/GLOB SERPL: 0.6 {RATIO}
ALP SERPL-CCNC: 87 U/L (ref 40–150)
ALT SERPL W P-5'-P-CCNC: 10 U/L
ANION GAP SERPL CALCULATED.3IONS-SCNC: 9 MMOL/L (ref 7–16)
AST SERPL W P-5'-P-CCNC: 12 U/L (ref 11–45)
BASOPHILS # BLD AUTO: 0.03 K/UL (ref 0–0.2)
BASOPHILS NFR BLD AUTO: 0.3 % (ref 0–1)
BILIRUB SERPL-MCNC: 0.4 MG/DL
BUN SERPL-MCNC: 8 MG/DL (ref 9–21)
BUN/CREAT SERPL: 11 (ref 6–20)
CALCIUM SERPL-MCNC: 7.4 MG/DL (ref 8.4–10.2)
CHLORIDE SERPL-SCNC: 100 MMOL/L (ref 98–107)
CO2 SERPL-SCNC: 26 MMOL/L (ref 22–29)
CREAT SERPL-MCNC: 0.71 MG/DL (ref 0.72–1.25)
DIFFERENTIAL METHOD BLD: ABNORMAL
EGFR (NO RACE VARIABLE) (RUSH/TITUS): 114 ML/MIN/1.73M2
EOSINOPHIL # BLD AUTO: 0.14 K/UL (ref 0–0.5)
EOSINOPHIL NFR BLD AUTO: 1.3 % (ref 1–4)
ERYTHROCYTE [DISTWIDTH] IN BLOOD BY AUTOMATED COUNT: 11.8 % (ref 11.5–14.5)
GLOBULIN SER-MCNC: 3.1 G/DL (ref 2–4)
GLUCOSE SERPL-MCNC: 226 MG/DL (ref 70–105)
GLUCOSE SERPL-MCNC: 258 MG/DL (ref 70–105)
GLUCOSE SERPL-MCNC: 266 MG/DL (ref 74–100)
GLUCOSE SERPL-MCNC: 293 MG/DL (ref 70–105)
GLUCOSE SERPL-MCNC: 350 MG/DL (ref 70–105)
HCT VFR BLD AUTO: 33.3 % (ref 40–54)
HGB BLD-MCNC: 11.1 G/DL (ref 13.5–18)
IMM GRANULOCYTES # BLD AUTO: 0.1 K/UL (ref 0–0.04)
IMM GRANULOCYTES NFR BLD: 0.9 % (ref 0–0.4)
LYMPHOCYTES # BLD AUTO: 1.2 K/UL (ref 1–4.8)
LYMPHOCYTES NFR BLD AUTO: 11.1 % (ref 27–41)
MAGNESIUM SERPL-MCNC: 2 MG/DL (ref 1.6–2.6)
MCH RBC QN AUTO: 31.5 PG (ref 27–31)
MCHC RBC AUTO-ENTMCNC: 33.3 G/DL (ref 32–36)
MCV RBC AUTO: 94.6 FL (ref 80–96)
MONOCYTES # BLD AUTO: 1.04 K/UL (ref 0–0.8)
MONOCYTES NFR BLD AUTO: 9.6 % (ref 2–6)
MPC BLD CALC-MCNC: 9.8 FL (ref 9.4–12.4)
NEUTROPHILS # BLD AUTO: 8.31 K/UL (ref 1.8–7.7)
NEUTROPHILS NFR BLD AUTO: 76.8 % (ref 53–65)
NRBC # BLD AUTO: 0 X10E3/UL
NRBC, AUTO (.00): 0 %
PHOSPHATE SERPL-MCNC: 2.4 MG/DL (ref 2.3–4.7)
PLATELET # BLD AUTO: 203 K/UL (ref 150–400)
POTASSIUM SERPL-SCNC: 3.6 MMOL/L (ref 3.5–5.1)
PROT SERPL-MCNC: 5.1 G/DL (ref 6.4–8.3)
RBC # BLD AUTO: 3.52 M/UL (ref 4.6–6.2)
SODIUM SERPL-SCNC: 131 MMOL/L (ref 136–145)
VANCOMYCIN TROUGH SERPL-MCNC: 6.2 ΜG/ML (ref 15–20)
WBC # BLD AUTO: 10.82 K/UL (ref 4.5–11)

## 2025-07-14 PROCEDURE — 80053 COMPREHEN METABOLIC PANEL: CPT | Performed by: STUDENT IN AN ORGANIZED HEALTH CARE EDUCATION/TRAINING PROGRAM

## 2025-07-14 PROCEDURE — 94761 N-INVAS EAR/PLS OXIMETRY MLT: CPT

## 2025-07-14 PROCEDURE — 84100 ASSAY OF PHOSPHORUS: CPT | Performed by: STUDENT IN AN ORGANIZED HEALTH CARE EDUCATION/TRAINING PROGRAM

## 2025-07-14 PROCEDURE — 82962 GLUCOSE BLOOD TEST: CPT

## 2025-07-14 PROCEDURE — 99232 SBSQ HOSP IP/OBS MODERATE 35: CPT | Mod: ,,,

## 2025-07-14 PROCEDURE — 36415 COLL VENOUS BLD VENIPUNCTURE: CPT | Performed by: STUDENT IN AN ORGANIZED HEALTH CARE EDUCATION/TRAINING PROGRAM

## 2025-07-14 PROCEDURE — 25000003 PHARM REV CODE 250

## 2025-07-14 PROCEDURE — 11000001 HC ACUTE MED/SURG PRIVATE ROOM

## 2025-07-14 PROCEDURE — 27000207 HC ISOLATION

## 2025-07-14 PROCEDURE — 25000003 PHARM REV CODE 250: Performed by: STUDENT IN AN ORGANIZED HEALTH CARE EDUCATION/TRAINING PROGRAM

## 2025-07-14 PROCEDURE — 80202 ASSAY OF VANCOMYCIN: CPT | Performed by: STUDENT IN AN ORGANIZED HEALTH CARE EDUCATION/TRAINING PROGRAM

## 2025-07-14 PROCEDURE — 63600175 PHARM REV CODE 636 W HCPCS: Performed by: STUDENT IN AN ORGANIZED HEALTH CARE EDUCATION/TRAINING PROGRAM

## 2025-07-14 PROCEDURE — 63600175 PHARM REV CODE 636 W HCPCS

## 2025-07-14 PROCEDURE — 85025 COMPLETE CBC W/AUTO DIFF WBC: CPT | Performed by: STUDENT IN AN ORGANIZED HEALTH CARE EDUCATION/TRAINING PROGRAM

## 2025-07-14 PROCEDURE — 83735 ASSAY OF MAGNESIUM: CPT | Performed by: STUDENT IN AN ORGANIZED HEALTH CARE EDUCATION/TRAINING PROGRAM

## 2025-07-14 RX ORDER — INSULIN GLARGINE 100 [IU]/ML
40 INJECTION, SOLUTION SUBCUTANEOUS NIGHTLY
Status: DISCONTINUED | OUTPATIENT
Start: 2025-07-14 | End: 2025-07-15 | Stop reason: HOSPADM

## 2025-07-14 RX ADMIN — HYDROCODONE BITARTRATE AND ACETAMINOPHEN 1 TABLET: 10; 325 TABLET ORAL at 04:07

## 2025-07-14 RX ADMIN — LOSARTAN POTASSIUM 50 MG: 50 TABLET, FILM COATED ORAL at 08:07

## 2025-07-14 RX ADMIN — INSULIN ASPART 1 UNITS: 100 INJECTION, SOLUTION INTRAVENOUS; SUBCUTANEOUS at 09:07

## 2025-07-14 RX ADMIN — PIPERACILLIN SODIUM AND TAZOBACTAM SODIUM 4.5 G: 4; .5 INJECTION, POWDER, LYOPHILIZED, FOR SOLUTION INTRAVENOUS at 12:07

## 2025-07-14 RX ADMIN — ATORVASTATIN CALCIUM 40 MG: 40 TABLET, FILM COATED ORAL at 08:07

## 2025-07-14 RX ADMIN — MUPIROCIN: 20 OINTMENT TOPICAL at 09:07

## 2025-07-14 RX ADMIN — HYDROCODONE BITARTRATE AND ACETAMINOPHEN 1 TABLET: 10; 325 TABLET ORAL at 05:07

## 2025-07-14 RX ADMIN — SODIUM CHLORIDE 2000 MG: 9 INJECTION, SOLUTION INTRAVENOUS at 05:07

## 2025-07-14 RX ADMIN — AMLODIPINE BESYLATE 5 MG: 5 TABLET ORAL at 08:07

## 2025-07-14 RX ADMIN — HYDROCHLOROTHIAZIDE 25 MG: 25 TABLET ORAL at 08:07

## 2025-07-14 RX ADMIN — PIPERACILLIN SODIUM AND TAZOBACTAM SODIUM 4.5 G: 4; .5 INJECTION, POWDER, LYOPHILIZED, FOR SOLUTION INTRAVENOUS at 09:07

## 2025-07-14 RX ADMIN — INSULIN ASPART 3 UNITS: 100 INJECTION, SOLUTION INTRAVENOUS; SUBCUTANEOUS at 05:07

## 2025-07-14 RX ADMIN — SODIUM CHLORIDE 2000 MG: 9 INJECTION, SOLUTION INTRAVENOUS at 03:07

## 2025-07-14 RX ADMIN — PIPERACILLIN SODIUM AND TAZOBACTAM SODIUM 4.5 G: 4; .5 INJECTION, POWDER, LYOPHILIZED, FOR SOLUTION INTRAVENOUS at 04:07

## 2025-07-14 RX ADMIN — MUPIROCIN: 20 OINTMENT TOPICAL at 08:07

## 2025-07-14 RX ADMIN — INSULIN GLARGINE 40 UNITS: 100 INJECTION, SOLUTION SUBCUTANEOUS at 09:07

## 2025-07-14 RX ADMIN — INSULIN ASPART 4 UNITS: 100 INJECTION, SOLUTION INTRAVENOUS; SUBCUTANEOUS at 12:07

## 2025-07-14 RX ADMIN — SODIUM CHLORIDE: 9 INJECTION, SOLUTION INTRAVENOUS at 12:07

## 2025-07-14 RX ADMIN — Medication 6 MG: at 09:07

## 2025-07-14 NOTE — PROGRESS NOTES
[facility] - Orthopedic  General Surgery  Progress Note    Subjective:     History of Present Illness:  49-year-old  male presents to the hospital with complaints of a right foot wound.  Patient states the wound has been present for about 3 months but has progressively gotten worse.  Past medical history of diabetes.  Patient also had several ulcers on the leg which he has been trying to take care of at home.  Leukocytosis of 14,000; right foot x-ray shows hallux valgus deformity. No acute fracture or traumatic subluxation. Soft tissue edema of the forefoot. Cellulitis is not excluded. No mass or foreign body is identified. No soft tissue air is detected. No osseous destruction.  Admitted to the hospital for IV antibiotics, general surgery to evaluate for debridement.  Denies any chest pain/shortness of breath, nausea/vomiting/diarrhea, fever/chills.    Post-Op Info:  Procedure(s) (LRB):  DEBRIDEMENT, FOOT (Right)   1 Day Post-Op     Interval History:  Stable, no acute events overnight.  Pain improved and foot.  Denies any chest pain/shortness of breath, nausea/vomiting/diarrhea, fever/chills.    Medications:  Continuous Infusions:   0.9% NaCl   Intravenous Continuous 125 mL/hr at 07/14/25 0546 Rate Verify at 07/14/25 0546     Scheduled Meds:   amLODIPine  5 mg Oral Daily    atorvastatin  40 mg Oral Daily    hydroCHLOROthiazide  25 mg Oral Daily    insulin glargine U-100  20 Units Subcutaneous QHS    losartan  50 mg Oral Daily    mupirocin   Nasal BID    piperacillin-tazobactam (Zosyn) IV (PEDS and ADULTS) (extended infusion is not appropriate)  4.5 g Intravenous Q8H    vancomycin (VANCOCIN) IV (PEDS and ADULTS)  2,000 mg Intravenous Q12H     PRN Meds:  Current Facility-Administered Medications:     dextrose 50%, 12.5 g, Intravenous, PRN    dextrose 50%, 25 g, Intravenous, PRN    glucagon (human recombinant), 1 mg, Intramuscular, PRN    glucose, 16 g, Oral, PRN    glucose, 24 g, Oral, PRN     HYDROcodone-acetaminophen, 1 tablet, Oral, Q6H PRN    HYDROcodone-acetaminophen, 1 tablet, Oral, Q6H PRN    HYDROmorphone, 0.5 mg, Intravenous, Q3H PRN    insulin aspart U-100, 0-5 Units, Subcutaneous, QID (AC + HS) PRN    melatonin, 6 mg, Oral, Nightly PRN    ondansetron, 4 mg, Intravenous, Q8H PRN    Pharmacy to dose Vancomycin consult, , , Once **AND** vancomycin - pharmacy to dose, , Intravenous, pharmacy to manage frequency     Review of patient's allergies indicates:  No Known Allergies  Objective:     Vital Signs (Most Recent):  Temp: 97.9 °F (36.6 °C) (07/14/25 0746)  Pulse: 83 (07/14/25 0746)  Resp: 18 (07/14/25 0746)  BP: 123/75 (07/14/25 0746)  SpO2: 96 % (07/14/25 0746) Vital Signs (24h Range):  Temp:  [97.9 °F (36.6 °C)-99.9 °F (37.7 °C)] 97.9 °F (36.6 °C)  Pulse:  [] 83  Resp:  [10-20] 18  SpO2:  [93 %-97 %] 96 %  BP: ()/(52-84) 123/75     Weight: 100.9 kg (222 lb 8 oz)  Body mass index is 32.86 kg/m².    Intake/Output - Last 3 Shifts         07/12 0700  07/13 0659 07/13 0700  07/14 0659 07/14 0700  07/15 0659    I.V. (mL/kg) 860.7 (8.5) 473.7 (4.7)     IV Piggyback 2625.1 1623.5     Total Intake(mL/kg) 3485.8 (34.5) 2097.2 (20.8)     Net +3485.8 +2097.2            Unmeasured Urine Occurrence  1 x              Physical Exam  Constitutional:       General: He is not in acute distress.     Appearance: Normal appearance.   HENT:      Head: Normocephalic.   Cardiovascular:      Rate and Rhythm: Normal rate.   Pulmonary:      Effort: Pulmonary effort is normal. No respiratory distress.   Abdominal:      General: There is no distension.      Tenderness: There is no abdominal tenderness.   Musculoskeletal:         General: Normal range of motion.        Legs:         Feet:       Comments: Dressing to right anterior shin clean dry and intact   Feet:      Comments: Dressing to right foot clean dry and intact  Skin:     General: Skin is warm.      Coloration: Skin is not jaundiced.   Neurological:       General: No focal deficit present.      Mental Status: He is alert and oriented to person, place, and time.      Cranial Nerves: No cranial nerve deficit.          Significant Labs:  I have reviewed all pertinent lab results within the past 24 hours.  CBC:   Recent Labs   Lab 07/14/25 0226   WBC 10.82   RBC 3.52*   HGB 11.1*   HCT 33.3*      MCV 94.6   MCH 31.5*   MCHC 33.3     BMP:   Recent Labs   Lab 07/14/25 0226   *   *   K 3.6      CO2 26   BUN 8*   CREATININE 0.71*   CALCIUM 7.4*   MG 2.0       Significant Diagnostics:  I have reviewed all pertinent imaging results/findings within the past 24 hours.  Assessment/Plan:     Foot ulcer with necrosis of muscle, right  To the OR for debridement of right foot.      Risks and benefits explained with the patient including risks for infection, bleeding, injury to surrounding structures, hematoma/seroma formation with need for possible evacuation, possible open.  The patient verbalized understanding, agrees and wishes to proceed with surgery.    7/14:  Patient doing well.  Continue IV antibiotics.  We will change dressing tomorrow.  Awaiting wound and bone cultures        Moises Hobson, DO  General Surgery  [facility] - Orthopedic

## 2025-07-14 NOTE — ASSESSMENT & PLAN NOTE
Patient has sepsis without organ dysfunction secondary to Skin and Soft Tissue Infection: Cellulitis. A review of systems was completed. Patient's sepsis is worsening. Will continue current treatment    Current Antibiotics    vancomycin - pharmacy to dose, pharmacy to manage frequency, Intravenous  vancomycin (VANCOCIN) 2,000 mg in 0.9% NaCl 500 mL IVPB, Every 12 hours (non-standard times), Intravenous  piperacillin-tazobactam (ZOSYN) 4.5 g in D5W 100 mL IVPB (MB+), Every 8 hours (non-standard times), Intravenous    Lactate  Recent Labs   Lab 07/12/25  1502 07/12/25  1543   LACTATE 1.1  --    POCLAC  --  1.0     Culture Data  Blood Cultures   Culture, Blood   Date Value Ref Range Status   07/12/2025 No Growth At 24 Hours  Preliminary   07/12/2025 No Growth At 24 Hours  Preliminary   05/03/2024 No Growth at 5 days  Final   05/03/2024 No Growth at 5 days  Final      mSOFA  MSOFA Total  Min: 0   Min taken time: 07/14/25 1601  Max: 0   Max taken time: 07/14/25 1601    Plan  - Antibiotics as listed above  - Fluid resuscitation as follows:Ideal Body Weight- The patient is obese (BMI>30) and their ideal body weight of Ideal body weight: 70.7 kg (155 lb 13.8 oz) will be used to calculate fluid bolus of 30 ml/kg.   - Trend lactate to resolution  - Follow up culture data  - Vasopressors were not needed  - The following services were consulted:Hospital Medicine and Surgical Specialties.  - see above

## 2025-07-14 NOTE — ASSESSMENT & PLAN NOTE
- admit to inpatient  - sepsis protocol including fluids  - vanc and zoysn started  - surgery consulted, npo at midnight    7/13 - sp OR today, bone sample sent to test of osteo, following wound cultures, likely will consult ID for recommendations if osteo, there is some interesting new recommendations for abx in osteomyelitis and will appreciate their recs in that situation    7/14 - awaiting bone and wound cultures, if positive will need IV abx

## 2025-07-14 NOTE — ASSESSMENT & PLAN NOTE
To the OR for debridement of right foot.      Risks and benefits explained with the patient including risks for infection, bleeding, injury to surrounding structures, hematoma/seroma formation with need for possible evacuation, possible open.  The patient verbalized understanding, agrees and wishes to proceed with surgery.    7/14:  Patient doing well.  Continue IV antibiotics.  We will change dressing tomorrow.  Awaiting wound and bone cultures

## 2025-07-14 NOTE — SUBJECTIVE & OBJECTIVE
Past Medical History:   Diagnosis Date    Diabetes mellitus, type 2     Hyperlipidemia     Hypertension        Past Surgical History:   Procedure Laterality Date    DEBRIDEMENT OF FOOT Right 2025    Procedure: DEBRIDEMENT, FOOT;  Surgeon: Moises Varela DO;  Location: [facility];  Service: General;  Laterality: Right;       Review of patient's allergies indicates:  No Known Allergies    No current facility-administered medications on file prior to encounter.     Current Outpatient Medications on File Prior to Encounter   Medication Sig    ciprofloxacin HCl (CIPRO) 500 MG tablet Take 1 tablet (500 mg total) by mouth every 12 (twelve) hours.    insulin NPH-insulin regular, 70/30, (HUMULIN 70/30 U-100 INSULIN) 100 unit/mL (70-30) injection Inject 10 Units into the skin 2 (two) times daily. (Patient taking differently: Inject 10 Units into the skin 2 (two) times daily. PAP referred)    alcohol swabs (ALCOHOL PADS) PadM Apply 1 each topically as needed.    amLODIPine (NORVASC) 5 MG tablet Take 1 tablet (5 mg total) by mouth once daily.    atorvastatin (LIPITOR) 40 MG tablet Take 1 tablet (40 mg total) by mouth once daily.    insulin syringe-needle U-100 0.3 mL 29 gauge Syrg 1 Needle by Misc.(Non-Drug; Combo Route) route 2 (two) times a day.    losartan-hydrochlorothiazide 50-12.5 mg (HYZAAR) 50-12.5 mg per tablet Take 1 tablet by mouth once daily.    metFORMIN (GLUCOPHAGE) 500 MG tablet Take 1 tablet (500 mg total) by mouth 2 (two) times daily with meals.     Family History       Problem Relation (Age of Onset)    Diabetes Father    Heart disease Mother    Hypertension Father          Tobacco Use    Smoking status: Every Day     Current packs/day: 0.00     Types: Vaping with nicotine, Cigarettes     Last attempt to quit: 2019     Years since quittin.5    Smokeless tobacco: Never   Substance and Sexual Activity    Alcohol use: Yes     Comment: 6 pack a day    Drug use: Never    Sexual activity: Yes      Partners: Female     Review of Systems   All other systems reviewed and are negative.    Objective:     Vital Signs (Most Recent):  Temp: 98.3 °F (36.8 °C) (07/14/25 1151)  Pulse: 86 (07/14/25 1151)  Resp: 18 (07/14/25 0746)  BP: 125/81 (07/14/25 1151)  SpO2: (!) 92 % (07/14/25 1151) Vital Signs (24h Range):  Temp:  [97.9 °F (36.6 °C)-99.9 °F (37.7 °C)] 98.3 °F (36.8 °C)  Pulse:  [83-99] 86  Resp:  [18-20] 18  SpO2:  [92 %-96 %] 92 %  BP: (109-125)/(61-81) 125/81     Weight: 100.9 kg (222 lb 8 oz)  Body mass index is 32.86 kg/m².     Physical Exam  Vitals and nursing note reviewed.   Constitutional:       Appearance: He is ill-appearing.   HENT:      Mouth/Throat:      Mouth: Mucous membranes are dry.   Eyes:      Pupils: Pupils are equal, round, and reactive to light.   Neck:      Vascular: No carotid bruit.   Cardiovascular:      Rate and Rhythm: Regular rhythm. Tachycardia present.      Heart sounds: No murmur heard.  Pulmonary:      Effort: Pulmonary effort is normal.      Breath sounds: Normal breath sounds.   Abdominal:      General: Abdomen is flat. Bowel sounds are normal. There is no distension.      Palpations: Abdomen is soft.   Musculoskeletal:      Right lower leg: Edema present.      Left lower leg: No edema.      Comments: Right foot wound, see media   Skin:     General: Skin is warm and dry.   Neurological:      General: No focal deficit present.      Mental Status: He is alert. Mental status is at baseline.   Psychiatric:         Mood and Affect: Mood normal.              CRANIAL NERVES     CN III, IV, VI   Pupils are equal, round, and reactive to light.       Significant Labs: All pertinent labs within the past 24 hours have been reviewed.    Significant Imaging: I have reviewed all pertinent imaging results/findings within the past 24 hours.

## 2025-07-14 NOTE — SUBJECTIVE & OBJECTIVE
Interval History:  Stable, no acute events overnight.  Pain improved and foot.  Denies any chest pain/shortness of breath, nausea/vomiting/diarrhea, fever/chills.    Medications:  Continuous Infusions:   0.9% NaCl   Intravenous Continuous 125 mL/hr at 07/14/25 0546 Rate Verify at 07/14/25 0546     Scheduled Meds:   amLODIPine  5 mg Oral Daily    atorvastatin  40 mg Oral Daily    hydroCHLOROthiazide  25 mg Oral Daily    insulin glargine U-100  20 Units Subcutaneous QHS    losartan  50 mg Oral Daily    mupirocin   Nasal BID    piperacillin-tazobactam (Zosyn) IV (PEDS and ADULTS) (extended infusion is not appropriate)  4.5 g Intravenous Q8H    vancomycin (VANCOCIN) IV (PEDS and ADULTS)  2,000 mg Intravenous Q12H     PRN Meds:  Current Facility-Administered Medications:     dextrose 50%, 12.5 g, Intravenous, PRN    dextrose 50%, 25 g, Intravenous, PRN    glucagon (human recombinant), 1 mg, Intramuscular, PRN    glucose, 16 g, Oral, PRN    glucose, 24 g, Oral, PRN    HYDROcodone-acetaminophen, 1 tablet, Oral, Q6H PRN    HYDROcodone-acetaminophen, 1 tablet, Oral, Q6H PRN    HYDROmorphone, 0.5 mg, Intravenous, Q3H PRN    insulin aspart U-100, 0-5 Units, Subcutaneous, QID (AC + HS) PRN    melatonin, 6 mg, Oral, Nightly PRN    ondansetron, 4 mg, Intravenous, Q8H PRN    Pharmacy to dose Vancomycin consult, , , Once **AND** vancomycin - pharmacy to dose, , Intravenous, pharmacy to manage frequency     Review of patient's allergies indicates:  No Known Allergies  Objective:     Vital Signs (Most Recent):  Temp: 97.9 °F (36.6 °C) (07/14/25 0746)  Pulse: 83 (07/14/25 0746)  Resp: 18 (07/14/25 0746)  BP: 123/75 (07/14/25 0746)  SpO2: 96 % (07/14/25 0746) Vital Signs (24h Range):  Temp:  [97.9 °F (36.6 °C)-99.9 °F (37.7 °C)] 97.9 °F (36.6 °C)  Pulse:  [] 83  Resp:  [10-20] 18  SpO2:  [93 %-97 %] 96 %  BP: ()/(52-84) 123/75     Weight: 100.9 kg (222 lb 8 oz)  Body mass index is 32.86 kg/m².    Intake/Output - Last 3  Shifts         07/12 0700 07/13 0659 07/13 0700 07/14 0659 07/14 0700  07/15 0659    I.V. (mL/kg) 860.7 (8.5) 473.7 (4.7)     IV Piggyback 2625.1 1623.5     Total Intake(mL/kg) 3485.8 (34.5) 2097.2 (20.8)     Net +3485.8 +2097.2            Unmeasured Urine Occurrence  1 x              Physical Exam  Constitutional:       General: He is not in acute distress.     Appearance: Normal appearance.   HENT:      Head: Normocephalic.   Cardiovascular:      Rate and Rhythm: Normal rate.   Pulmonary:      Effort: Pulmonary effort is normal. No respiratory distress.   Abdominal:      General: There is no distension.      Tenderness: There is no abdominal tenderness.   Musculoskeletal:         General: Normal range of motion.        Legs:         Feet:       Comments: Dressing to right anterior shin clean dry and intact   Feet:      Comments: Dressing to right foot clean dry and intact  Skin:     General: Skin is warm.      Coloration: Skin is not jaundiced.   Neurological:      General: No focal deficit present.      Mental Status: He is alert and oriented to person, place, and time.      Cranial Nerves: No cranial nerve deficit.          Significant Labs:  I have reviewed all pertinent lab results within the past 24 hours.  CBC:   Recent Labs   Lab 07/14/25 0226   WBC 10.82   RBC 3.52*   HGB 11.1*   HCT 33.3*      MCV 94.6   MCH 31.5*   MCHC 33.3     BMP:   Recent Labs   Lab 07/14/25 0226   *   *   K 3.6      CO2 26   BUN 8*   CREATININE 0.71*   CALCIUM 7.4*   MG 2.0       Significant Diagnostics:  I have reviewed all pertinent imaging results/findings within the past 24 hours.

## 2025-07-14 NOTE — ASSESSMENT & PLAN NOTE
Patient's blood pressure range in the last 24 hours was: BP  Min: 109/63  Max: 125/81.The patient's inpatient anti-hypertensive regimen is listed below:  Current Antihypertensives  amLODIPine tablet 5 mg, Daily, Oral  losartan tablet 50 mg, Daily, Oral  hydroCHLOROthiazide tablet 25 mg, Daily, Oral    Plan  - BP is controlled, no changes needed to their regimen

## 2025-07-14 NOTE — PROGRESS NOTES
Vanc trough 6.2.    Patient needs a few more doses to get to steady state.    Will re-check trough in 2 more doses   Statement Selected

## 2025-07-14 NOTE — PLAN OF CARE
[facility]  Initial Discharge Assessment       Primary Care Provider: Jamilah, Primary Doctor    Admission Diagnosis: Screening for cardiovascular condition [Z13.6]  Gangrene of right foot [I96]  Sepsis, due to unspecified organism, unspecified whether acute organ dysfunction present [A41.9]    Admission Date: 7/12/2025  Expected Discharge Date:          Payor: /     Extended Emergency Contact Information  Primary Emergency Contact: Som Klein  Mobile Phone: [phone]  Relation: Relative  Preferred language: English   needed? No    Discharge Plan A: (P) Home, Home Health  Discharge Plan B: (P) Home, Home Health, Long-term acute care facility (LTAC), Rehab, Skilled Nursing Facility      [facility] [address]  [address]  [place] [address]  Phone: [phone] Fax: [phone]    [facility] [address] [place] [address] - [address] [facility]  [address] [facility] [address]  Phone: [phone] Fax: [phone]      Initial Assessment (most recent)       Adult Discharge Assessment - 07/14/25 1119          Discharge Assessment    Assessment Type Discharge Planning Assessment (P)      Source of Information patient (P)      People in Home alone (P)      Do you expect to return to your current living situation? Yes (P)      Do you have help at home or someone to help you manage your care at home? Yes (P)      Who are your caregiver(s) and their phone number(s)? Tamie Johnson, girlfriend, [phone] (P)      Prior to hospitilization cognitive status: Unable to Assess (P)      Current cognitive status: Alert/Oriented (P)      Walking or Climbing Stairs Difficulty no (P)      Dressing/Bathing Difficulty no (P)      Home Accessibility stairs to enter home (P)      Number of Stairs, Main Entrance three (P)      Stair Railings, Main Entrance railings on both sides of stairs (P)      Home Layout Able to live on 1st floor (P)      Equipment Currently Used at  Home glucometer (P)      Do you currently have service(s) that help you manage your care at home? No (P)      Do you take prescription medications? No (P)      Do you have prescription coverage? No (P)      Is the patient taking medications as prescribed? yes (P)      Who is going to help you get home at discharge? Tamie Johnson, girlfriend, [phone] (P)      How do you get to doctors appointments? car, drives self (P)      Are you on dialysis? No (P)      Discharge Plan A Home;Home Health (P)      Discharge Plan B Home;Home Health;Long-term acute care facility (LTAC);Rehab;Skilled Nursing Facility (P)      DME Needed Upon Discharge  none (P)      Discharge Plan discussed with: Patient (P)                  CM spoke with patient at bedside.  Patient lives at home alone and plans on returning when medically ready for discharge. Patient not on home health. Patient does not use DME. Patient noted to be self pay. Patient requesting that someone from financial assistance come see him.  SDOH current.  CM following for anticipated discharge needs.     1300- CM sent message to Karol Srinivasan with financial assistance informing her that patient is requesting to speak with her about getting set up for financial assistance. CM following.    1337- CM received message from MESHA Srinivasan with fa stating that with patient being on contact precautions, they are unable to go into his room.  She states that she will attempt to reach patient via phone. CM following.    1559- CM received message from MESHA Srinivasan in FA stating that patient does not qualify for FA or medicaid. CM following.

## 2025-07-14 NOTE — PROGRESS NOTES
[facility]  [facility]  Progress Note    Patient Name: Wallace Pedro  MRN: [id number]  Patient Class: IP- Inpatient   Admission Date: 7/12/2025  Length of Stay: 2 days  Attending Physician: Neela Machado MD  Primary Care Provider: Jamilah, Primary Doctor        Subjective     Principal Problem:Cellulitis of multiple sites of lower extremity        HPI:  48 yo male with pmh of T2DM presents to the ER for non-healing right foot wound with fever, chills, and pain for the last week. Wound has been present for 3 mohts but progressed in the 2 weeks. He also has additional wounds on the leg which are noted. Discussed with ED provider and agree with admission to hospital for abx and surgical consult.    Overview/Hospital Course:  7/13  - sp OR today, bone sample sent to test of osteo, following wound cultures, likely will consult ID for recommendations if osteo, there is some interesting new recommendations for abx in osteomyelitis and will appreciate their recs in that situation  - discussed Diabetic care today, recently started on insulin, at discharge would benefit from metformin/glyburide as well and these are affordable meds in no insurance situations  - will calculate ASCVD risk as well and likely start statin if indicated     7/14  - awaiting bone and wound cultures, if positive will need IV abx    Past Medical History:   Diagnosis Date    Diabetes mellitus, type 2     Hyperlipidemia     Hypertension        Past Surgical History:   Procedure Laterality Date    DEBRIDEMENT OF FOOT Right 7/13/2025    Procedure: DEBRIDEMENT, FOOT;  Surgeon: Moises Varela DO;  Location: [facility] OR;  Service: General;  Laterality: Right;       Review of patient's allergies indicates:  No Known Allergies    No current facility-administered medications on file prior to encounter.     Current Outpatient Medications on File Prior to Encounter   Medication Sig    ciprofloxacin HCl (CIPRO) 500 MG tablet Take 1  tablet (500 mg total) by mouth every 12 (twelve) hours.    insulin NPH-insulin regular, 70/30, (HUMULIN 70/30 U-100 INSULIN) 100 unit/mL (70-30) injection Inject 10 Units into the skin 2 (two) times daily. (Patient taking differently: Inject 10 Units into the skin 2 (two) times daily. PAP referred)    alcohol swabs (ALCOHOL PADS) PadM Apply 1 each topically as needed.    amLODIPine (NORVASC) 5 MG tablet Take 1 tablet (5 mg total) by mouth once daily.    atorvastatin (LIPITOR) 40 MG tablet Take 1 tablet (40 mg total) by mouth once daily.    insulin syringe-needle U-100 0.3 mL 29 gauge Syrg 1 Needle by Misc.(Non-Drug; Combo Route) route 2 (two) times a day.    losartan-hydrochlorothiazide 50-12.5 mg (HYZAAR) 50-12.5 mg per tablet Take 1 tablet by mouth once daily.    metFORMIN (GLUCOPHAGE) 500 MG tablet Take 1 tablet (500 mg total) by mouth 2 (two) times daily with meals.     Family History       Problem Relation (Age of Onset)    Diabetes Father    Heart disease Mother    Hypertension Father          Tobacco Use    Smoking status: Every Day     Current packs/day: 0.00     Types: Vaping with nicotine, Cigarettes     Last attempt to quit: 2019     Years since quittin.5    Smokeless tobacco: Never   Substance and Sexual Activity    Alcohol use: Yes     Comment: 6 pack a day    Drug use: Never    Sexual activity: Yes     Partners: Female     Review of Systems   All other systems reviewed and are negative.    Objective:     Vital Signs (Most Recent):  Temp: 98.3 °F (36.8 °C) (25 1151)  Pulse: 86 (25 1151)  Resp: 18 (25 0746)  BP: 125/81 (25 1151)  SpO2: (!) 92 % (25 1151) Vital Signs (24h Range):  Temp:  [97.9 °F (36.6 °C)-99.9 °F (37.7 °C)] 98.3 °F (36.8 °C)  Pulse:  [83-99] 86  Resp:  [18-20] 18  SpO2:  [92 %-96 %] 92 %  BP: (109-125)/(61-81) 125/81     Weight: 100.9 kg (222 lb 8 oz)  Body mass index is 32.86 kg/m².     Physical Exam  Vitals and nursing note reviewed.   Constitutional:        Appearance: He is ill-appearing.   HENT:      Mouth/Throat:      Mouth: Mucous membranes are dry.   Eyes:      Pupils: Pupils are equal, round, and reactive to light.   Neck:      Vascular: No carotid bruit.   Cardiovascular:      Rate and Rhythm: Regular rhythm. Tachycardia present.      Heart sounds: No murmur heard.  Pulmonary:      Effort: Pulmonary effort is normal.      Breath sounds: Normal breath sounds.   Abdominal:      General: Abdomen is flat. Bowel sounds are normal. There is no distension.      Palpations: Abdomen is soft.   Musculoskeletal:      Right lower leg: Edema present.      Left lower leg: No edema.      Comments: Right foot wound, see media   Skin:     General: Skin is warm and dry.   Neurological:      General: No focal deficit present.      Mental Status: He is alert. Mental status is at baseline.   Psychiatric:         Mood and Affect: Mood normal.              CRANIAL NERVES     CN III, IV, VI   Pupils are equal, round, and reactive to light.       Significant Labs: All pertinent labs within the past 24 hours have been reviewed.    Significant Imaging: I have reviewed all pertinent imaging results/findings within the past 24 hours.      Assessment & Plan  Cellulitis of multiple sites of lower extremity                - admit to inpatient  - sepsis protocol including fluids  - vanc and zoysn started  - surgery consulted, npo at midnight    7/13 - sp OR today, bone sample sent to test of osteo, following wound cultures, likely will consult ID for recommendations if osteo, there is some interesting new recommendations for abx in osteomyelitis and will appreciate their recs in that situation    7/14 - awaiting bone and wound cultures, if positive will need IV abx  Essential hypertension  Patient's blood pressure range in the last 24 hours was: BP  Min: 109/63  Max: 125/81.The patient's inpatient anti-hypertensive regimen is listed below:  Current Antihypertensives  amLODIPine tablet 5  mg, Daily, Oral  losartan tablet 50 mg, Daily, Oral  hydroCHLOROthiazide tablet 25 mg, Daily, Oral    Plan  - BP is controlled, no changes needed to their regimen    Type 2 diabetes mellitus with hyperglycemia, without long-term current use of insulin  Uncontrolled due to hyperglycemia  Hold home meds  Sliding scale    Sepsis  Patient has sepsis without organ dysfunction secondary to Skin and Soft Tissue Infection: Cellulitis. A review of systems was completed. Patient's sepsis is worsening. Will continue current treatment    Current Antibiotics    vancomycin - pharmacy to dose, pharmacy to manage frequency, Intravenous  vancomycin (VANCOCIN) 2,000 mg in 0.9% NaCl 500 mL IVPB, Every 12 hours (non-standard times), Intravenous  piperacillin-tazobactam (ZOSYN) 4.5 g in D5W 100 mL IVPB (MB+), Every 8 hours (non-standard times), Intravenous    Lactate  Recent Labs   Lab 07/12/25  1502 07/12/25  1543   LACTATE 1.1  --    POCLAC  --  1.0     Culture Data  Blood Cultures   Culture, Blood   Date Value Ref Range Status   07/12/2025 No Growth At 24 Hours  Preliminary   07/12/2025 No Growth At 24 Hours  Preliminary   05/03/2024 No Growth at 5 days  Final   05/03/2024 No Growth at 5 days  Final      mSOFA  MSOFA Total  Min: 0   Min taken time: 07/14/25 1601  Max: 0   Max taken time: 07/14/25 1601    Plan  - Antibiotics as listed above  - Fluid resuscitation as follows:Ideal Body Weight- The patient is obese (BMI>30) and their ideal body weight of Ideal body weight: 70.7 kg (155 lb 13.8 oz) will be used to calculate fluid bolus of 30 ml/kg.   - Trend lactate to resolution  - Follow up culture data  - Vasopressors were not needed  - The following services were consulted:Hospital Medicine and Surgical Specialties.  - see above    Foot ulcer with necrosis of muscle, right  See above    VTE Risk Mitigation (From admission, onward)           Ordered     IP VTE HIGH RISK PATIENT  Once         07/12/25 5727     Place sequential  compression device  Until discontinued         07/12/25 1753                    Discharge Planning   NIDHI: 7/21/2025     Code Status: Full Code   Medical Readiness for Discharge Date:   Discharge Plan A: Home, Home Health                        Neela Machado MD  Department of Hospital Medicine   [facility]

## 2025-07-14 NOTE — PROGRESS NOTES
[facility]  Wound Care    Patient Name:  Wallace Pedro   MRN:  [id number]  Date: 2025  Diagnosis: Cellulitis of multiple sites of lower extremity    History:     Past Medical History:   Diagnosis Date    Diabetes mellitus, type 2     Hyperlipidemia     Hypertension        Social History[1]    Precautions:     Allergies as of 2025    (No Known Allergies)       [facility] Assessment Details/Treatment   Narrative: Seen patient for initial preventative skin care measures.  Patient ambulates.  No foam borders, redness, or open wounds noted to left heel and sacral. Dressing to right heel.  Consult wound care of any findings.  2025        [1]   Social History  Socioeconomic History    Marital status:    Tobacco Use    Smoking status: Every Day     Current packs/day: 0.00     Types: Vaping with nicotine, Cigarettes     Last attempt to quit: 2019     Years since quittin.5    Smokeless tobacco: Never   Substance and Sexual Activity    Alcohol use: Yes     Comment: 6 pack a day    Drug use: Never    Sexual activity: Yes     Partners: Female     Social Drivers of Health     Financial Resource Strain: Low Risk  (2025)    Overall Financial Resource Strain (CARDIA)     Difficulty of Paying Living Expenses: Not very hard   Food Insecurity: No Food Insecurity (2025)    Hunger Vital Sign     Worried About Running Out of Food in the Last Year: Never true     Ran Out of Food in the Last Year: Never true   Transportation Needs: No Transportation Needs (2025)    PRAPARE - Transportation     Lack of Transportation (Medical): No     Lack of Transportation (Non-Medical): No   Physical Activity: Insufficiently Active (2024)    Exercise Vital Sign     Days of Exercise per Week: 4 days     Minutes of Exercise per Session: 20 min   Stress: No Stress Concern Present (2025)    [employer] - Occupational Stress Questionnaire     Feeling of Stress : Not at  all   Housing Stability: Low Risk  (7/12/2025)    Housing Stability Vital Sign     Unable to Pay for Housing in the Last Year: No     Homeless in the Last Year: No

## 2025-07-15 VITALS
OXYGEN SATURATION: 96 % | RESPIRATION RATE: 18 BRPM | WEIGHT: 222.5 LBS | BODY MASS INDEX: 32.95 KG/M2 | SYSTOLIC BLOOD PRESSURE: 149 MMHG | HEART RATE: 90 BPM | HEIGHT: 69 IN | TEMPERATURE: 98 F | DIASTOLIC BLOOD PRESSURE: 81 MMHG

## 2025-07-15 PROBLEM — E87.6 HYPOKALEMIA: Status: ACTIVE | Noted: 2025-07-15

## 2025-07-15 PROBLEM — E11.21 DIABETIC NEPHROPATHY: Status: ACTIVE | Noted: 2025-07-15

## 2025-07-15 PROBLEM — D64.9 ANEMIA: Status: ACTIVE | Noted: 2025-07-15

## 2025-07-15 PROBLEM — L97.509 DIABETIC FOOT ULCER: Status: ACTIVE | Noted: 2025-07-15

## 2025-07-15 PROBLEM — E11.51 DIABETIC PERIPHERAL VASCULAR DISEASE: Status: ACTIVE | Noted: 2025-07-15

## 2025-07-15 PROBLEM — E11.621 DIABETIC FOOT ULCER: Status: ACTIVE | Noted: 2025-07-15

## 2025-07-15 LAB
ALBUMIN SERPL BCP-MCNC: 1.9 G/DL (ref 3.5–5)
ALBUMIN/GLOB SERPL: 0.4 {RATIO}
ALP SERPL-CCNC: 105 U/L (ref 40–150)
ALT SERPL W P-5'-P-CCNC: 17 U/L
ANION GAP SERPL CALCULATED.3IONS-SCNC: 9 MMOL/L (ref 7–16)
AST SERPL W P-5'-P-CCNC: 22 U/L (ref 11–45)
BASOPHILS # BLD AUTO: 0.03 K/UL (ref 0–0.2)
BASOPHILS NFR BLD AUTO: 0.3 % (ref 0–1)
BILIRUB SERPL-MCNC: 0.3 MG/DL
BUN SERPL-MCNC: 7 MG/DL (ref 9–21)
BUN/CREAT SERPL: 11 (ref 6–20)
CALCIUM SERPL-MCNC: 8.4 MG/DL (ref 8.4–10.2)
CHLORIDE SERPL-SCNC: 100 MMOL/L (ref 98–107)
CO2 SERPL-SCNC: 29 MMOL/L (ref 22–29)
CREAT SERPL-MCNC: 0.66 MG/DL (ref 0.72–1.25)
CULTURE, BONE: NORMAL
DIFFERENTIAL METHOD BLD: ABNORMAL
EGFR (NO RACE VARIABLE) (RUSH/TITUS): 116 ML/MIN/1.73M2
EOSINOPHIL # BLD AUTO: 0.14 K/UL (ref 0–0.5)
EOSINOPHIL NFR BLD AUTO: 1.6 % (ref 1–4)
ERYTHROCYTE [DISTWIDTH] IN BLOOD BY AUTOMATED COUNT: 11.7 % (ref 11.5–14.5)
ESTROGEN SERPL-MCNC: NORMAL PG/ML
GLOBULIN SER-MCNC: 4.3 G/DL (ref 2–4)
GLUCOSE SERPL-MCNC: 217 MG/DL (ref 70–105)
GLUCOSE SERPL-MCNC: 237 MG/DL (ref 74–100)
GLUCOSE SERPL-MCNC: 277 MG/DL (ref 70–105)
HCT VFR BLD AUTO: 33.2 % (ref 40–54)
HGB BLD-MCNC: 11.2 G/DL (ref 13.5–18)
IMM GRANULOCYTES # BLD AUTO: 0.04 K/UL (ref 0–0.04)
IMM GRANULOCYTES NFR BLD: 0.5 % (ref 0–0.4)
INSULIN SERPL-ACNC: NORMAL U[IU]/ML
LAB AP GROSS DESCRIPTION: NORMAL
LAB AP LABORATORY NOTES: NORMAL
LYMPHOCYTES # BLD AUTO: 1.02 K/UL (ref 1–4.8)
LYMPHOCYTES NFR BLD AUTO: 11.7 % (ref 27–41)
MAGNESIUM SERPL-MCNC: 1.7 MG/DL (ref 1.6–2.6)
MCH RBC QN AUTO: 31.4 PG (ref 27–31)
MCHC RBC AUTO-ENTMCNC: 33.7 G/DL (ref 32–36)
MCV RBC AUTO: 93 FL (ref 80–96)
MONOCYTES # BLD AUTO: 0.86 K/UL (ref 0–0.8)
MONOCYTES NFR BLD AUTO: 9.8 % (ref 2–6)
MPC BLD CALC-MCNC: 10 FL (ref 9.4–12.4)
NEUTROPHILS # BLD AUTO: 6.66 K/UL (ref 1.8–7.7)
NEUTROPHILS NFR BLD AUTO: 76.1 % (ref 53–65)
NRBC # BLD AUTO: 0 X10E3/UL
NRBC, AUTO (.00): 0 %
PHOSPHATE SERPL-MCNC: 2.9 MG/DL (ref 2.3–4.7)
PLATELET # BLD AUTO: 233 K/UL (ref 150–400)
POTASSIUM SERPL-SCNC: 3.2 MMOL/L (ref 3.5–5.1)
PROT SERPL-MCNC: 6.2 G/DL (ref 6.4–8.3)
RBC # BLD AUTO: 3.57 M/UL (ref 4.6–6.2)
SODIUM SERPL-SCNC: 135 MMOL/L (ref 136–145)
T3RU NFR SERPL: NORMAL %
VANCOMYCIN TROUGH SERPL-MCNC: 10.8 ΜG/ML (ref 15–20)
WBC # BLD AUTO: 8.75 K/UL (ref 4.5–11)

## 2025-07-15 PROCEDURE — 36415 COLL VENOUS BLD VENIPUNCTURE: CPT | Performed by: STUDENT IN AN ORGANIZED HEALTH CARE EDUCATION/TRAINING PROGRAM

## 2025-07-15 PROCEDURE — 25000003 PHARM REV CODE 250: Performed by: STUDENT IN AN ORGANIZED HEALTH CARE EDUCATION/TRAINING PROGRAM

## 2025-07-15 PROCEDURE — 94761 N-INVAS EAR/PLS OXIMETRY MLT: CPT

## 2025-07-15 PROCEDURE — 99239 HOSP IP/OBS DSCHRG MGMT >30: CPT | Mod: ,,, | Performed by: STUDENT IN AN ORGANIZED HEALTH CARE EDUCATION/TRAINING PROGRAM

## 2025-07-15 PROCEDURE — 84100 ASSAY OF PHOSPHORUS: CPT | Performed by: STUDENT IN AN ORGANIZED HEALTH CARE EDUCATION/TRAINING PROGRAM

## 2025-07-15 PROCEDURE — 85025 COMPLETE CBC W/AUTO DIFF WBC: CPT | Performed by: STUDENT IN AN ORGANIZED HEALTH CARE EDUCATION/TRAINING PROGRAM

## 2025-07-15 PROCEDURE — 83735 ASSAY OF MAGNESIUM: CPT | Performed by: STUDENT IN AN ORGANIZED HEALTH CARE EDUCATION/TRAINING PROGRAM

## 2025-07-15 PROCEDURE — 80053 COMPREHEN METABOLIC PANEL: CPT | Performed by: STUDENT IN AN ORGANIZED HEALTH CARE EDUCATION/TRAINING PROGRAM

## 2025-07-15 PROCEDURE — 63600175 PHARM REV CODE 636 W HCPCS: Performed by: STUDENT IN AN ORGANIZED HEALTH CARE EDUCATION/TRAINING PROGRAM

## 2025-07-15 PROCEDURE — 63600175 PHARM REV CODE 636 W HCPCS

## 2025-07-15 PROCEDURE — 80202 ASSAY OF VANCOMYCIN: CPT

## 2025-07-15 PROCEDURE — 25000003 PHARM REV CODE 250

## 2025-07-15 PROCEDURE — 82962 GLUCOSE BLOOD TEST: CPT

## 2025-07-15 RX ORDER — HYDROCODONE BITARTRATE AND ACETAMINOPHEN 5; 325 MG/1; MG/1
1 TABLET ORAL EVERY 4 HOURS PRN
Qty: 18 TABLET | Refills: 0 | Status: SHIPPED | OUTPATIENT
Start: 2025-07-15 | End: 2025-07-18

## 2025-07-15 RX ORDER — DOXYCYCLINE HYCLATE 100 MG
100 TABLET ORAL EVERY 12 HOURS
Qty: 28 TABLET | Refills: 0 | Status: SHIPPED | OUTPATIENT
Start: 2025-07-15 | End: 2025-07-29

## 2025-07-15 RX ORDER — POTASSIUM CHLORIDE 20 MEQ/1
40 TABLET, EXTENDED RELEASE ORAL ONCE
Status: COMPLETED | OUTPATIENT
Start: 2025-07-15 | End: 2025-07-15

## 2025-07-15 RX ADMIN — HYDROCHLOROTHIAZIDE 25 MG: 25 TABLET ORAL at 08:07

## 2025-07-15 RX ADMIN — POTASSIUM CHLORIDE EXTENDED-RELEASE 40 MEQ: 1500 TABLET ORAL at 08:07

## 2025-07-15 RX ADMIN — INSULIN ASPART 2 UNITS: 100 INJECTION, SOLUTION INTRAVENOUS; SUBCUTANEOUS at 08:07

## 2025-07-15 RX ADMIN — MUPIROCIN: 20 OINTMENT TOPICAL at 08:07

## 2025-07-15 RX ADMIN — ATORVASTATIN CALCIUM 40 MG: 40 TABLET, FILM COATED ORAL at 08:07

## 2025-07-15 RX ADMIN — LOSARTAN POTASSIUM 50 MG: 50 TABLET, FILM COATED ORAL at 08:07

## 2025-07-15 RX ADMIN — PIPERACILLIN SODIUM AND TAZOBACTAM SODIUM 4.5 G: 4; .5 INJECTION, POWDER, LYOPHILIZED, FOR SOLUTION INTRAVENOUS at 05:07

## 2025-07-15 RX ADMIN — HYDROCODONE BITARTRATE AND ACETAMINOPHEN 1 TABLET: 10; 325 TABLET ORAL at 05:07

## 2025-07-15 RX ADMIN — AMLODIPINE BESYLATE 5 MG: 5 TABLET ORAL at 08:07

## 2025-07-15 RX ADMIN — HYDROCODONE BITARTRATE AND ACETAMINOPHEN 1 TABLET: 10; 325 TABLET ORAL at 02:07

## 2025-07-15 RX ADMIN — SODIUM CHLORIDE 2000 MG: 9 INJECTION, SOLUTION INTRAVENOUS at 05:07

## 2025-07-15 RX ADMIN — PIPERACILLIN SODIUM AND TAZOBACTAM SODIUM 4.5 G: 4; .5 INJECTION, POWDER, LYOPHILIZED, FOR SOLUTION INTRAVENOUS at 01:07

## 2025-07-15 NOTE — DISCHARGE INSTRUCTIONS
Cleanse the area with Vashe daily, dry well and then cover with gauze Kerlix and Ace wrap.  Make sure to keep area around toes dry; change dressing multiple times a day if needed    Hospitalist Discharge orders  *Notify your healthcare provider if you experience any of the following: temperature >100.4  *Notify your healthcare provider if you experience any of the following: redness, tenderness, or any signs or symptoms of infection (pain, swelling, redness, odor or green/yellow drainage around incision site).  *Notify your healthcare provider if you experience any of the following: difficulty breathing or increased cough.  *Notify your physician if you experience any persistent nausea, vomiting, diarrhea or headache.  *Notify your physician if you experience any of the following: severe uncontrolled pain, worsening rash, increased confusion, weakness, dizziness, lightheadedness, or visual disturbances.

## 2025-07-15 NOTE — ASSESSMENT & PLAN NOTE
"Patient's FSGs are uncontrolled due to hyperglycemia on current medication regimen.  Last A1c reviewed-   Lab Results   Component Value Date    HGBA1C 11.6 (H) 07/02/2025     Most recent fingerstick glucose reviewed- No results for input(s): "POCTGLUCOSE" in the last 24 hours.  Current correctional scale  Medium  Maintain anti-hyperglycemic dose as follows-   Antihyperglycemics (From admission, onward)      Start     Stop Route Frequency Ordered    07/14/25 2100  insulin glargine U-100 (Lantus) injection 40 Units         -- SubQ Nightly 07/14/25 1637    07/12/25 1851  insulin aspart U-100 injection 0-5 Units         -- SubQ Before meals & nightly PRN 07/12/25 1753          Hold Oral hypoglycemics while patient is in the hospital.  "

## 2025-07-15 NOTE — SUBJECTIVE & OBJECTIVE
Interval History:  Stable, no acute events overnight.  Pain improved and foot.  Denies any chest pain/shortness of breath, nausea/vomiting/diarrhea, fever/chills.    Medications:  Continuous Infusions:      Scheduled Meds:   amLODIPine  5 mg Oral Daily    atorvastatin  40 mg Oral Daily    hydroCHLOROthiazide  25 mg Oral Daily    insulin glargine U-100  40 Units Subcutaneous QHS    losartan  50 mg Oral Daily    mupirocin   Nasal BID    piperacillin-tazobactam (Zosyn) IV (PEDS and ADULTS) (extended infusion is not appropriate)  4.5 g Intravenous Q8H    potassium chloride  40 mEq Oral Once    vancomycin (VANCOCIN) IV (PEDS and ADULTS)  2,000 mg Intravenous Q12H     PRN Meds:  Current Facility-Administered Medications:     dextrose 50%, 12.5 g, Intravenous, PRN    dextrose 50%, 25 g, Intravenous, PRN    glucagon (human recombinant), 1 mg, Intramuscular, PRN    glucose, 16 g, Oral, PRN    glucose, 24 g, Oral, PRN    HYDROcodone-acetaminophen, 1 tablet, Oral, Q6H PRN    HYDROcodone-acetaminophen, 1 tablet, Oral, Q6H PRN    HYDROmorphone, 0.5 mg, Intravenous, Q3H PRN    insulin aspart U-100, 0-5 Units, Subcutaneous, QID (AC + HS) PRN    melatonin, 6 mg, Oral, Nightly PRN    ondansetron, 4 mg, Intravenous, Q8H PRN    Pharmacy to dose Vancomycin consult, , , Once **AND** vancomycin - pharmacy to dose, , Intravenous, pharmacy to manage frequency     Review of patient's allergies indicates:  No Known Allergies  Objective:     Vital Signs (Most Recent):  Temp: 98.3 °F (36.8 °C) (07/15/25 0747)  Pulse: 79 (07/15/25 0747)  Resp: 18 (07/15/25 0747)  BP: 127/76 (07/15/25 0747)  SpO2: 95 % (07/15/25 0747) Vital Signs (24h Range):  Temp:  [98.3 °F (36.8 °C)-99.3 °F (37.4 °C)] 98.3 °F (36.8 °C)  Pulse:  [79-97] 79  Resp:  [16-20] 18  SpO2:  [92 %-96 %] 95 %  BP: (108-136)/(70-99) 127/76     Weight: 100.9 kg (222 lb 8 oz)  Body mass index is 32.86 kg/m².    Intake/Output - Last 3 Shifts         07/13 0700 07/14 0659 07/14 0700  07/15  0659 07/15 0700  07/16 0659    P.O.  720     I.V. (mL/kg) 473.7 (4.7)      IV Piggyback 1623.5      Total Intake(mL/kg) 2097.2 (20.8) 720 (7.1)     Urine (mL/kg/hr)  2900 (1.2)     Total Output  2900     Net +2097.2 -2180            Unmeasured Urine Occurrence 1 x 3 x              Physical Exam  Constitutional:       General: He is not in acute distress.     Appearance: Normal appearance.   HENT:      Head: Normocephalic.   Cardiovascular:      Rate and Rhythm: Normal rate.   Pulmonary:      Effort: Pulmonary effort is normal. No respiratory distress.   Abdominal:      General: There is no distension.      Tenderness: There is no abdominal tenderness.   Musculoskeletal:         General: Normal range of motion.        Legs:         Feet:       Comments: Dressing to right anterior shin clean dry and intact   Feet:      Comments:  Penrose drain in place.  No necrosis present.  Bleeding controlled.  Dressing replaced; some macerated tissue around his toes  Skin:     General: Skin is warm.      Coloration: Skin is not jaundiced.   Neurological:      General: No focal deficit present.      Mental Status: He is alert and oriented to person, place, and time.      Cranial Nerves: No cranial nerve deficit.          Significant Labs:  I have reviewed all pertinent lab results within the past 24 hours.  CBC:   Recent Labs   Lab 07/15/25  0208   WBC 8.75   RBC 3.57*   HGB 11.2*   HCT 33.2*      MCV 93.0   MCH 31.4*   MCHC 33.7     BMP:   Recent Labs   Lab 07/15/25  0208   *   *   K 3.2*      CO2 29   BUN 7*   CREATININE 0.66*   CALCIUM 8.4   MG 1.7       Significant Diagnostics:  I have reviewed all pertinent imaging results/findings within the past 24 hours.

## 2025-07-15 NOTE — PROGRESS NOTES
Subjective     Patient ID: Wallace Pedro is a 49 y.o. male.    Chief Complaint: Establish Care, Diabetes, sores on leg, Hypertension (Exam Room D ), and Health Maintenance (Hepatitis C Screening Never done/Diabetes Urine Screening Never done/Foot Exam Never done/Diabetic Eye Exam Never done/HIV Screening Never done/Pneumococcal Vaccines (Age 0-49)(1 of 2 - PCV) Never done/Colorectal Cancer Screening Never done/Hemoglobin A1c due on 08/03/2024/COVID-19 Vaccine(1 - 2024-25 season) Never done/Lipid Panel due on 05/04/2025/Low Dose Statin due on 05/09/2025 SICK VISIT)    History of Present Illness    CHIEF COMPLAINT:  Patient presents today to [facility]. Has not been compliant with medications, routine office visits, and has developed multiple wounds.   WOUNDS:  He reports chronic wounds on leg and foot present for a few months. He has been performing self-wound care at home by washing the wounds and previously using oil. He notes having had similar spots in the past that took a few months to heal. He denies seeking urgent care for these wounds.    DIABETES:  He reports last being seen for diabetes approximately one year ago by another provider. He has been off diabetes medication for about a year after running out of medication. He admits to not checking blood sugar and not taking care of his health. He denies experiencing any known symptoms of high or low blood sugar.    SOCIAL HISTORY:  He lives alone. He is employed.    SUBSTANCE USE:  He consumes a six-pack of alcohol daily and has contemplated quitting alcohol with previous attempts. He denies history of delirium tremens. He currently vapes with nicotine.    ALLERGIES:  No known drug allergies            Review of Systems   Constitutional:  Negative for activity change, appetite change and fatigue.   Eyes:  Negative for visual disturbance.   Respiratory:  Negative for chest tightness and shortness of breath.    Cardiovascular:  Negative for chest pain, palpitations  "and leg swelling.   Gastrointestinal:  Negative for abdominal pain, constipation, diarrhea, nausea and vomiting.   Endocrine: Negative for polydipsia, polyphagia and polyuria.   Genitourinary:  Positive for frequency. Negative for decreased urine volume, difficulty urinating, dysuria, flank pain and urgency.   Skin:  Positive for wound. Negative for rash.   Neurological:  Negative for dizziness, weakness, light-headedness, numbness and headaches.         Vital Signs  Temp: 98.5 °F (36.9 °C)  Pulse: 91  SpO2: 99 %  BP: (!) 160/99  BP Location: Right arm  Patient Position: Sitting  Height and Weight  Height: 5' 9" (175.3 cm)  Weight: 102.2 kg (225 lb 6.4 oz)  BSA (Calculated - sq m): 2.23 sq meters  BMI (Calculated): 33.3  Weight in (lb) to have BMI = 25: 168.9]    Physical Exam  Vitals and nursing note reviewed.   Constitutional:       General: He is not in acute distress.     Appearance: Normal appearance. He is obese. He is not ill-appearing, toxic-appearing or diaphoretic.   Cardiovascular:      Rate and Rhythm: Normal rate and regular rhythm.      Pulses: Normal pulses.      Heart sounds: Normal heart sounds.   Pulmonary:      Effort: Pulmonary effort is normal.      Breath sounds: Normal breath sounds.   Abdominal:      General: Abdomen is flat. Bowel sounds are normal.      Palpations: Abdomen is soft.   Musculoskeletal:         General: Normal range of motion.   Skin:     General: Skin is warm and dry.      Capillary Refill: Capillary refill takes less than 2 seconds.      Findings: Lesion (multiple open wounds to RLE on anterior portion. these are erythematous with scant serousanguinous drainage.  open wound with yellow and green drainage, foul odor on plantar surface of foot just beneath toes.) present.   Neurological:      General: No focal deficit present.      Mental Status: He is alert and oriented to person, place, and time.   Psychiatric:         Mood and Affect: Mood normal.         Behavior: Behavior " normal.         Thought Content: Thought content normal.         Judgment: Judgment normal.            Assessment and Plan     1. Encounter for hepatitis C screening test for low risk patient  -     Hepatitis C antibody; Future; Expected date: 07/02/2025    2. At risk for acute ischemic cardiac event  -     atorvastatin (LIPITOR) 40 MG tablet; Take 1 tablet (40 mg total) by mouth once daily.  Dispense: 30 tablet; Refill: 0    3. Primary hypertension  -     losartan-hydrochlorothiazide 50-12.5 mg (HYZAAR) 50-12.5 mg per tablet; Take 1 tablet by mouth once daily.  Dispense: 30 tablet; Refill: 0  -     amLODIPine (NORVASC) 5 MG tablet; Take 1 tablet (5 mg total) by mouth once daily.  Dispense: 30 tablet; Refill: 0    4. Uncontrolled type 2 diabetes mellitus with hyperglycemia  -     Lipid panel; Future; Expected date: 07/02/2025  -     Microalbumin/creatinine urine ratio  -     Comprehensive Metabolic Panel; Future; Expected date: 07/02/2025  -     Hemoglobin A1C; Future; Expected date: 07/02/2025  -     losartan-hydrochlorothiazide 50-12.5 mg (HYZAAR) 50-12.5 mg per tablet; Take 1 tablet by mouth once daily.  Dispense: 30 tablet; Refill: 0  -     metFORMIN (GLUCOPHAGE) 500 MG tablet; Take 1 tablet (500 mg total) by mouth 2 (two) times daily with meals.  Dispense: 60 tablet; Refill: 0  -     Ambulatory referral/consult to Pharmacy Assistance; Future; Expected date: 07/09/2025  -     Discontinue: insulin NPH-insulin regular, 70/30, (HUMULIN 70/30 U-100 INSULIN) 100 unit/mL (70-30) injection; Inject 10 Units into the skin 2 (two) times daily. (Patient taking differently: Inject 10 Units into the skin 2 (two) times daily. PAP referred)  Dispense: 6 mL; Refill: 11  -     insulin syringe-needle U-100 0.3 mL 29 gauge Syrg; 1 Needle by Misc.(Non-Drug; Combo Route) route 2 (two) times a day.  Dispense: 100 each; Refill: 11  -     alcohol swabs (ALCOHOL PADS) PadM; Apply 1 each topically as needed.  Dispense: 200 each; Refill:  11    5. Wound of right lower extremity, initial encounter  -     Culture, Wound  -     CBC Auto Differential; Future; Expected date: 07/02/2025  -     Ambulatory referral/consult to Wound Clinic; Future; Expected date: 07/09/2025  -     Discontinue: ciprofloxacin HCl (CIPRO) 500 MG tablet; Take 1 tablet (500 mg total) by mouth every 12 (twelve) hours.  Dispense: 20 tablet; Refill: 0  -     cefTRIAXone (Rocephin) 1.9951 g in LIDOcaine HCL 10 mg/ml (1%) 5.7 mL IM only syringe    6. Ulcer of right foot, unspecified ulcer stage  -     Culture, Wound  -     CBC Auto Differential; Future; Expected date: 07/02/2025  -     Ambulatory referral/consult to Wound Clinic; Future; Expected date: 07/09/2025  -     Discontinue: ciprofloxacin HCl (CIPRO) 500 MG tablet; Take 1 tablet (500 mg total) by mouth every 12 (twelve) hours.  Dispense: 20 tablet; Refill: 0  -     cefTRIAXone (Rocephin) 1.9951 g in LIDOcaine HCL 10 mg/ml (1%) 5.7 mL IM only syringe      Assessment & Plan    E11.622 Type 2 diabetes mellitus with other skin ulcer  E11.65 Type 2 diabetes mellitus with hyperglycemia  L97.429 Non-pressure chronic ulcer of left heel and midfoot with unspecified severity  L97.529 Non-pressure chronic ulcer of other part of left foot with unspecified severity  F10.20 Alcohol dependence, uncomplicated  F17.290 Nicotine dependence, other tobacco product, uncomplicated  Z88.9 Allergy status to unspecified drugs, medicaments and biological substances    ## DIABETIC FOOT AND LEG ULCERS:  - Assessed diabetic foot and leg ulcers present for several months without recent medical intervention.  - The leg ulcer is a few months old, while the foot ulcer is approximately 2 months old.  - Patient reports history of similar ulcers that have healed previously.  - Ordered wound cultures from 2 separate sites (bottom of foot and leg).  - Treatment plan includes cleaning both ulcers with saline and gauze, applying damp to dry dressings, and wrapping  them appropriately.    ## TYPE 2 DIABETES MANAGEMENT:  - Patient has not been taking diabetes medication for approximately 1 year and has not been monitoring glucose levels.  - No reported symptoms of hyperglycemia or hypoglycemia.  - Ordered labs today to assess current condition.    ## ALCOHOL DEPENDENCE:  - Patient reports consuming a six-pack of alcohol daily.  - Discussed previous attempts to quit.  - Identified alcohol use as a potential factor affecting wound healing and overall health.  - Recommend reducing or discontinuing alcohol consumption.    ## NICOTINE DEPENDENCE:  - Patient is currently vaping with nicotine.  - Identified as a potential factor affecting wound healing and overall health.  - Recommend considering cessation of nicotine vaping.    ## MEDICATION MANAGEMENT:  - Initiated process for pharmacy assistance program to address patient's lack of insurance and medication access.    ## ALLERGY STATUS:  - Patient reports no known allergies.    ## FOLLOW-UP:  - Follow up in 1 month.  - Patient instructed to contact the office if they have not received any medicines or experience any issues before the next appointment.                  Follow up in about 4 weeks (around 7/30/2025).    This note was generated with the assistance of ambient listening technology. Verbal consent was obtained by the patient and accompanying visitor(s) for the recording of patient appointment to facilitate this note. I attest to having reviewed and edited the generated note for accuracy, though some syntax or spelling errors may persist. Please contact the author of this note for any clarification.         I spent a total of 30 minutes on the day of the visit.This includes face to face time and non-face to face time preparing to see the patient (eg, review of tests), obtaining and/or reviewing separately obtained history, documenting clinical information in the electronic or other health record, independently interpreting  results and communicating results to the patient/family/caregiver, or care coordinator.    BRIANNA Finch

## 2025-07-15 NOTE — ASSESSMENT & PLAN NOTE
Patient has sepsis without organ dysfunction secondary to Skin and Soft Tissue Infection: Cellulitis. A review of systems was completed. Patient's sepsis is worsening. Will continue current treatment    Current Antibiotics    vancomycin - pharmacy to dose, pharmacy to manage frequency, Intravenous  vancomycin (VANCOCIN) 2,000 mg in 0.9% NaCl 500 mL IVPB, Every 12 hours (non-standard times), Intravenous  piperacillin-tazobactam (ZOSYN) 4.5 g in D5W 100 mL IVPB (MB+), Every 8 hours (non-standard times), Intravenous  doxycycline (VIBRA-TABS) 100 mg tablet, Every 12 hours, Oral    Lactate  Recent Labs   Lab 07/12/25  1502 07/12/25  1543   LACTATE 1.1  --    POCLAC  --  1.0     Culture Data  Blood Cultures   Culture, Blood   Date Value Ref Range Status   07/12/2025 No Growth At 48 Hours  Preliminary   07/12/2025 No Growth At 48 Hours  Preliminary   05/03/2024 No Growth at 5 days  Final   05/03/2024 No Growth at 5 days  Final      mSOFA  MSOFA Total  Min: 0   Min taken time: 07/15/25 1000  Max: 0   Max taken time: 07/15/25 1000    Plan  - Antibiotics as listed above  - Fluid resuscitation as follows:Ideal Body Weight- The patient is obese (BMI>30) and their ideal body weight of Ideal body weight: 70.7 kg (155 lb 13.8 oz) will be used to calculate fluid bolus of 30 ml/kg.   - Trend lactate to resolution  - Follow up culture data  - Vasopressors were not needed  - The following services were consulted:Hospital Medicine and Surgical Specialties.  - see above  Continue doxycycline for 2 weeks

## 2025-07-15 NOTE — ASSESSMENT & PLAN NOTE
To the OR for debridement of right foot.      Risks and benefits explained with the patient including risks for infection, bleeding, injury to surrounding structures, hematoma/seroma formation with need for possible evacuation, possible open.  The patient verbalized understanding, agrees and wishes to proceed with surgery.    7/14:  Patient doing well.  Continue IV antibiotics.  We will change dressing tomorrow.  Awaiting wound and bone cultures    7/15:  Bone cultures -48 hours.  Anticipate discharge home on oral antibiotics; okay discharge today.  Penrose drain to stay in place and we will follow patient in clinic in 2 weeks for drain removal

## 2025-07-15 NOTE — PROGRESS NOTES
Pharmacy assisting in the management of vancomycin for this patient for: wounds    Clinical info: renal function stable; cx negative to date; can probably d/c    Vancomycin level: 10.8    Changes to be made: none, continue    Pharmacy will continue to monitor daily and make adjustments as needed.    Selin Scott, PharmD  [phone]

## 2025-07-15 NOTE — PROGRESS NOTES
[facility]  General Surgery  Progress Note    Subjective:     History of Present Illness:  49-year-old  male presents to the hospital with complaints of a right foot wound.  Patient states the wound has been present for about 3 months but has progressively gotten worse.  Past medical history of diabetes.  Patient also had several ulcers on the leg which he has been trying to take care of at home.  Leukocytosis of 14,000; right foot x-ray shows hallux valgus deformity. No acute fracture or traumatic subluxation. Soft tissue edema of the forefoot. Cellulitis is not excluded. No mass or foreign body is identified. No soft tissue air is detected. No osseous destruction.  Admitted to the hospital for IV antibiotics, general surgery to evaluate for debridement.  Denies any chest pain/shortness of breath, nausea/vomiting/diarrhea, fever/chills.    Post-Op Info:  Procedure(s) (LRB):  DEBRIDEMENT, FOOT (Right)   2 Days Post-Op     Interval History:  Stable, no acute events overnight.  Pain improved and foot.  Denies any chest pain/shortness of breath, nausea/vomiting/diarrhea, fever/chills.    Medications:  Continuous Infusions:      Scheduled Meds:   amLODIPine  5 mg Oral Daily    atorvastatin  40 mg Oral Daily    hydroCHLOROthiazide  25 mg Oral Daily    insulin glargine U-100  40 Units Subcutaneous QHS    losartan  50 mg Oral Daily    mupirocin   Nasal BID    piperacillin-tazobactam (Zosyn) IV (PEDS and ADULTS) (extended infusion is not appropriate)  4.5 g Intravenous Q8H    potassium chloride  40 mEq Oral Once    vancomycin (VANCOCIN) IV (PEDS and ADULTS)  2,000 mg Intravenous Q12H     PRN Meds:  Current Facility-Administered Medications:     dextrose 50%, 12.5 g, Intravenous, PRN    dextrose 50%, 25 g, Intravenous, PRN    glucagon (human recombinant), 1 mg, Intramuscular, PRN    glucose, 16 g, Oral, PRN    glucose, 24 g, Oral, PRN    HYDROcodone-acetaminophen, 1 tablet, Oral, Q6H PRN     HYDROcodone-acetaminophen, 1 tablet, Oral, Q6H PRN    HYDROmorphone, 0.5 mg, Intravenous, Q3H PRN    insulin aspart U-100, 0-5 Units, Subcutaneous, QID (AC + HS) PRN    melatonin, 6 mg, Oral, Nightly PRN    ondansetron, 4 mg, Intravenous, Q8H PRN    Pharmacy to dose Vancomycin consult, , , Once **AND** vancomycin - pharmacy to dose, , Intravenous, pharmacy to manage frequency     Review of patient's allergies indicates:  No Known Allergies  Objective:     Vital Signs (Most Recent):  Temp: 98.3 °F (36.8 °C) (07/15/25 0747)  Pulse: 79 (07/15/25 0747)  Resp: 18 (07/15/25 0747)  BP: 127/76 (07/15/25 0747)  SpO2: 95 % (07/15/25 0747) Vital Signs (24h Range):  Temp:  [98.3 °F (36.8 °C)-99.3 °F (37.4 °C)] 98.3 °F (36.8 °C)  Pulse:  [79-97] 79  Resp:  [16-20] 18  SpO2:  [92 %-96 %] 95 %  BP: (108-136)/(70-99) 127/76     Weight: 100.9 kg (222 lb 8 oz)  Body mass index is 32.86 kg/m².    Intake/Output - Last 3 Shifts         07/13 0700  07/14 0659 07/14 0700  07/15 0659 07/15 0700  07/16 0659    P.O.  720     I.V. (mL/kg) 473.7 (4.7)      IV Piggyback 1623.5      Total Intake(mL/kg) 2097.2 (20.8) 720 (7.1)     Urine (mL/kg/hr)  2900 (1.2)     Total Output  2900     Net +2097.2 -2180            Unmeasured Urine Occurrence 1 x 3 x              Physical Exam  Constitutional:       General: He is not in acute distress.     Appearance: Normal appearance.   HENT:      Head: Normocephalic.   Cardiovascular:      Rate and Rhythm: Normal rate.   Pulmonary:      Effort: Pulmonary effort is normal. No respiratory distress.   Abdominal:      General: There is no distension.      Tenderness: There is no abdominal tenderness.   Musculoskeletal:         General: Normal range of motion.        Legs:         Feet:       Comments: Dressing to right anterior shin clean dry and intact   Feet:      Comments:  Penrose drain in place.  No necrosis present.  Bleeding controlled.  Dressing replaced; some macerated tissue around his toes  Skin:      General: Skin is warm.      Coloration: Skin is not jaundiced.   Neurological:      General: No focal deficit present.      Mental Status: He is alert and oriented to person, place, and time.      Cranial Nerves: No cranial nerve deficit.          Significant Labs:  I have reviewed all pertinent lab results within the past 24 hours.  CBC:   Recent Labs   Lab 07/15/25  0208   WBC 8.75   RBC 3.57*   HGB 11.2*   HCT 33.2*      MCV 93.0   MCH 31.4*   MCHC 33.7     BMP:   Recent Labs   Lab 07/15/25  0208   *   *   K 3.2*      CO2 29   BUN 7*   CREATININE 0.66*   CALCIUM 8.4   MG 1.7       Significant Diagnostics:  I have reviewed all pertinent imaging results/findings within the past 24 hours.  Assessment/Plan:     Foot ulcer with necrosis of muscle, right  To the OR for debridement of right foot.      Risks and benefits explained with the patient including risks for infection, bleeding, injury to surrounding structures, hematoma/seroma formation with need for possible evacuation, possible open.  The patient verbalized understanding, agrees and wishes to proceed with surgery.    7/14:  Patient doing well.  Continue IV antibiotics.  We will change dressing tomorrow.  Awaiting wound and bone cultures    7/15:  Bone cultures -48 hours.  Anticipate discharge home on oral antibiotics; okay discharge today.  Penrose drain to stay in place and we will follow patient in clinic in 2 weeks for drain removal        Moises Hobson, DO  General Surgery  [facility]

## 2025-07-15 NOTE — PLAN OF CARE
[facility]  Discharge Final Note    Primary Care Provider: No, Primary Doctor    Expected Discharge Date: 7/15/2025    Final Discharge Note (most recent)       Final Note - 07/15/25 1610          Final Note    Assessment Type Final Discharge Note (P)      Anticipated Discharge Disposition Home or Self Care (P)      What phone number can be called within the next 1-3 days to see how you are doing after discharge? [phone] (P)      Hospital Resources/Appts/Education Provided Provided patient/caregiver with written discharge plan information (P)         Post-Acute Status    Discharge Delays None known at this time (P)                       Contact Info       Moises Varela DO   Specialty: General Surgery, Surgery    [address] [facility] [address]   Phone: [phone]       Next Steps: Schedule an appointment as soon as possible for a visit in 2 week(s)    Instructions: Please follow up on July 29 at 9:15 am    Ruel Contreras MD   Specialty: Family Medicine    [address] [facility] [address]   Phone: [phone]       Next Steps: Schedule an appointment as soon as possible for a visit in 1 week(s)    Instructions: Please follow up on July 25 at 11:30 am          Patient discharged home self care.  No further needs noted.

## 2025-07-15 NOTE — PLAN OF CARE
Problem: Adult Inpatient Plan of Care  Goal: Plan of Care Review  Outcome: Progressing  Goal: Patient-Specific Goal (Individualized)  Outcome: Progressing  Goal: Absence of Hospital-Acquired Illness or Injury  Outcome: Progressing  Goal: Optimal Comfort and Wellbeing  Outcome: Progressing  Goal: Readiness for Transition of Care  Outcome: Progressing     Problem: Diabetes Comorbidity  Goal: Blood Glucose Level Within Targeted Range  Outcome: Progressing     Problem: Sepsis/Septic Shock  Goal: Optimal Coping  Outcome: Progressing  Goal: Absence of Bleeding  Outcome: Progressing  Goal: Blood Glucose Level Within Targeted Range  Outcome: Progressing  Goal: Absence of Infection Signs and Symptoms  Outcome: Progressing  Goal: Optimal Nutrition Intake  Outcome: Progressing     Problem: Wound  Goal: Optimal Coping  Outcome: Progressing  Goal: Optimal Functional Ability  Outcome: Progressing  Goal: Absence of Infection Signs and Symptoms  Outcome: Progressing  Goal: Improved Oral Intake  Outcome: Progressing  Goal: Optimal Pain Control and Function  Outcome: Progressing  Goal: Skin Health and Integrity  Outcome: Progressing  Goal: Optimal Wound Healing  Outcome: Progressing

## 2025-07-15 NOTE — ASSESSMENT & PLAN NOTE
Patient's most recent potassium results are listed below.   Recent Labs     07/13/25  0431 07/14/25  0226 07/15/25  0208   K 3.6 3.6 3.2*     Plan  - Replete potassium per protocol  - Monitor potassium Daily  - Patient's hypokalemia is stable  - replete

## 2025-07-15 NOTE — DISCHARGE SUMMARY
[facility]  [facility]  Discharge Summary      Patient Name: Wallace Pedro  MRN: [id number]  SARAH: [id number]  Patient Class: IP- Inpatient  Admission Date: 7/12/2025  Hospital Length of Stay: 3 days  Discharge Date and Time: 07/15/2025 10:21 AM  Attending Physician: Chacorta Yip DO   Discharging Provider: Chacorta Yip DO  Primary Care Provider: Jamilah, Primary Doctor    Primary Care Team: Networked reference to record PCT     HPI:   48 yo male with pmh of T2DM presents to the ER for non-healing right foot wound with fever, chills, and pain for the last week. Wound has been present for 3 mohts but progressed in the 2 weeks. He also has additional wounds on the leg which are noted. Discussed with ED provider and agree with admission to hospital for abx and surgical consult.    Procedure(s) (LRB):  DEBRIDEMENT, FOOT (Right)      Hospital Course:   7/13  - sp OR today, bone sample sent to test of osteo, following wound cultures, likely will consult ID for recommendations if osteo, there is some interesting new recommendations for abx in osteomyelitis and will appreciate their recs in that situation  - discussed Diabetic care today, recently started on insulin, at discharge would benefit from metformin/glyburide as well and these are affordable meds in no insurance situations  - will calculate ASCVD risk as well and likely start statin if indicated     7/14  - awaiting bone and wound cultures, if positive will need IV abx  Bone cultures negative.  Stable for discharge.  Two more weeks doxy.  Refer to resident clinic to establish care.  Follow up surgery in 2 weeks     Goals of Care Treatment Preferences:  Code Status: Full Code         Consults:   Consults (From admission, onward)          Status Ordering Provider     Inpatient consult to General Surgery  Once        Provider:  (Not yet assigned)    Completed FREDERIC BUTLER     Pharmacy to dose Vancomycin consult  Once        Provider:  (Not  "yet assigned)   Placed in "And" Linked Group    Acknowledged REGINO HOLGUIN            Assessment & Plan  Cellulitis of multiple sites of lower extremity                - admit to inpatient  - sepsis protocol including fluids  - vanc and zoysn started  - surgery consulted, npo at midnight    7/13 - sp OR today, bone sample sent to test of osteo, following wound cultures, likely will consult ID for recommendations if osteo, there is some interesting new recommendations for abx in osteomyelitis and will appreciate their recs in that situation    7/14 - awaiting bone and wound cultures, if positive will need IV abx  No osteo.  Doxycycline for 2 weeks  Essential hypertension  Patient's blood pressure range in the last 24 hours was: BP  Min: 108/77  Max: 136/83.The patient's inpatient anti-hypertensive regimen is listed below:  Current Antihypertensives  amLODIPine tablet 5 mg, Daily, Oral  losartan tablet 50 mg, Daily, Oral  hydroCHLOROthiazide tablet 25 mg, Daily, Oral    Plan  - BP is controlled, no changes needed to their regimen    Type 2 diabetes mellitus with hyperglycemia, without long-term current use of insulin  Uncontrolled due to hyperglycemia  Hold home meds  Sliding scale    Sepsis  Patient has sepsis without organ dysfunction secondary to Skin and Soft Tissue Infection: Cellulitis. A review of systems was completed. Patient's sepsis is worsening. Will continue current treatment    Current Antibiotics    vancomycin - pharmacy to dose, pharmacy to manage frequency, Intravenous  vancomycin (VANCOCIN) 2,000 mg in 0.9% NaCl 500 mL IVPB, Every 12 hours (non-standard times), Intravenous  piperacillin-tazobactam (ZOSYN) 4.5 g in D5W 100 mL IVPB (MB+), Every 8 hours (non-standard times), Intravenous  doxycycline (VIBRA-TABS) 100 mg tablet, Every 12 hours, Oral    Lactate  Recent Labs   Lab 07/12/25  1502 07/12/25  1543   LACTATE 1.1  --    POCLAC  --  1.0     Culture Data  Blood Cultures   Culture, Blood   Date " "Value Ref Range Status   07/12/2025 No Growth At 48 Hours  Preliminary   07/12/2025 No Growth At 48 Hours  Preliminary   05/03/2024 No Growth at 5 days  Final   05/03/2024 No Growth at 5 days  Final      mSOFA  MSOFA Total  Min: 0   Min taken time: 07/15/25 1000  Max: 0   Max taken time: 07/15/25 1000    Plan  - Antibiotics as listed above  - Fluid resuscitation as follows:Ideal Body Weight- The patient is obese (BMI>30) and their ideal body weight of Ideal body weight: 70.7 kg (155 lb 13.8 oz) will be used to calculate fluid bolus of 30 ml/kg.   - Trend lactate to resolution  - Follow up culture data  - Vasopressors were not needed  - The following services were consulted:Hospital Medicine and Surgical Specialties.  - see above  Continue doxycycline for 2 weeks  Foot ulcer with necrosis of muscle, right  See above    Diabetic foot ulcer  Patient's FSGs are uncontrolled due to hyperglycemia on current medication regimen.  Last A1c reviewed-   Lab Results   Component Value Date    HGBA1C 11.6 (H) 07/02/2025     Most recent fingerstick glucose reviewed- No results for input(s): "POCTGLUCOSE" in the last 24 hours.  Current correctional scale  Medium  Maintain anti-hyperglycemic dose as follows-   Antihyperglycemics (From admission, onward)      Start     Stop Route Frequency Ordered    07/14/25 2100  insulin glargine U-100 (Lantus) injection 40 Units         -- SubQ Nightly 07/14/25 1637    07/12/25 1851  insulin aspart U-100 injection 0-5 Units         -- SubQ Before meals & nightly PRN 07/12/25 1753          Hold Oral hypoglycemics while patient is in the hospital.  Diabetic peripheral vascular disease  Glycemic control  Statin    Anemia  Anemia is likely due to megaloblastic anemia. Most recent hemoglobin and hematocrit are listed below.  Recent Labs     07/13/25  0431 07/14/25  0226 07/15/25  0208   HGB 11.9* 11.1* 11.2*   HCT 36.1* 33.3* 33.2*     Plan  - Monitor serial CBC: Daily  - Transfuse PRBC if patient " becomes hemodynamically unstable, symptomatic or H/H drops below 7/21.  - Patient has not received any PRBC transfusions to date  - Patient's anemia is currently stable  - follow  Hypokalemia  Patient's most recent potassium results are listed below.   Recent Labs     07/13/25  0431 07/14/25  0226 07/15/25  0208   K 3.6 3.6 3.2*     Plan  - Replete potassium per protocol  - Monitor potassium Daily  - Patient's hypokalemia is stable  - replete  Diabetic nephropathy  Glycemic control   Arb    Final Active Diagnoses:    Diagnosis Date Noted POA    PRINCIPAL PROBLEM:  Cellulitis of multiple sites of lower extremity [L03.119] 05/04/2024 Yes    Diabetic foot ulcer [E11.621, L97.509] 07/15/2025 Yes    Diabetic peripheral vascular disease [E11.51] 07/15/2025 Yes    Anemia [D64.9] 07/15/2025 Yes    Hypokalemia [E87.6] 07/15/2025 No    Diabetic nephropathy [E11.21] 07/15/2025 Yes    Foot ulcer with necrosis of muscle, right [L97.513] 07/13/2025 Yes    Sepsis [A41.9] 07/12/2025 Yes    Type 2 diabetes mellitus with hyperglycemia, without long-term current use of insulin [E11.65] 05/04/2024 Yes    Essential hypertension [I10] 12/28/2022 Yes      Problems Resolved During this Admission:       Discharged Condition: good    Disposition: Home or Self Care    Follow Up:   Follow-up Information       Moises Varela, . Schedule an appointment as soon as possible for a visit in 2 week(s).    Specialties: General Surgery, Surgery  Contact information:  [address] [facility]  [facility] [address]  [phone]               Ruel Contreras MD. Schedule an appointment as soon as possible for a visit in 1 week(s).    Specialty: Family Medicine  Contact information:  [address] [facility] [address]  [phone]                           Patient Instructions:      Diet diabetic     Activity as tolerated       Significant Diagnostic Studies: Labs: All labs within the past 24 hours have been  reviewed    Pending Diagnostic Studies:       Procedure Component Value Units Date/Time    Urinalysis, Reflex to Urine Culture [[id number]]     Order Status: Sent Lab Status: No result     Specimen: Urine            Medications:  Reconciled Home Medications:      Medication List        START taking these medications      doxycycline 100 MG tablet  Commonly known as: VIBRA-TABS  Take 1 tablet (100 mg total) by mouth every 12 (twelve) hours. for 14 days            CONTINUE taking these medications      alcohol swabs Padm  Commonly known as: ALCOHOL PADS  Apply 1 each topically as needed.     amLODIPine 5 MG tablet  Commonly known as: NORVASC  Take 1 tablet (5 mg total) by mouth once daily.     atorvastatin 40 MG tablet  Commonly known as: LIPITOR  Take 1 tablet (40 mg total) by mouth once daily.     insulin NPH-insulin regular (70/30) 100 unit/mL (70-30) injection  Commonly known as: HumuLIN 70/30 U-100 Insulin  Inject 10 Units into the skin 2 (two) times daily. PAP referred     insulin syringe-needle U-100 0.3 mL 29 gauge Syrg  1 Needle by Misc.(Non-Drug; Combo Route) route 2 (two) times a day.     losartan-hydrochlorothiazide 50-12.5 mg 50-12.5 mg per tablet  Commonly known as: HYZAAR  Take 1 tablet by mouth once daily.     metFORMIN 500 MG tablet  Commonly known as: GLUCOPHAGE  Take 1 tablet (500 mg total) by mouth 2 (two) times daily with meals.            STOP taking these medications      ciprofloxacin HCl 500 MG tablet  Commonly known as: CIPRO              Indwelling Lines/Drains at time of discharge:   Lines/Drains/Airways       None                       Time spent on the discharge of patient: 35 minutes         Chacorta Yip DO  Department of Hospital Medicine  [facility]

## 2025-07-15 NOTE — ASSESSMENT & PLAN NOTE
Anemia is likely due to megaloblastic anemia. Most recent hemoglobin and hematocrit are listed below.  Recent Labs     07/13/25  0431 07/14/25  0226 07/15/25  0208   HGB 11.9* 11.1* 11.2*   HCT 36.1* 33.3* 33.2*     Plan  - Monitor serial CBC: Daily  - Transfuse PRBC if patient becomes hemodynamically unstable, symptomatic or H/H drops below 7/21.  - Patient has not received any PRBC transfusions to date  - Patient's anemia is currently stable  - follow

## 2025-07-16 NOTE — ANESTHESIA POSTPROCEDURE EVALUATION
Anesthesia Post Evaluation    Patient: Wallace Pedro    Procedure(s) Performed: Procedure(s) (LRB):  DEBRIDEMENT, FOOT (Right)    Final Anesthesia Type: general      Patient location during evaluation: PACU  Patient participation: Yes- Able to Participate  Level of consciousness: awake and alert  Post-procedure vital signs: reviewed and stable  Pain management: adequate  Airway patency: patent  MARLIN mitigation strategies: Multimodal analgesia  PONV status at discharge: No PONV  Anesthetic complications: no      Cardiovascular status: blood pressure returned to baseline  Respiratory status: unassisted  Hydration status: euvolemic  Follow-up not needed.              Vitals Value Taken Time   /81 07/15/25 11:48   Temp 36.7 °C (98 °F) 07/15/25 11:48   Pulse 90 07/15/25 11:48   Resp 18 07/15/25 14:09   SpO2 96 % 07/15/25 11:48         Event Time   Out of Recovery 07/13/2025 09:55:00         Pain/Luis Alfredo Score: Pain Rating Prior to Med Admin: 7 (7/15/2025  2:09 PM)  Pain Rating Post Med Admin: 5 (7/15/2025  2:41 PM)

## 2025-07-17 LAB
BACTERIA SPEC ANAEROBE CULT: ABNORMAL
BACTERIA SPEC ANAEROBE CULT: ABNORMAL
MICROORGANISM SPEC CULT: ABNORMAL

## 2025-07-18 LAB
BACTERIA BLD CULT: NORMAL
BACTERIA BLD CULT: NORMAL

## 2025-07-24 LAB
OHS QRS DURATION: 84 MS
OHS QTC CALCULATION: 404 MS

## 2025-07-29 ENCOUNTER — OFFICE VISIT (OUTPATIENT)
Dept: SURGERY | Facility: CLINIC | Age: 50
End: 2025-07-29
Payer: COMMERCIAL

## 2025-07-29 ENCOUNTER — ANESTHESIA (OUTPATIENT)
Dept: SURGERY | Facility: HOSPITAL | Age: 50
End: 2025-07-29
Payer: COMMERCIAL

## 2025-07-29 ENCOUNTER — HOSPITAL ENCOUNTER (INPATIENT)
Facility: HOSPITAL | Age: 50
LOS: 6 days | Discharge: HOME HEALTH | DRG: 240 | End: 2025-08-04
Attending: EMERGENCY MEDICINE | Admitting: STUDENT IN AN ORGANIZED HEALTH CARE EDUCATION/TRAINING PROGRAM
Payer: COMMERCIAL

## 2025-07-29 ENCOUNTER — ANESTHESIA EVENT (OUTPATIENT)
Dept: SURGERY | Facility: HOSPITAL | Age: 50
End: 2025-07-29
Payer: COMMERCIAL

## 2025-07-29 VITALS — BODY MASS INDEX: 32.86 KG/M2 | HEIGHT: 69 IN

## 2025-07-29 DIAGNOSIS — L97.509 DIABETIC FOOT ULCER: ICD-10-CM

## 2025-07-29 DIAGNOSIS — L97.513: ICD-10-CM

## 2025-07-29 DIAGNOSIS — L97.419 DIABETIC ULCER OF RIGHT MIDFOOT ASSOCIATED WITH TYPE 2 DIABETES MELLITUS, UNSPECIFIED ULCER STAGE: Primary | ICD-10-CM

## 2025-07-29 DIAGNOSIS — L03.119 CELLULITIS OF MULTIPLE SITES OF LOWER EXTREMITY: Primary | ICD-10-CM

## 2025-07-29 DIAGNOSIS — E11.621 DIABETIC FOOT ULCER: ICD-10-CM

## 2025-07-29 DIAGNOSIS — L97.513 FOOT ULCER, RIGHT, WITH NECROSIS OF MUSCLE: ICD-10-CM

## 2025-07-29 DIAGNOSIS — R07.9 CHEST PAIN: ICD-10-CM

## 2025-07-29 DIAGNOSIS — E11.621 DIABETIC ULCER OF RIGHT MIDFOOT ASSOCIATED WITH TYPE 2 DIABETES MELLITUS, UNSPECIFIED ULCER STAGE: Primary | ICD-10-CM

## 2025-07-29 DIAGNOSIS — L08.9 DIABETIC FOOT INFECTION: ICD-10-CM

## 2025-07-29 DIAGNOSIS — E11.628 DIABETIC FOOT INFECTION: ICD-10-CM

## 2025-07-29 DIAGNOSIS — L03.119 CELLULITIS OF MULTIPLE SITES OF LOWER EXTREMITY: ICD-10-CM

## 2025-07-29 PROBLEM — E66.9 OBESITY (BMI 30-39.9): Status: ACTIVE | Noted: 2025-07-29

## 2025-07-29 PROBLEM — E78.5 DM TYPE 2 WITH DIABETIC DYSLIPIDEMIA: Status: ACTIVE | Noted: 2025-07-29

## 2025-07-29 PROBLEM — E11.69 DM TYPE 2 WITH DIABETIC DYSLIPIDEMIA: Status: ACTIVE | Noted: 2025-07-29

## 2025-07-29 LAB
ACETONE SERPL QL SCN: NEGATIVE
ALBUMIN SERPL BCP-MCNC: 2.8 G/DL (ref 3.5–5)
ALBUMIN/GLOB SERPL: 0.5 {RATIO}
ALP SERPL-CCNC: 118 U/L (ref 40–150)
ALT SERPL W P-5'-P-CCNC: 9 U/L
ANION GAP SERPL CALCULATED.3IONS-SCNC: 13 MMOL/L (ref 7–16)
APTT PPP: 25.9 SECONDS (ref 25.2–37.3)
AST SERPL W P-5'-P-CCNC: 19 U/L (ref 11–45)
BASOPHILS # BLD AUTO: 0.05 K/UL (ref 0–0.2)
BASOPHILS NFR BLD AUTO: 0.5 % (ref 0–1)
BILIRUB SERPL-MCNC: 0.4 MG/DL
BILIRUB UR QL STRIP: NEGATIVE
BUN SERPL-MCNC: 9 MG/DL (ref 9–21)
BUN/CREAT SERPL: 12 (ref 6–20)
CALCIUM SERPL-MCNC: 9.4 MG/DL (ref 8.4–10.2)
CHLORIDE SERPL-SCNC: 99 MMOL/L (ref 98–107)
CLARITY UR: CLEAR
CO2 SERPL-SCNC: 26 MMOL/L (ref 22–29)
COLOR UR: COLORLESS
CREAT SERPL-MCNC: 0.78 MG/DL (ref 0.72–1.25)
CRP SERPL HS-MCNC: 83.68 MG/L
DIFFERENTIAL METHOD BLD: ABNORMAL
EGFR (NO RACE VARIABLE) (RUSH/TITUS): 111 ML/MIN/1.73M2
EOSINOPHIL # BLD AUTO: 0.11 K/UL (ref 0–0.5)
EOSINOPHIL NFR BLD AUTO: 1.1 % (ref 1–4)
ERYTHROCYTE [DISTWIDTH] IN BLOOD BY AUTOMATED COUNT: 12 % (ref 11.5–14.5)
GLOBULIN SER-MCNC: 5.4 G/DL (ref 2–4)
GLUCOSE SERPL-MCNC: 250 MG/DL (ref 70–105)
GLUCOSE SERPL-MCNC: 271 MG/DL (ref 70–105)
GLUCOSE SERPL-MCNC: 396 MG/DL (ref 70–105)
GLUCOSE SERPL-MCNC: 404 MG/DL (ref 74–100)
GLUCOSE SERPL-MCNC: 464 MG/DL (ref 70–105)
GLUCOSE UR STRIP-MCNC: >1000 MG/DL
HCT VFR BLD AUTO: 42.8 % (ref 40–54)
HGB BLD-MCNC: 14.3 G/DL (ref 13.5–18)
IMM GRANULOCYTES # BLD AUTO: 0.03 K/UL (ref 0–0.04)
IMM GRANULOCYTES NFR BLD: 0.3 % (ref 0–0.4)
INR BLD: 1.03
KETONES UR STRIP-SCNC: ABNORMAL MG/DL
LEUKOCYTE ESTERASE UR QL STRIP: NEGATIVE
LYMPHOCYTES # BLD AUTO: 1.87 K/UL (ref 1–4.8)
LYMPHOCYTES NFR BLD AUTO: 18.7 % (ref 27–41)
MCH RBC QN AUTO: 30.8 PG (ref 27–31)
MCHC RBC AUTO-ENTMCNC: 33.4 G/DL (ref 32–36)
MCV RBC AUTO: 92 FL (ref 80–96)
MONOCYTES # BLD AUTO: 0.95 K/UL (ref 0–0.8)
MONOCYTES NFR BLD AUTO: 9.5 % (ref 2–6)
MPC BLD CALC-MCNC: 10.3 FL (ref 9.4–12.4)
NEUTROPHILS # BLD AUTO: 6.98 K/UL (ref 1.8–7.7)
NEUTROPHILS NFR BLD AUTO: 69.9 % (ref 53–65)
NITRITE UR QL STRIP: NEGATIVE
NRBC # BLD AUTO: 0 X10E3/UL
NRBC, AUTO (.00): 0 %
PH UR STRIP: 5.5 PH UNITS
PLATELET # BLD AUTO: 352 K/UL (ref 150–400)
POTASSIUM SERPL-SCNC: 3.9 MMOL/L (ref 3.5–5.1)
PROT SERPL-MCNC: 8.2 G/DL (ref 6.4–8.3)
PROT UR QL STRIP: 10
PROTHROMBIN TIME: 13.6 SECONDS (ref 11.7–14.7)
RBC # BLD AUTO: 4.65 M/UL (ref 4.6–6.2)
RBC # UR STRIP: NEGATIVE /UL
SODIUM SERPL-SCNC: 134 MMOL/L (ref 136–145)
SP GR UR STRIP: 1.04
UROBILINOGEN UR STRIP-ACNC: NORMAL MG/DL
WBC # BLD AUTO: 9.99 K/UL (ref 4.5–11)

## 2025-07-29 PROCEDURE — 82009 KETONE BODYS QUAL: CPT | Performed by: STUDENT IN AN ORGANIZED HEALTH CARE EDUCATION/TRAINING PROGRAM

## 2025-07-29 PROCEDURE — 71000033 HC RECOVERY, INTIAL HOUR: Performed by: STUDENT IN AN ORGANIZED HEALTH CARE EDUCATION/TRAINING PROGRAM

## 2025-07-29 PROCEDURE — 99900035 HC TECH TIME PER 15 MIN (STAT)

## 2025-07-29 PROCEDURE — 11000001 HC ACUTE MED/SURG PRIVATE ROOM

## 2025-07-29 PROCEDURE — 88305 TISSUE EXAM BY PATHOLOGIST: CPT | Mod: 26,,, | Performed by: PATHOLOGY

## 2025-07-29 PROCEDURE — 36000707: Performed by: STUDENT IN AN ORGANIZED HEALTH CARE EDUCATION/TRAINING PROGRAM

## 2025-07-29 PROCEDURE — 87075 CULTR BACTERIA EXCEPT BLOOD: CPT | Performed by: STUDENT IN AN ORGANIZED HEALTH CARE EDUCATION/TRAINING PROGRAM

## 2025-07-29 PROCEDURE — 37000009 HC ANESTHESIA EA ADD 15 MINS: Performed by: STUDENT IN AN ORGANIZED HEALTH CARE EDUCATION/TRAINING PROGRAM

## 2025-07-29 PROCEDURE — 99223 1ST HOSP IP/OBS HIGH 75: CPT | Mod: ,,, | Performed by: STUDENT IN AN ORGANIZED HEALTH CARE EDUCATION/TRAINING PROGRAM

## 2025-07-29 PROCEDURE — 88311 DECALCIFY TISSUE: CPT | Mod: TC,SUR | Performed by: STUDENT IN AN ORGANIZED HEALTH CARE EDUCATION/TRAINING PROGRAM

## 2025-07-29 PROCEDURE — 25000003 PHARM REV CODE 250: Performed by: STUDENT IN AN ORGANIZED HEALTH CARE EDUCATION/TRAINING PROGRAM

## 2025-07-29 PROCEDURE — 99285 EMERGENCY DEPT VISIT HI MDM: CPT | Mod: 25

## 2025-07-29 PROCEDURE — 63600175 PHARM REV CODE 636 W HCPCS: Performed by: STUDENT IN AN ORGANIZED HEALTH CARE EDUCATION/TRAINING PROGRAM

## 2025-07-29 PROCEDURE — 37000008 HC ANESTHESIA 1ST 15 MINUTES: Performed by: STUDENT IN AN ORGANIZED HEALTH CARE EDUCATION/TRAINING PROGRAM

## 2025-07-29 PROCEDURE — 0Y6M0ZD DETACHMENT AT RIGHT FOOT, PARTIAL 4TH RAY, OPEN APPROACH: ICD-10-PCS | Performed by: STUDENT IN AN ORGANIZED HEALTH CARE EDUCATION/TRAINING PROGRAM

## 2025-07-29 PROCEDURE — 94761 N-INVAS EAR/PLS OXIMETRY MLT: CPT

## 2025-07-29 PROCEDURE — 86141 C-REACTIVE PROTEIN HS: CPT | Performed by: STUDENT IN AN ORGANIZED HEALTH CARE EDUCATION/TRAINING PROGRAM

## 2025-07-29 PROCEDURE — 99223 1ST HOSP IP/OBS HIGH 75: CPT | Mod: 57,,, | Performed by: STUDENT IN AN ORGANIZED HEALTH CARE EDUCATION/TRAINING PROGRAM

## 2025-07-29 PROCEDURE — 80053 COMPREHEN METABOLIC PANEL: CPT | Performed by: STUDENT IN AN ORGANIZED HEALTH CARE EDUCATION/TRAINING PROGRAM

## 2025-07-29 PROCEDURE — 27000177 HC AIRWAY, LARYNGEAL MASK: Performed by: NURSE ANESTHETIST, CERTIFIED REGISTERED

## 2025-07-29 PROCEDURE — 87077 CULTURE AEROBIC IDENTIFY: CPT | Performed by: STUDENT IN AN ORGANIZED HEALTH CARE EDUCATION/TRAINING PROGRAM

## 2025-07-29 PROCEDURE — 87186 SC STD MICRODIL/AGAR DIL: CPT | Performed by: STUDENT IN AN ORGANIZED HEALTH CARE EDUCATION/TRAINING PROGRAM

## 2025-07-29 PROCEDURE — 85730 THROMBOPLASTIN TIME PARTIAL: CPT | Performed by: STUDENT IN AN ORGANIZED HEALTH CARE EDUCATION/TRAINING PROGRAM

## 2025-07-29 PROCEDURE — 27000716 HC OXISENSOR PROBE, ANY SIZE: Performed by: NURSE ANESTHETIST, CERTIFIED REGISTERED

## 2025-07-29 PROCEDURE — 82962 GLUCOSE BLOOD TEST: CPT

## 2025-07-29 PROCEDURE — 99999 PR PBB SHADOW E&M-EST. PATIENT-LVL IV: CPT | Mod: PBBFAC,,, | Performed by: STUDENT IN AN ORGANIZED HEALTH CARE EDUCATION/TRAINING PROGRAM

## 2025-07-29 PROCEDURE — 63600175 PHARM REV CODE 636 W HCPCS: Performed by: NURSE ANESTHETIST, CERTIFIED REGISTERED

## 2025-07-29 PROCEDURE — 85610 PROTHROMBIN TIME: CPT | Performed by: STUDENT IN AN ORGANIZED HEALTH CARE EDUCATION/TRAINING PROGRAM

## 2025-07-29 PROCEDURE — 63600175 PHARM REV CODE 636 W HCPCS

## 2025-07-29 PROCEDURE — 99214 OFFICE O/P EST MOD 30 MIN: CPT | Mod: PBBFAC | Performed by: STUDENT IN AN ORGANIZED HEALTH CARE EDUCATION/TRAINING PROGRAM

## 2025-07-29 PROCEDURE — 88311 DECALCIFY TISSUE: CPT | Mod: 26,,, | Performed by: PATHOLOGY

## 2025-07-29 PROCEDURE — 36415 COLL VENOUS BLD VENIPUNCTURE: CPT | Performed by: STUDENT IN AN ORGANIZED HEALTH CARE EDUCATION/TRAINING PROGRAM

## 2025-07-29 PROCEDURE — 63600175 PHARM REV CODE 636 W HCPCS: Performed by: EMERGENCY MEDICINE

## 2025-07-29 PROCEDURE — 85025 COMPLETE CBC W/AUTO DIFF WBC: CPT | Performed by: EMERGENCY MEDICINE

## 2025-07-29 PROCEDURE — 94799 UNLISTED PULMONARY SVC/PX: CPT

## 2025-07-29 PROCEDURE — 25000003 PHARM REV CODE 250: Performed by: EMERGENCY MEDICINE

## 2025-07-29 PROCEDURE — 81003 URINALYSIS AUTO W/O SCOPE: CPT | Performed by: STUDENT IN AN ORGANIZED HEALTH CARE EDUCATION/TRAINING PROGRAM

## 2025-07-29 PROCEDURE — 36000706: Performed by: STUDENT IN AN ORGANIZED HEALTH CARE EDUCATION/TRAINING PROGRAM

## 2025-07-29 PROCEDURE — 28820 AMPUTATION OF TOE: CPT | Mod: T8,,, | Performed by: STUDENT IN AN ORGANIZED HEALTH CARE EDUCATION/TRAINING PROGRAM

## 2025-07-29 PROCEDURE — 27000655: Performed by: NURSE ANESTHETIST, CERTIFIED REGISTERED

## 2025-07-29 PROCEDURE — 87040 BLOOD CULTURE FOR BACTERIA: CPT | Performed by: STUDENT IN AN ORGANIZED HEALTH CARE EDUCATION/TRAINING PROGRAM

## 2025-07-29 RX ORDER — ACETAMINOPHEN 325 MG/1
650 TABLET ORAL EVERY 8 HOURS PRN
Status: DISCONTINUED | OUTPATIENT
Start: 2025-07-29 | End: 2025-08-04 | Stop reason: HOSPADM

## 2025-07-29 RX ORDER — LOSARTAN POTASSIUM 50 MG/1
50 TABLET ORAL DAILY
Status: DISCONTINUED | OUTPATIENT
Start: 2025-07-29 | End: 2025-08-04 | Stop reason: HOSPADM

## 2025-07-29 RX ORDER — ONDANSETRON HYDROCHLORIDE 2 MG/ML
INJECTION, SOLUTION INTRAVENOUS
Status: DISCONTINUED | OUTPATIENT
Start: 2025-07-29 | End: 2025-07-29

## 2025-07-29 RX ORDER — GLUCAGON 1 MG
1 KIT INJECTION
Status: DISCONTINUED | OUTPATIENT
Start: 2025-07-29 | End: 2025-08-04 | Stop reason: HOSPADM

## 2025-07-29 RX ORDER — ENOXAPARIN SODIUM 100 MG/ML
40 INJECTION SUBCUTANEOUS EVERY 24 HOURS
Status: DISCONTINUED | OUTPATIENT
Start: 2025-07-30 | End: 2025-08-04 | Stop reason: HOSPADM

## 2025-07-29 RX ORDER — DIPHENHYDRAMINE HYDROCHLORIDE 50 MG/ML
25 INJECTION, SOLUTION INTRAMUSCULAR; INTRAVENOUS EVERY 6 HOURS PRN
Status: DISCONTINUED | OUTPATIENT
Start: 2025-07-29 | End: 2025-07-29 | Stop reason: HOSPADM

## 2025-07-29 RX ORDER — AMLODIPINE BESYLATE 5 MG/1
5 TABLET ORAL DAILY
Status: DISCONTINUED | OUTPATIENT
Start: 2025-07-29 | End: 2025-08-04 | Stop reason: HOSPADM

## 2025-07-29 RX ORDER — GLUCAGON 1 MG
1 KIT INJECTION
Status: DISCONTINUED | OUTPATIENT
Start: 2025-07-29 | End: 2025-07-29 | Stop reason: HOSPADM

## 2025-07-29 RX ORDER — ONDANSETRON HYDROCHLORIDE 2 MG/ML
4 INJECTION, SOLUTION INTRAVENOUS DAILY PRN
Status: DISCONTINUED | OUTPATIENT
Start: 2025-07-29 | End: 2025-07-29 | Stop reason: HOSPADM

## 2025-07-29 RX ORDER — IPRATROPIUM BROMIDE AND ALBUTEROL SULFATE 2.5; .5 MG/3ML; MG/3ML
3 SOLUTION RESPIRATORY (INHALATION) ONCE AS NEEDED
Status: DISCONTINUED | OUTPATIENT
Start: 2025-07-29 | End: 2025-07-29 | Stop reason: HOSPADM

## 2025-07-29 RX ORDER — PROPOFOL 10 MG/ML
VIAL (ML) INTRAVENOUS
Status: DISCONTINUED | OUTPATIENT
Start: 2025-07-29 | End: 2025-07-29

## 2025-07-29 RX ORDER — HYDROMORPHONE HYDROCHLORIDE 2 MG/ML
0.5 INJECTION, SOLUTION INTRAMUSCULAR; INTRAVENOUS; SUBCUTANEOUS EVERY 5 MIN PRN
Status: DISCONTINUED | OUTPATIENT
Start: 2025-07-29 | End: 2025-07-29 | Stop reason: HOSPADM

## 2025-07-29 RX ORDER — MORPHINE SULFATE 4 MG/ML
4 INJECTION, SOLUTION INTRAMUSCULAR; INTRAVENOUS EVERY 4 HOURS PRN
Refills: 0 | Status: DISCONTINUED | OUTPATIENT
Start: 2025-07-29 | End: 2025-08-04 | Stop reason: HOSPADM

## 2025-07-29 RX ORDER — SODIUM CHLORIDE 9 MG/ML
INJECTION, SOLUTION INTRAVENOUS CONTINUOUS
Status: DISPENSED | OUTPATIENT
Start: 2025-07-29 | End: 2025-07-30

## 2025-07-29 RX ORDER — MUPIROCIN 20 MG/G
OINTMENT TOPICAL 2 TIMES DAILY
Status: COMPLETED | OUTPATIENT
Start: 2025-07-29 | End: 2025-08-02

## 2025-07-29 RX ORDER — INSULIN ASPART 100 [IU]/ML
0-5 INJECTION, SOLUTION INTRAVENOUS; SUBCUTANEOUS
Status: DISCONTINUED | OUTPATIENT
Start: 2025-07-29 | End: 2025-08-03

## 2025-07-29 RX ORDER — NALOXONE HCL 0.4 MG/ML
0.02 VIAL (ML) INJECTION
Status: DISCONTINUED | OUTPATIENT
Start: 2025-07-29 | End: 2025-08-04 | Stop reason: HOSPADM

## 2025-07-29 RX ORDER — FENTANYL CITRATE 50 UG/ML
INJECTION, SOLUTION INTRAMUSCULAR; INTRAVENOUS
Status: DISCONTINUED | OUTPATIENT
Start: 2025-07-29 | End: 2025-07-29

## 2025-07-29 RX ORDER — SODIUM CHLORIDE 0.9 % (FLUSH) 0.9 %
10 SYRINGE (ML) INJECTION EVERY 12 HOURS PRN
Status: DISCONTINUED | OUTPATIENT
Start: 2025-07-29 | End: 2025-08-04 | Stop reason: HOSPADM

## 2025-07-29 RX ORDER — MORPHINE SULFATE 10 MG/ML
4 INJECTION INTRAMUSCULAR; INTRAVENOUS; SUBCUTANEOUS EVERY 5 MIN PRN
Status: DISCONTINUED | OUTPATIENT
Start: 2025-07-29 | End: 2025-07-29 | Stop reason: HOSPADM

## 2025-07-29 RX ORDER — ATORVASTATIN CALCIUM 40 MG/1
40 TABLET, FILM COATED ORAL DAILY
Status: DISCONTINUED | OUTPATIENT
Start: 2025-07-29 | End: 2025-08-04 | Stop reason: HOSPADM

## 2025-07-29 RX ORDER — ONDANSETRON HYDROCHLORIDE 2 MG/ML
4 INJECTION, SOLUTION INTRAVENOUS EVERY 8 HOURS PRN
Status: DISCONTINUED | OUTPATIENT
Start: 2025-07-29 | End: 2025-08-04 | Stop reason: HOSPADM

## 2025-07-29 RX ORDER — LIDOCAINE HYDROCHLORIDE 20 MG/ML
INJECTION, SOLUTION EPIDURAL; INFILTRATION; INTRACAUDAL; PERINEURAL
Status: DISCONTINUED | OUTPATIENT
Start: 2025-07-29 | End: 2025-07-29

## 2025-07-29 RX ORDER — POLYETHYLENE GLYCOL 3350 17 G/17G
17 POWDER, FOR SOLUTION ORAL 3 TIMES DAILY PRN
Status: DISCONTINUED | OUTPATIENT
Start: 2025-07-29 | End: 2025-08-04 | Stop reason: HOSPADM

## 2025-07-29 RX ORDER — HYDROCHLOROTHIAZIDE 12.5 MG/1
12.5 TABLET ORAL DAILY
Status: DISCONTINUED | OUTPATIENT
Start: 2025-07-29 | End: 2025-08-04 | Stop reason: HOSPADM

## 2025-07-29 RX ORDER — OXYCODONE HYDROCHLORIDE 5 MG/1
5 TABLET ORAL EVERY 6 HOURS PRN
Refills: 0 | Status: DISCONTINUED | OUTPATIENT
Start: 2025-07-29 | End: 2025-08-04 | Stop reason: HOSPADM

## 2025-07-29 RX ORDER — LOSARTAN POTASSIUM AND HYDROCHLOROTHIAZIDE 12.5; 5 MG/1; MG/1
1 TABLET ORAL DAILY
Status: DISCONTINUED | OUTPATIENT
Start: 2025-07-29 | End: 2025-07-29

## 2025-07-29 RX ORDER — IBUPROFEN 200 MG
16 TABLET ORAL
Status: DISCONTINUED | OUTPATIENT
Start: 2025-07-29 | End: 2025-08-04 | Stop reason: HOSPADM

## 2025-07-29 RX ORDER — CEFAZOLIN SODIUM 2 G/50ML
2 SOLUTION INTRAVENOUS
OUTPATIENT
Start: 2025-07-29

## 2025-07-29 RX ORDER — IBUPROFEN 200 MG
24 TABLET ORAL
Status: DISCONTINUED | OUTPATIENT
Start: 2025-07-29 | End: 2025-08-04 | Stop reason: HOSPADM

## 2025-07-29 RX ORDER — SODIUM CHLORIDE 9 MG/ML
INJECTION, SOLUTION INTRAVENOUS CONTINUOUS
OUTPATIENT
Start: 2025-07-29

## 2025-07-29 RX ADMIN — SODIUM CHLORIDE: 9 INJECTION, SOLUTION INTRAVENOUS at 11:07

## 2025-07-29 RX ADMIN — INSULIN HUMAN 10 UNITS: 100 INJECTION, SUSPENSION SUBCUTANEOUS at 12:07

## 2025-07-29 RX ADMIN — MUPIROCIN: 20 OINTMENT TOPICAL at 11:07

## 2025-07-29 RX ADMIN — HYDROCHLOROTHIAZIDE 12.5 MG: 12.5 TABLET ORAL at 11:07

## 2025-07-29 RX ADMIN — LOSARTAN POTASSIUM 50 MG: 50 TABLET, FILM COATED ORAL at 11:07

## 2025-07-29 RX ADMIN — AMLODIPINE BESYLATE 5 MG: 5 TABLET ORAL at 11:07

## 2025-07-29 RX ADMIN — INSULIN ASPART 1 UNITS: 100 INJECTION, SOLUTION INTRAVENOUS; SUBCUTANEOUS at 10:07

## 2025-07-29 RX ADMIN — PROPOFOL 170 MG: 10 INJECTION, EMULSION INTRAVENOUS at 03:07

## 2025-07-29 RX ADMIN — SODIUM CHLORIDE 2000 MG: 9 INJECTION, SOLUTION INTRAVENOUS at 01:07

## 2025-07-29 RX ADMIN — PIPERACILLIN SODIUM AND TAZOBACTAM SODIUM 4.5 G: 4; .5 INJECTION, POWDER, LYOPHILIZED, FOR SOLUTION INTRAVENOUS at 12:07

## 2025-07-29 RX ADMIN — SODIUM CHLORIDE: 9 INJECTION, SOLUTION INTRAVENOUS at 12:07

## 2025-07-29 RX ADMIN — OXYCODONE HYDROCHLORIDE 5 MG: 5 TABLET ORAL at 09:07

## 2025-07-29 RX ADMIN — LIDOCAINE HYDROCHLORIDE 50 MG: 20 INJECTION, SOLUTION EPIDURAL; INFILTRATION; INTRACAUDAL; PERINEURAL at 03:07

## 2025-07-29 RX ADMIN — FENTANYL CITRATE 100 MCG: 50 INJECTION, SOLUTION INTRAMUSCULAR; INTRAVENOUS at 03:07

## 2025-07-29 RX ADMIN — ONDANSETRON 4 MG: 2 INJECTION INTRAMUSCULAR; INTRAVENOUS at 03:07

## 2025-07-29 RX ADMIN — MUPIROCIN: 20 OINTMENT TOPICAL at 09:07

## 2025-07-29 RX ADMIN — ATORVASTATIN CALCIUM 40 MG: 40 TABLET, FILM COATED ORAL at 11:07

## 2025-07-29 RX ADMIN — INSULIN HUMAN 10 UNITS: 100 INJECTION, SUSPENSION SUBCUTANEOUS at 09:07

## 2025-07-29 RX ADMIN — PIPERACILLIN SODIUM AND TAZOBACTAM SODIUM 4.5 G: 4; .5 INJECTION, POWDER, FOR SOLUTION INTRAVENOUS at 06:07

## 2025-07-29 NOTE — ASSESSMENT & PLAN NOTE
"Patient's most recent potassium results are listed below.   No results for input(s): "K" in the last 72 hours.  Plan  - Replete potassium per protocol  - Monitor potassium Daily  - Patient's hypokalemia is stable  - follow  "

## 2025-07-29 NOTE — ANESTHESIA POSTPROCEDURE EVALUATION
Anesthesia Post Evaluation    Patient: Wallace Pedro    Procedure(s) Performed: Procedure(s) (LRB):  DEBRIDEMENT, FOOT (Right)  AMPUTATION, 4th toe (Right)    Final Anesthesia Type: general      Patient location during evaluation: PACU  Patient participation: Yes- Able to Participate  Level of consciousness: awake and alert and oriented  Post-procedure vital signs: reviewed and stable  Pain management: adequate  Airway patency: patent  MARLIN mitigation strategies: Multimodal analgesia  PONV status at discharge: No PONV  Anesthetic complications: no      Cardiovascular status: hemodynamically stable  Respiratory status: unassisted and spontaneous ventilation  Hydration status: euvolemic  Follow-up not needed.              Vitals Value Taken Time   /64 07/29/25 16:14   Temp 36.6 °C (97.8 °F) 07/29/25 16:12   Pulse 81 07/29/25 16:15   Resp 12 07/29/25 16:15   SpO2 99 % 07/29/25 16:15   Vitals shown include unfiled device data.      No case tracking events are documented in the log.      Pain/Luis Alfredo Score: No data recorded

## 2025-07-29 NOTE — OP NOTE
[facility]  Surgery Department  Operative Note    SUMMARY     Date of Procedure: 7/29/2025     Procedure: Procedure(s) (LRB):  DEBRIDEMENT, FOOT (Right)  AMPUTATION, 4th toe (Right)     Surgeons and Role:     * Moises Varela, DO - Primary    Assisting Surgeon: None    Pre-Operative Diagnosis: Cellulitis of multiple sites of lower extremity [L03.119]  Foot ulcer with necrosis of muscle, right [L97.513]    Post-Operative Diagnosis: Post-Op Diagnosis Codes:     * Cellulitis of multiple sites of lower extremity [L03.119]     * Foot ulcer with necrosis of muscle, right [L97.513]    Anesthesia: General    Operative Findings (including complications, if any):  Right 4th toe muscle and bone necrosis; large amount of purulent drainage and muscle necrosis on the plantar and dorsal surface of the foot; requiring debridement, amputation of the 4th digit and 4th metatarsal head    Description of Technical Procedures:  Patient was taken the operating room and placed on the operating table in the supine position.  Patient underwent general anesthesia per the anesthesia team.  The right foot was prepped and draped in usual sterile fashion.  Patient had a previous Penrose drain in place from previous surgery from abscess; this Penrose drain was removed.  There was a significant amount of purulent drainage in the dorsal surface of the foot that tracked to the plantar surface.  Significant skin and muscle necrosis around in the 4th toe at its base; we elected to amputate the 4th toe and circumferentially dissected with electrocautery and excise this toe.  We dissected into the metatarsophalangeal joint at the 4th digit, transected the toe and sent the toe to pathology.  There was significant muscle necrosis around the head of the metatarsal; we debrided around the muscle and transected the head of the metatarsal and sent this to pathology.  We took several bone cultures and sent to microbiology.  We took  wound cultures and sent to microbiology.  We debrided the cavity to healthy muscle and healthy bone; the wound measured 7 x 3 by 4 cm.  We irrigated the cavity with Dakin solution and suctioned clear, bleeding controlled electrocautery.  We packed with gauze, Kerlix and covered with a Ace wrap.  Patient was awakened, extubated and taken the PACU in stable condition.  Patient tolerated procedure well.      Estimated Blood Loss (EBL): 10cc           Implants: * No implants in log *    Specimens:   Specimen (24h ago, onward)       Start     Ordered    07/29/25 1545  Surgical Pathology  RELEASE UPON ORDERING         07/29/25 1545    07/29/25 1535  Surgical Pathology  RELEASE UPON ORDERING,   Status:  Canceled         07/29/25 1535                   ID Type Source Tests Collected by Time Destination   1 : 4th toe right foot, metatarsal head Tissue Toe, Right Foot SURGICAL PATHOLOGY Moises Varela, DO 7/29/2025 1544               Condition: Good    Disposition: PACU - hemodynamically stable.    Attestation: Op Note Attestation: I was physically present and scrubbed for the entire procedure.

## 2025-07-29 NOTE — SUBJECTIVE & OBJECTIVE
Past Medical History:   Diagnosis Date    Diabetes mellitus, type 2     Hyperlipidemia     Hypertension        Past Surgical History:   Procedure Laterality Date    DEBRIDEMENT OF FOOT Right 2025    Procedure: DEBRIDEMENT, FOOT;  Surgeon: Moises Varela DO;  Location: [facility];  Service: General;  Laterality: Right;       Review of patient's allergies indicates:  No Known Allergies    No current facility-administered medications on file prior to encounter.     Current Outpatient Medications on File Prior to Encounter   Medication Sig    alcohol swabs (ALCOHOL PADS) PadM Apply 1 each topically as needed. (Patient not taking: Reported on 2025)    amLODIPine (NORVASC) 5 MG tablet Take 1 tablet (5 mg total) by mouth once daily.    atorvastatin (LIPITOR) 40 MG tablet Take 1 tablet (40 mg total) by mouth once daily.    doxycycline (VIBRA-TABS) 100 MG tablet Take 1 tablet (100 mg total) by mouth every 12 (twelve) hours. for 14 days    insulin NPH-insulin regular, 70/30, (HUMULIN 70/30 U-100 INSULIN) 100 unit/mL (70-30) injection Inject 10 Units into the skin 2 (two) times a day.    insulin syringe-needle U-100 0.3 mL 29 gauge Syrg 1 Needle by Misc.(Non-Drug; Combo Route) route 2 (two) times a day.    losartan-hydrochlorothiazide 50-12.5 mg (HYZAAR) 50-12.5 mg per tablet Take 1 tablet by mouth once daily.    metFORMIN (GLUCOPHAGE) 500 MG tablet Take 1 tablet (500 mg total) by mouth 2 (two) times daily with meals.     Family History       Problem Relation (Age of Onset)    Diabetes Father    Heart disease Mother    Hypertension Father          Tobacco Use    Smoking status: Every Day     Current packs/day: 0.00     Types: Vaping with nicotine, Cigarettes     Last attempt to quit: 2019     Years since quittin.5    Smokeless tobacco: Never   Vaping Use    Vaping status: Unknown   Substance and Sexual Activity    Alcohol use: Yes     Comment: 6 pack a day    Drug use: Never    Sexual activity: Yes      Partners: Female     Review of Systems   Constitutional:  Negative for chills, fatigue, fever and unexpected weight change.   HENT:  Negative for congestion, mouth sores and sore throat.    Eyes:  Negative for photophobia and visual disturbance.   Respiratory:  Negative for cough, chest tightness, shortness of breath and wheezing.    Cardiovascular:  Negative for chest pain, palpitations and leg swelling.   Gastrointestinal:  Negative for abdominal pain, diarrhea, nausea and vomiting.   Endocrine: Negative for cold intolerance and heat intolerance.   Genitourinary:  Negative for difficulty urinating, dysuria, frequency and urgency.   Musculoskeletal:  Negative for arthralgias, back pain and myalgias.   Skin:  Positive for wound. Negative for pallor and rash.   Neurological:  Negative for tremors, seizures, syncope, weakness, numbness and headaches.   Hematological:  Does not bruise/bleed easily.   Psychiatric/Behavioral:  Negative for agitation, confusion, hallucinations and suicidal ideas.      Objective:     Vital Signs (Most Recent):  Temp: 98.5 °F (36.9 °C) (07/29/25 0951)  Pulse: 95 (07/29/25 0951)  Resp: 17 (07/29/25 0951)  BP: (!) 137/90 (07/29/25 0951)  SpO2: 97 % (07/29/25 0951) Vital Signs (24h Range):  Temp:  [98.5 °F (36.9 °C)] 98.5 °F (36.9 °C)  Pulse:  [95] 95  Resp:  [17] 17  SpO2:  [97 %] 97 %  BP: (137)/(90) 137/90     Weight: 102.5 kg (226 lb)  Body mass index is 33.37 kg/m².     Physical Exam  Vitals reviewed.   Constitutional:       Appearance: Normal appearance.   HENT:      Head: Normocephalic and atraumatic.      Nose: Nose normal.   Eyes:      Conjunctiva/sclera: Conjunctivae normal.   Neck:      Trachea: Trachea normal.   Cardiovascular:      Rate and Rhythm: Normal rate and regular rhythm.      Pulses: Normal pulses.      Heart sounds: Normal heart sounds.   Pulmonary:      Effort: Pulmonary effort is normal.      Breath sounds: Normal breath sounds and air entry.   Abdominal:       Palpations: Abdomen is soft.   Musculoskeletal:      Comments: Wound right foot   Skin:     General: Skin is warm and dry.   Neurological:      General: No focal deficit present.      Mental Status: He is alert and oriented to person, place, and time.      Comments: Grossly normal motor and sensory function without focal deficit appreciated.   Psychiatric:         Mood and Affect: Mood and affect normal.         Behavior: Behavior is cooperative.                Significant Labs: All pertinent labs within the past 24 hours have been reviewed.    Significant Imaging: I have reviewed all pertinent imaging results/findings within the past 24 hours.

## 2025-07-29 NOTE — PHARMACY MED REC
"Admission Medication History     The home medication history was taken by Lorena Murillo.    You may go to "Admission" then "Reconcile Home Medications" tabs to review and/or act upon these items.     The home medication list has been updated by the Pharmacy department.   Please read ALL comments highlighted in yellow.   Please address this information as you see fit.    Feel free to contact us if you have any questions or require assistance.    Medications listed below were obtained from: Patient/family and Analytic software- [employer]  (Not in a hospital admission)        Current Outpatient Medications on File Prior to Encounter   Medication Sig Note Dispense Refill Last Dose/Taking    doxycycline (VIBRA-TABS) 100 MG tablet Take 1 tablet (100 mg total) by mouth every 12 (twelve) hours. for 14 days  28 tablet 0 7/29/2025    insulin NPH-insulin regular, 70/30, (HUMULIN 70/30 U-100 INSULIN) 100 unit/mL (70-30) injection Inject 10 Units into the skin 2 (two) times a day.  10 mL 11 7/29/2025    amLODIPine (NORVASC) 5 MG tablet Take 1 tablet (5 mg total) by mouth once daily. 7/29/2025: Patient not taking 30 tablet 0     atorvastatin (LIPITOR) 40 MG tablet Take 1 tablet (40 mg total) by mouth once daily. 7/29/2025: Patient not taking 30 tablet 0     insulin syringe-needle U-100 0.3 mL 29 gauge Syrg 1 Needle by Misc.(Non-Drug; Combo Route) route 2 (two) times a day.  100 each 11     losartan-hydrochlorothiazide 50-12.5 mg (HYZAAR) 50-12.5 mg per tablet Take 1 tablet by mouth once daily. 7/29/2025: Patient not taking 30 tablet 0     metFORMIN (GLUCOPHAGE) 500 MG tablet Take 1 tablet (500 mg total) by mouth 2 (two) times daily with meals. 7/29/2025: Patient not taking 60 tablet 0     [DISCONTINUED] alcohol swabs (ALCOHOL PADS) PadM Apply 1 each topically as needed. (Patient not taking: Reported on 7/29/2025)  200 each 11          Potential issues to be addressed PRIOR TO DISCHARGE  Please discuss with the patient barriers to " adherence with medication treatment plans  Patient requires education regarding drug therapies     Patient mentioned prior to everything going on with his wound, he had started seeing a new doctor. He's uncertain whether he's supposed to be on any other medications besides the insulin and antibiotic he's been taking. Based on this, I noted that the patient wasn't taking any blood pressure medications or Metformin, but I did not remove them from his profile.      Lorena Murillo  Medication Access Specialist  [id number]    .

## 2025-07-29 NOTE — H&P
[facility] - Emergency Department  Hospital Medicine  History & Physical    Patient Name: Wallace Pedro  MRN: [id number]  Patient Class: IP- Inpatient  Admission Date: 7/29/2025  Attending Physician: Ferdinand Drummond MD   Primary Care Provider: Jamilah Primary Doctor         Patient information was obtained from patient, past medical records, and ER records.     Subjective:     Principal Problem:Diabetic foot ulcer    Chief Complaint:   Chief Complaint   Patient presents with    Wound Check     Patient presents to the ED from Dr. Major's office with c/o wound to right foot. Patient states Dr. Major wants patient admitted for surgery to removed his fourth digit on his right foot due to necrosis.           HPI: 49-year-old white male with a past medical history of insulin dependent diabetes, diabetic foot wound in the right foot, diabetic neuropathy, diabetic dyslipidemia hypertension obesity presents to the ED after evaluation in surgery clinic today.  Patient had a debridement of a right foot wound a few weeks ago.  Was kept in-house on IV antibiotics for several days and discharged home with doxycycline.  Patient has been taking his antibiotic.  His girlfriend has been helping him with wound care.  Over the last few days the wound has been to drain more in developed a worsening odor.  Presented to surgery Clinic today for follow up.  Sent to the ED for admission and debridement and IV antibiotics this afternoon.  Review of systems otherwise negative    Past Medical History:   Diagnosis Date    Diabetes mellitus, type 2     Hyperlipidemia     Hypertension        Past Surgical History:   Procedure Laterality Date    DEBRIDEMENT OF FOOT Right 7/13/2025    Procedure: DEBRIDEMENT, FOOT;  Surgeon: Moises Varela DO;  Location: [facility] OR;  Service: General;  Laterality: Right;       Review of patient's allergies indicates:  No Known Allergies    No current facility-administered medications on file prior to  encounter.     Current Outpatient Medications on File Prior to Encounter   Medication Sig    alcohol swabs (ALCOHOL PADS) PadM Apply 1 each topically as needed. (Patient not taking: Reported on 2025)    amLODIPine (NORVASC) 5 MG tablet Take 1 tablet (5 mg total) by mouth once daily.    atorvastatin (LIPITOR) 40 MG tablet Take 1 tablet (40 mg total) by mouth once daily.    doxycycline (VIBRA-TABS) 100 MG tablet Take 1 tablet (100 mg total) by mouth every 12 (twelve) hours. for 14 days    insulin NPH-insulin regular, 70/30, (HUMULIN 70/30 U-100 INSULIN) 100 unit/mL (70-30) injection Inject 10 Units into the skin 2 (two) times a day.    insulin syringe-needle U-100 0.3 mL 29 gauge Syrg 1 Needle by Misc.(Non-Drug; Combo Route) route 2 (two) times a day.    losartan-hydrochlorothiazide 50-12.5 mg (HYZAAR) 50-12.5 mg per tablet Take 1 tablet by mouth once daily.    metFORMIN (GLUCOPHAGE) 500 MG tablet Take 1 tablet (500 mg total) by mouth 2 (two) times daily with meals.     Family History       Problem Relation (Age of Onset)    Diabetes Father    Heart disease Mother    Hypertension Father          Tobacco Use    Smoking status: Every Day     Current packs/day: 0.00     Types: Vaping with nicotine, Cigarettes     Last attempt to quit: 2019     Years since quittin.5    Smokeless tobacco: Never   Vaping Use    Vaping status: Unknown   Substance and Sexual Activity    Alcohol use: Yes     Comment: 6 pack a day    Drug use: Never    Sexual activity: Yes     Partners: Female     Review of Systems   Constitutional:  Negative for chills, fatigue, fever and unexpected weight change.   HENT:  Negative for congestion, mouth sores and sore throat.    Eyes:  Negative for photophobia and visual disturbance.   Respiratory:  Negative for cough, chest tightness, shortness of breath and wheezing.    Cardiovascular:  Negative for chest pain, palpitations and leg swelling.   Gastrointestinal:  Negative for abdominal pain,  diarrhea, nausea and vomiting.   Endocrine: Negative for cold intolerance and heat intolerance.   Genitourinary:  Negative for difficulty urinating, dysuria, frequency and urgency.   Musculoskeletal:  Negative for arthralgias, back pain and myalgias.   Skin:  Positive for wound. Negative for pallor and rash.   Neurological:  Negative for tremors, seizures, syncope, weakness, numbness and headaches.   Hematological:  Does not bruise/bleed easily.   Psychiatric/Behavioral:  Negative for agitation, confusion, hallucinations and suicidal ideas.      Objective:     Vital Signs (Most Recent):  Temp: 98.5 °F (36.9 °C) (07/29/25 0951)  Pulse: 95 (07/29/25 0951)  Resp: 17 (07/29/25 0951)  BP: (!) 137/90 (07/29/25 0951)  SpO2: 97 % (07/29/25 0951) Vital Signs (24h Range):  Temp:  [98.5 °F (36.9 °C)] 98.5 °F (36.9 °C)  Pulse:  [95] 95  Resp:  [17] 17  SpO2:  [97 %] 97 %  BP: (137)/(90) 137/90     Weight: 102.5 kg (226 lb)  Body mass index is 33.37 kg/m².     Physical Exam  Vitals reviewed.   Constitutional:       Appearance: Normal appearance.   HENT:      Head: Normocephalic and atraumatic.      Nose: Nose normal.   Eyes:      Conjunctiva/sclera: Conjunctivae normal.   Neck:      Trachea: Trachea normal.   Cardiovascular:      Rate and Rhythm: Normal rate and regular rhythm.      Pulses: Normal pulses.      Heart sounds: Normal heart sounds.   Pulmonary:      Effort: Pulmonary effort is normal.      Breath sounds: Normal breath sounds and air entry.   Abdominal:      Palpations: Abdomen is soft.   Musculoskeletal:      Comments: Wound right foot   Skin:     General: Skin is warm and dry.   Neurological:      General: No focal deficit present.      Mental Status: He is alert and oriented to person, place, and time.      Comments: Grossly normal motor and sensory function without focal deficit appreciated.   Psychiatric:         Mood and Affect: Mood and affect normal.         Behavior: Behavior is cooperative.               "  Significant Labs: All pertinent labs within the past 24 hours have been reviewed.    Significant Imaging: I have reviewed all pertinent imaging results/findings within the past 24 hours.  Assessment/Plan:     Assessment & Plan  Diabetic foot ulcer  Patient's FSGs are uncontrolled due to hyperglycemia on current medication regimen.  Last A1c reviewed-   Lab Results   Component Value Date    HGBA1C 11.6 (H) 07/02/2025     Most recent fingerstick glucose reviewed- No results for input(s): "POCTGLUCOSE" in the last 24 hours.  Current correctional scale  Low  Maintain anti-hyperglycemic dose as follows-   Antihyperglycemics (From admission, onward)      Start     Stop Route Frequency Ordered    07/29/25 1148  insulin aspart U-100 injection 0-5 Units         -- SubQ Before meals & nightly PRN 07/29/25 1050    07/29/25 1100  insulin NPH-insulin regular (70/30) injection 10 Units         -- SubQ 2 times daily 07/29/25 1050          Hold Oral hypoglycemics while patient is in the hospital.  Essential hypertension  Patient's blood pressure range in the last 24 hours was: BP  Min: 137/90  Max: 137/90.The patient's inpatient anti-hypertensive regimen is listed below:  Current Antihypertensives  amLODIPine tablet 5 mg, Daily, Oral  losartan-hydrochlorothiazide 50-12.5 mg per tablet 1 tablet, Daily, Oral    Plan  - BP is controlled, no changes needed to their regimen  - follow  Cellulitis of multiple sites of lower extremity  IV antibiotics surgical debridement    Type 2 diabetes mellitus with hyperglycemia, without long-term current use of insulin  We will maintain home insulin regimen given patient is self-pay.  Try and optimize his 70 30 regimen  We will add sliding scale.  Other hyperlipidemia  Statin    Foot ulcer with necrosis of muscle, right  Debridement today    Diabetic peripheral vascular disease  Glycemic control statin    Diabetic nephropathy  Glycemic control continue Arb    Obesity (BMI 30-39.9)  Body mass index " "is 33.37 kg/m². Morbid obesity complicates all aspects of disease management from diagnostic modalities to treatment. Weight loss encouraged and health benefits explained to patient.       DM type 2 with diabetic dyslipidemia  Patient's FSGs are uncontrolled due to hyperglycemia on current medication regimen.  Last A1c reviewed-   Lab Results   Component Value Date    HGBA1C 11.6 (H) 07/02/2025     Most recent fingerstick glucose reviewed- No results for input(s): "POCTGLUCOSE" in the last 24 hours.  Current correctional scale  Low  Maintain anti-hyperglycemic dose as follows-   Antihyperglycemics (From admission, onward)      Start     Stop Route Frequency Ordered    07/29/25 1148  insulin aspart U-100 injection 0-5 Units         -- SubQ Before meals & nightly PRN 07/29/25 1050    07/29/25 1100  insulin NPH-insulin regular (70/30) injection 10 Units         -- SubQ 2 times daily 07/29/25 1050          Hold Oral hypoglycemics while patient is in the hospital.  VTE Risk Mitigation (From admission, onward)           Ordered     enoxaparin injection 40 mg  Daily         07/29/25 1050     IP VTE HIGH RISK PATIENT  Once         07/29/25 1050     Place sequential compression device  Until discontinued         07/29/25 1050                                                Chacorta Yip DO  Department of Hospital Medicine  [facility] - Emergency Department          "

## 2025-07-29 NOTE — ANESTHESIA PREPROCEDURE EVALUATION
07/29/2025  Wallace Pedro is a 49 y.o., male.      Pre-op Assessment    I have reviewed the Patient Summary Reports.     I have reviewed the Nursing Notes. I have reviewed the NPO Status.   I have reviewed the Medications.     Review of Systems  Anesthesia Hx:  No problems with previous Anesthesia             Denies Family Hx of Anesthesia complications.    Denies Personal Hx of Anesthesia complications.                    Social:  Vaping, No Alcohol Use       Cardiovascular:     Hypertension           hyperlipidemia   ECG has been reviewed.                            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]       Cellulitis of multiple sites of lower extremity     Foot ulcer with necrosis of muscle, right       Past Medical History:   Diagnosis Date    Diabetes mellitus, type 2     Hyperlipidemia     Hypertension      Past Surgical History:   Procedure Laterality Date    DEBRIDEMENT OF FOOT Right 7/13/2025    Procedure: DEBRIDEMENT, FOOT;  Surgeon: Moises Varela DO;  Location: [facility];  Service: General;  Laterality: Right;         Physical Exam  General: Well nourished, Cooperative, Alert and Oriented    Airway:  Mallampati: II / II  Mouth Opening: Normal  TM Distance: Normal  Neck ROM: Normal ROM    Dental:  Intact    Chest/Lungs:  Clear to auscultation    Heart:  Rate: Normal  Rhythm: Regular Rhythm  Sounds: Normal        Chemistry        Component Value Date/Time     (L) 07/29/2025 1046    K 3.9 07/29/2025 1046    CL 99 07/29/2025 1046    CO2 26 07/29/2025 1046    BUN 9 07/29/2025 1046    CREATININE 0.78 07/29/2025 1046     (H) 07/29/2025 1046        Component Value Date/Time     CALCIUM 9.4 07/29/2025 1046    ALKPHOS 118 07/29/2025 1046    AST 19 07/29/2025 1046    ALT 9 07/29/2025 1046    BILITOT 0.4 07/29/2025 1046        Lab Results   Component Value Date    WBC 9.99 07/29/2025    HGB 14.3 07/29/2025    HCT 42.8 07/29/2025     07/29/2025     Results for orders placed or performed during the hospital encounter of 07/12/25   EKG 12-lead    Collection Time: 07/12/25  2:21 PM   Result Value Ref Range    QRS Duration 84 ms    [facility] QTC Calculation 404 ms    Narrative    Test Reason : Z13.6,    Vent. Rate : 117 BPM     Atrial Rate :    BPM     P-R Int : 172 ms          QRS Dur :  84 ms      QT Int : 304 ms       P-R-T Axes :  67 255  57 degrees    QTcB Int : 404 ms    Sinus tachycardia  Left anterior fascicular block  Poor R wave progression - cannot rule out anteroseptal infarct  Possible right ventricular hypertrophy  Abnormal ECG    Confirmed by Darleen Alva ([id number]) on 7/24/2025 9:33:45 PM    Referred By: AAAREFERRAL SELF           Confirmed By: Rehmat Artie     No results found for this or any previous visit.        Anesthesia Plan  Type of Anesthesia, risks & benefits discussed:    Anesthesia Type: Gen Supraglottic Airway  Intra-op Monitoring Plan: Standard ASA Monitors  Post Op Pain Control Plan: multimodal analgesia  Induction:  IV  Airway Plan: Direct  Informed Consent: Informed consent signed with the Patient and all parties understand the risks and agree with anesthesia plan.  All questions answered.   ASA Score: 3  Day of Surgery Review of History & Physical: H&P Update referred to the surgeon/provider.I have interviewed and examined the patient. I have reviewed the patient's H&P dated: There are no significant changes.   Anesthesia Plan Notes: ASA 3, GA-LMA, NPO 8 hours    Ready For Surgery From Anesthesia Perspective.     .

## 2025-07-29 NOTE — ASSESSMENT & PLAN NOTE
"Anemia is likely due to chronic infection. Most recent hemoglobin and hematocrit are listed below.  No results for input(s): "HGB", "HCT" in the last 72 hours.  Plan  - Monitor serial CBC: chronic infection  - Transfuse PRBC if patient becomes hemodynamically unstable, symptomatic or H/H drops below 7/21.  - Patient has not received any PRBC transfusions to date  - Patient's anemia is currently stable  - follow up  "

## 2025-07-29 NOTE — HPI
49-year-old white male with a past medical history of insulin dependent diabetes, diabetic foot wound in the right foot, diabetic neuropathy, diabetic dyslipidemia hypertension obesity presents to the ED after evaluation in surgery clinic today.  Patient had a debridement of a right foot wound a few weeks ago.  Was kept in-house on IV antibiotics for several days and discharged home with doxycycline.  Patient has been taking his antibiotic.  His girlfriend has been helping him with wound care.  Over the last few days the wound has been to drain more in developed a worsening odor.  Presented to surgery Clinic today for follow up.  Sent to the ED for admission and debridement and IV antibiotics this afternoon.  Review of systems otherwise negative

## 2025-07-29 NOTE — H&P (VIEW-ONLY)
History & Physical    Subjective     History of Present Illness:  49-year-old  male presents to the clinic for postop evaluation status post debridement of right foot.  Patient had a diabetic foot wound with positive cultures of MRSA, Bacteroides and Finegoldia magna; discharged on doxycycline.  Patient received IV antibiotics while in the hospital and has been discharged for few weeks.  Patient had a Penrose drain in place to allow area to continue to drain.  Patient states his girlfriend has been changing the dressing every day, but the foot has been draining more.  Increased smell and necrosis.  Patient states he ate breakfast this morning at 7:00 a.m..  Denies any chest pain/shortness of breath, nausea/vomiting/diarrhea, fever/chills.    Chief Complaint   Patient presents with    Post-op Evaluation     Post op foot wound        Review of patient's allergies indicates:  No Known Allergies    Current Medications[1]    Past Medical History:   Diagnosis Date    Diabetes mellitus, type 2     Hyperlipidemia     Hypertension      Past Surgical History:   Procedure Laterality Date    DEBRIDEMENT OF FOOT Right 7/13/2025    Procedure: DEBRIDEMENT, FOOT;  Surgeon: Moises Varela DO;  Location: [facility];  Service: General;  Laterality: Right;     Family History   Problem Relation Name Age of Onset    Heart disease Mother      Diabetes Father      Hypertension Father       Social History[2]     Review of Systems:  Review of Systems   Constitutional: Negative.  Negative for appetite change, chills, fever and unexpected weight change.   HENT: Negative.  Negative for trouble swallowing.    Eyes: Negative.    Respiratory: Negative.  Negative for chest tightness and shortness of breath.    Cardiovascular: Negative.  Negative for chest pain.   Gastrointestinal: Negative.  Negative for abdominal distention, abdominal pain, blood in stool and nausea.   Endocrine: Negative.    Genitourinary: Negative.  Negative for  "hematuria.   Musculoskeletal: Negative.  Negative for back pain and myalgias.   Skin: Negative.  Negative for color change.   Allergic/Immunologic: Negative.    Neurological: Negative.  Negative for dizziness, syncope, weakness and light-headedness.   Psychiatric/Behavioral: Negative.  Negative for agitation.           Objective     Vital Signs (Most Recent)     5' 9" (1.753 m)        Physical Exam:  Physical Exam  Constitutional:       General: He is not in acute distress.     Appearance: Normal appearance.   HENT:      Head: Normocephalic.   Cardiovascular:      Rate and Rhythm: Normal rate.   Pulmonary:      Effort: Pulmonary effort is normal. No respiratory distress.   Abdominal:      General: There is no distension.      Tenderness: There is no abdominal tenderness.   Musculoskeletal:         General: Normal range of motion.        Feet:    Feet:      Comments: Increased cellulitis, Penrose drain going through foot with purulent drainage; tissue necrosis to the 3rd and 2nd toe  Skin:     General: Skin is warm.      Coloration: Skin is not jaundiced.   Neurological:      General: No focal deficit present.      Mental Status: He is alert and oriented to person, place, and time.      Cranial Nerves: No cranial nerve deficit.                      Assessment and Plan   Increased right foot necrosis, toe necrosis    PLAN:    Patient will be sent to the ER for workup with right foot x-ray, lab draws, blood cultures.  Patient will need debridement later today.  Patient to be admitted to hospital per hospitalist for medical management; general surgery will follow that has consult for debridement/wound care              [1]   Current Outpatient Medications   Medication Sig Dispense Refill    amLODIPine (NORVASC) 5 MG tablet Take 1 tablet (5 mg total) by mouth once daily. 30 tablet 0    atorvastatin (LIPITOR) 40 MG tablet Take 1 tablet (40 mg total) by mouth once daily. 30 tablet 0    doxycycline (VIBRA-TABS) 100 MG " tablet Take 1 tablet (100 mg total) by mouth every 12 (twelve) hours. for 14 days 28 tablet 0    insulin NPH-insulin regular, 70/30, (HUMULIN 70/30 U-100 INSULIN) 100 unit/mL (70-30) injection Inject 10 Units into the skin 2 (two) times a day. 10 mL 11    insulin syringe-needle U-100 0.3 mL 29 gauge Syrg 1 Needle by Misc.(Non-Drug; Combo Route) route 2 (two) times a day. 100 each 11    losartan-hydrochlorothiazide 50-12.5 mg (HYZAAR) 50-12.5 mg per tablet Take 1 tablet by mouth once daily. 30 tablet 0    metFORMIN (GLUCOPHAGE) 500 MG tablet Take 1 tablet (500 mg total) by mouth 2 (two) times daily with meals. 60 tablet 0    alcohol swabs (ALCOHOL PADS) PadM Apply 1 each topically as needed. (Patient not taking: Reported on 2025) 200 each 11     No current facility-administered medications for this visit.   [2]   Social History  Tobacco Use    Smoking status: Every Day     Current packs/day: 0.00     Types: Vaping with nicotine, Cigarettes     Last attempt to quit: 2019     Years since quittin.5    Smokeless tobacco: Never   Substance Use Topics    Alcohol use: Yes     Comment: 6 pack a day    Drug use: Never

## 2025-07-29 NOTE — ASSESSMENT & PLAN NOTE
Body mass index is 33.37 kg/m². Morbid obesity complicates all aspects of disease management from diagnostic modalities to treatment. Weight loss encouraged and health benefits explained to patient.

## 2025-07-29 NOTE — ASSESSMENT & PLAN NOTE
We will maintain home insulin regimen given patient is self-pay.  Try and optimize his 70 30 regimen  We will add sliding scale.

## 2025-07-29 NOTE — TRANSFER OF CARE
"Anesthesia Transfer of Care Note    Patient: Wallace Pedro    Procedure(s) Performed: Procedure(s) (LRB):  DEBRIDEMENT, FOOT (Right)  AMPUTATION, 4th toe (Right)    Patient location: PACU    Anesthesia Type: general    Transport from OR: Transported from OR on 6-10 L/min O2 by face mask with adequate spontaneous ventilation    Post pain: adequate analgesia    Post assessment: no apparent anesthetic complications and tolerated procedure well    Post vital signs: stable    Level of consciousness: responds to stimulation and sedated    Nausea/Vomiting: no nausea/vomiting    Complications: none    Transfer of care protocol was followed      Last vitals: Visit Vitals  BP (!) 105/54 (BP Location: Right arm, Patient Position: Lying)   Pulse 82   Temp 36.6 °C (97.8 °F) (Oral)   Resp 12   Ht 5' 9" (1.753 m)   Wt 102.5 kg (226 lb)   SpO2 99%   BMI 33.37 kg/m²     "

## 2025-07-29 NOTE — INTERVAL H&P NOTE
The patient has been examined and the H&P has been reviewed:    I concur with the findings and no changes have occurred since H&P was written.    Anesthesia/Surgery risks, benefits and alternative options discussed and understood by patient/family.          Active Hospital Problems    Diagnosis  POA    *Diabetic foot ulcer [E11.621, L97.509]  Yes     Chronic    Obesity (BMI 30-39.9) [E66.9]  Yes    DM type 2 with diabetic dyslipidemia [E11.69, E78.5]  Yes    Diabetic peripheral vascular disease [E11.51]  Yes    Diabetic nephropathy [E11.21]  Yes    Foot ulcer with necrosis of muscle, right [L97.513]  Yes    Cellulitis of multiple sites of lower extremity [L03.119]  Yes    Type 2 diabetes mellitus with hyperglycemia, without long-term current use of insulin [E11.65]  Yes    Other hyperlipidemia [E78.49]  Yes    Essential hypertension [I10]  Yes      Resolved Hospital Problems   No resolved problems to display.

## 2025-07-29 NOTE — PROGRESS NOTES
History & Physical    Subjective     History of Present Illness:  49-year-old  male presents to the clinic for postop evaluation status post debridement of right foot.  Patient had a diabetic foot wound with positive cultures of MRSA, Bacteroides and Finegoldia magna; discharged on doxycycline.  Patient received IV antibiotics while in the hospital and has been discharged for few weeks.  Patient had a Penrose drain in place to allow area to continue to drain.  Patient states his girlfriend has been changing the dressing every day, but the foot has been draining more.  Increased smell and necrosis.  Patient states he ate breakfast this morning at 7:00 a.m..  Denies any chest pain/shortness of breath, nausea/vomiting/diarrhea, fever/chills.    Chief Complaint   Patient presents with    Post-op Evaluation     Post op foot wound        Review of patient's allergies indicates:  No Known Allergies    Current Medications[1]    Past Medical History:   Diagnosis Date    Diabetes mellitus, type 2     Hyperlipidemia     Hypertension      Past Surgical History:   Procedure Laterality Date    DEBRIDEMENT OF FOOT Right 7/13/2025    Procedure: DEBRIDEMENT, FOOT;  Surgeon: Moises Varela DO;  Location: [facility], [place];  Service: General;  Laterality: Right;     Family History   Problem Relation Name Age of Onset    Heart disease Mother      Diabetes Father      Hypertension Father       Social History[2]     Review of Systems:  Review of Systems   Constitutional: Negative.  Negative for appetite change, chills, fever and unexpected weight change.   HENT: Negative.  Negative for trouble swallowing.    Eyes: Negative.    Respiratory: Negative.  Negative for chest tightness and shortness of breath.    Cardiovascular: Negative.  Negative for chest pain.   Gastrointestinal: Negative.  Negative for abdominal distention, abdominal pain, blood in stool and nausea.   Endocrine: Negative.    Genitourinary: Negative.  Negative for  "hematuria.   Musculoskeletal: Negative.  Negative for back pain and myalgias.   Skin: Negative.  Negative for color change.   Allergic/Immunologic: Negative.    Neurological: Negative.  Negative for dizziness, syncope, weakness and light-headedness.   Psychiatric/Behavioral: Negative.  Negative for agitation.           Objective     Vital Signs (Most Recent)     5' 9" (1.753 m)        Physical Exam:  Physical Exam  Constitutional:       General: He is not in acute distress.     Appearance: Normal appearance.   HENT:      Head: Normocephalic.   Cardiovascular:      Rate and Rhythm: Normal rate.   Pulmonary:      Effort: Pulmonary effort is normal. No respiratory distress.   Abdominal:      General: There is no distension.      Tenderness: There is no abdominal tenderness.   Musculoskeletal:         General: Normal range of motion.        Feet:    Feet:      Comments: Increased cellulitis, Penrose drain going through foot with purulent drainage; tissue necrosis to the 3rd and 2nd toe  Skin:     General: Skin is warm.      Coloration: Skin is not jaundiced.   Neurological:      General: No focal deficit present.      Mental Status: He is alert and oriented to person, place, and time.      Cranial Nerves: No cranial nerve deficit.                      Assessment and Plan   Increased right foot necrosis, toe necrosis    PLAN:    Patient will be sent to the ER for workup with right foot x-ray, lab draws, blood cultures.  Patient will need debridement later today.  Patient to be admitted to hospital per hospitalist for medical management; general surgery will follow that has consult for debridement/wound care              [1]   Current Outpatient Medications   Medication Sig Dispense Refill    amLODIPine (NORVASC) 5 MG tablet Take 1 tablet (5 mg total) by mouth once daily. 30 tablet 0    atorvastatin (LIPITOR) 40 MG tablet Take 1 tablet (40 mg total) by mouth once daily. 30 tablet 0    doxycycline (VIBRA-TABS) 100 MG " tablet Take 1 tablet (100 mg total) by mouth every 12 (twelve) hours. for 14 days 28 tablet 0    insulin NPH-insulin regular, 70/30, (HUMULIN 70/30 U-100 INSULIN) 100 unit/mL (70-30) injection Inject 10 Units into the skin 2 (two) times a day. 10 mL 11    insulin syringe-needle U-100 0.3 mL 29 gauge Syrg 1 Needle by Misc.(Non-Drug; Combo Route) route 2 (two) times a day. 100 each 11    losartan-hydrochlorothiazide 50-12.5 mg (HYZAAR) 50-12.5 mg per tablet Take 1 tablet by mouth once daily. 30 tablet 0    metFORMIN (GLUCOPHAGE) 500 MG tablet Take 1 tablet (500 mg total) by mouth 2 (two) times daily with meals. 60 tablet 0    alcohol swabs (ALCOHOL PADS) PadM Apply 1 each topically as needed. (Patient not taking: Reported on 2025) 200 each 11     No current facility-administered medications for this visit.   [2]   Social History  Tobacco Use    Smoking status: Every Day     Current packs/day: 0.00     Types: Vaping with nicotine, Cigarettes     Last attempt to quit: 2019     Years since quittin.5    Smokeless tobacco: Never   Substance Use Topics    Alcohol use: Yes     Comment: 6 pack a day    Drug use: Never

## 2025-07-29 NOTE — ED PROVIDER NOTES
Encounter Date: 7/29/2025    SCRIBE #1 NOTE: I, Ethel Johnson, am scribing for, and in the presence of,  Ferdinand Drummond MD. I have scribed the entire note.       History     Chief Complaint   Patient presents with    Wound Check     Patient presents to the ED from Dr. Major's office with c/o wound to right foot. Patient states Dr. Major wants patient admitted for surgery to removed his fourth digit on his right foot due to necrosis.        This is a 50 y/o male,who presents to the ED for further evaluation. He was transported from Dr. Major's office for further evaluation of a right foot wound. Pt states the he was seen initially for a wound to the bottom of the right foot. He states he is diabetic. He takes insulin daily but does not have a way to check his blood sugar. He reports Dr. Major plans to have him admitted for surgery to remove the fourth digit on his right foot due to necrosis. There is no hx of a fever or chills. There are no other complaints/pain in the ED at this time. He has a known hx of HTN and Hyperlipidemia. He quit smoking 8 years ago but vapes currently. He has had a Debridement of foot (Right).     The history is provided by the patient and medical records. No  was used.     Review of patient's allergies indicates:  No Known Allergies  Past Medical History:   Diagnosis Date    Diabetes mellitus, type 2     Hyperlipidemia     Hypertension      Past Surgical History:   Procedure Laterality Date    DEBRIDEMENT OF FOOT Right 7/13/2025    Procedure: DEBRIDEMENT, FOOT;  Surgeon: Moises Varela DO;  Location: [facility];  Service: General;  Laterality: Right;     Family History   Problem Relation Name Age of Onset    Heart disease Mother      Diabetes Father      Hypertension Father       Social History[1]  Review of Systems   Constitutional:  Negative for chills and fever.   Skin:  Positive for wound.   All other systems reviewed and are  negative.      Physical Exam     Initial Vitals [07/29/25 0951]   BP Pulse Resp Temp SpO2   (!) 137/90 95 17 98.5 °F (36.9 °C) 97 %      MAP       --         Physical Exam    Nursing note and vitals reviewed.  Constitutional: He appears well-developed and well-nourished.   HENT:   Head: Normocephalic and atraumatic.   Eyes: Conjunctivae and EOM are normal. Pupils are equal, round, and reactive to light.   Neck: Neck supple.   Normal range of motion.  Cardiovascular:  Normal rate, regular rhythm, normal heart sounds and intact distal pulses.           Pulmonary/Chest: Breath sounds normal.   Abdominal: Abdomen is soft. Bowel sounds are normal.   Musculoskeletal:         General: Normal range of motion.      Cervical back: Normal range of motion and neck supple.      Comments: Atropic toenails to the right foot.    Right foot swollen and red  Large oval ulcer with drain to the bottom of the right foot  3rd and 4th toes to the right foot  are red swollen and macerated       Neurological: He is alert and oriented to person, place, and time. He has normal strength.   Skin: Skin is warm and dry. Capillary refill takes less than 2 seconds.   Psychiatric: He has a normal mood and affect. Thought content normal.         ED Course   Procedures  Labs Reviewed   COMPREHENSIVE METABOLIC PANEL - Abnormal       Result Value    Sodium 134 (*)     Potassium 3.9      Chloride 99      CO2 26      Anion Gap 13      Glucose 404 (*)     BUN 9      Creatinine 0.78      BUN/Creatinine Ratio 12      Calcium 9.4      Total Protein 8.2      Albumin 2.8 (*)     Globulin 5.4 (*)     A/G Ratio 0.5      Bilirubin, Total 0.4      Alk Phos 118      ALT 9      AST 19      eGFR 111     HIGH SENSITIVITY CRP - Abnormal    CRP, High Senstivity 83.68 (*)    CBC WITH DIFFERENTIAL - Abnormal    WBC 9.99      RBC 4.65      Hemoglobin 14.3      Hematocrit 42.8      MCV 92.0      MCH 30.8      MCHC 33.4      RDW 12.0      Platelet Count 352      MPV 10.3       Neutrophils % 69.9 (*)     Lymphocytes % 18.7 (*)     Monocytes % 9.5 (*)     Eosinophils % 1.1      Basophils % 0.5      Immature Granulocytes % 0.3      nRBC, Auto 0.0      Neutrophils, Abs 6.98      Lymphocytes, Absolute 1.87      Monocytes, Absolute 0.95 (*)     Eosinophils, Absolute 0.11      Basophils, Absolute 0.05      Immature Granulocytes, Absolute 0.03      nRBC, Absolute 0.00      Diff Type Auto     URINALYSIS, REFLEX TO URINE CULTURE - Abnormal    Color, UA Colorless      Clarity, UA Clear      pH, UA 5.5      Leukocytes, UA Negative      Nitrites, UA Negative      Protein, UA 10 (*)     Glucose, UA >1000 (*)     Ketones, UA Trace      Urobilinogen, UA Normal      Bilirubin, UA Negative      Blood, UA Negative      Specific Gravity, UA 1.045 (*)    POCT GLUCOSE MONITORING CONTINUOUS - Abnormal    POC Glucose 464 (*)    PROTIME-INR - Normal    PT 13.6      INR 1.03     APTT - Normal    PTT 25.9     CULTURE, BLOOD   CULTURE, BLOOD   CBC W/ AUTO DIFFERENTIAL    Narrative:     The following orders were created for panel order CBC auto differential.  Procedure                               Abnormality         Status                     ---------                               -----------         ------                     CBC with Differential[[id number]]       Abnormal            Final result                 Please view results for these tests on the individual orders.   ACETONE    Acetone Negative     POCT GLUCOSE, HAND-HELD DEVICE          Imaging Results              X-Ray Foot Complete Right (Final result)  Result time 07/29/25 12:00:18      Final result by Michael Limon MD (07/29/25 12:00:18)                   Impression:      Soft tissue edema.  No acute displaced fracture.  Osteomyelitis in the 4th proximal phalanx is difficult to exclude.    Additional findings discussed in the body of the report.      Electronically signed by: Michael Limon MD  Date:    07/29/2025  Time:    12:00                Narrative:    EXAMINATION:  XR ANKLE COMPLETE 3 VIEW RIGHT; XR FOOT COMPLETE 3 VIEW RIGHT    CLINICAL HISTORY:  Type 2 diabetes mellitus with other skin complications    TECHNIQUE:  AP, lateral, and oblique images of the right ankle were performed.  Three views of the right foot also obtained.    COMPARISON:  07/12/2025 and 05/03/2024.    FINDINGS:  Right ankle: No acute displaced fracture.  No dislocation.  Ankle soft tissue edema, slightly more pronounced over the lateral malleolus.  No unexpected radiopaque foreign body.  Chronic appearing calcifications along the anterior aspect of the calf soft tissues.    Right foot: No acute displaced fracture.  No dislocation.  Soft tissue edema.  Questionable ulceration adjacent to the 1st toe.  Linear density potentially representing a drain overlies the forefoot.  Mild subcutaneous emphysema in this region.  Suggest correlation with clinical findings and physical exam.  Questionable peripheral lucencies involving the 4th proximal phalanx which could be obscured or over exaggerated by overlapping drain in this region.  Osteomyelitis or surgical change not excluded.  No large focal area of bony erosion though MRI would provide improved sensitivity if there is concern for osteomyelitis.                                       X-Ray Ankle Complete Right (Final result)  Result time 07/29/25 12:00:18      Final result by Michael Limon MD (07/29/25 12:00:18)                   Impression:      Soft tissue edema.  No acute displaced fracture.  Osteomyelitis in the 4th proximal phalanx is difficult to exclude.    Additional findings discussed in the body of the report.      Electronically signed by: Michael Limon MD  Date:    07/29/2025  Time:    12:00               Narrative:    EXAMINATION:  XR ANKLE COMPLETE 3 VIEW RIGHT; XR FOOT COMPLETE 3 VIEW RIGHT    CLINICAL HISTORY:  Type 2 diabetes mellitus with other skin complications    TECHNIQUE:  AP, lateral, and oblique  images of the right ankle were performed.  Three views of the right foot also obtained.    COMPARISON:  07/12/2025 and 05/03/2024.    FINDINGS:  Right ankle: No acute displaced fracture.  No dislocation.  Ankle soft tissue edema, slightly more pronounced over the lateral malleolus.  No unexpected radiopaque foreign body.  Chronic appearing calcifications along the anterior aspect of the calf soft tissues.    Right foot: No acute displaced fracture.  No dislocation.  Soft tissue edema.  Questionable ulceration adjacent to the 1st toe.  Linear density potentially representing a drain overlies the forefoot.  Mild subcutaneous emphysema in this region.  Suggest correlation with clinical findings and physical exam.  Questionable peripheral lucencies involving the 4th proximal phalanx which could be obscured or over exaggerated by overlapping drain in this region.  Osteomyelitis or surgical change not excluded.  No large focal area of bony erosion though MRI would provide improved sensitivity if there is concern for osteomyelitis.                                       Medications   amLODIPine tablet 5 mg (5 mg Oral Given 7/29/25 1150)   atorvastatin tablet 40 mg (40 mg Oral Given 7/29/25 1150)   insulin NPH-insulin regular (70/30) injection 10 Units (10 Units Subcutaneous Given 7/29/25 2133)   sodium chloride 0.9% flush 10 mL (has no administration in time range)   naloxone 0.4 mg/mL injection 0.02 mg (has no administration in time range)   glucose chewable tablet 16 g (has no administration in time range)   glucose chewable tablet 24 g (has no administration in time range)   dextrose 50% injection 12.5 g (has no administration in time range)   dextrose 50% injection 25 g (has no administration in time range)   glucagon (human recombinant) injection 1 mg (has no administration in time range)   enoxaparin injection 40 mg (has no administration in time range)   oxyCODONE immediate release tablet 5 mg (5 mg Oral Given  7/29/25 2128)   morphine injection 4 mg (has no administration in time range)   polyethylene glycol packet 17 g (has no administration in time range)   ondansetron injection 4 mg (has no administration in time range)   insulin aspart U-100 injection 0-5 Units (1 Units Subcutaneous Given 7/29/25 2216)   acetaminophen tablet 650 mg (has no administration in time range)   vancomycin - pharmacy to dose (has no administration in time range)   piperacillin-tazobactam (ZOSYN) 4.5 g in D5W 100 mL IVPB (MB+) (0 g Intravenous Stopped 7/29/25 2237)   0.9% NaCl infusion ( Intravenous Restarted 7/29/25 2132)   mupirocin 2 % ointment ( Nasal Given 7/29/25 2129)   losartan tablet 50 mg (50 mg Oral Given 7/29/25 1150)   hydroCHLOROthiazide tablet 12.5 mg (12.5 mg Oral Given 7/29/25 1150)   vancomycin (VANCOCIN) 2,000 mg in 0.9% NaCl 500 mL IVPB (2,000 mg Intravenous New Bag 7/29/25 1325)   piperacillin-tazobactam (ZOSYN) 4.5 g in D5W 100 mL IVPB (MB+) (0 g Intravenous Stopped 7/29/25 1234)     Medical Decision Making  This is a 48 y/o male,who presents to the ED for further evaluation. He was transported from Dr. Major's office for further evaluation of a right foot wound. Pt states the he was seen initially for a wound to the bottom of the right foot. He states he is diabetic. He takes insulin daily but does not have a way to check his blood sugar. He reports Dr. Major plans to have him admitted for surgery to remove the fourth digit on his right foot due to necrosis. There is no hx of a fever or chills. There are no other complaints/pain in the ED at this time. He has a known hx of HTN and Hyperlipidemia. He quit smoking 8 years ago but vapes currently. He has had a Debridement of foot (Right).   DDX:  DIABETIC FOOT INFECTION..CELLULITIS +/- OSTEOMYELITIS WITH UNDERLYING PVD.  ADMIT      Amount and/or Complexity of Data Reviewed  Independent Historian:      Details: We spoke with the pt.     Labs: ordered. Decision-making  details documented in ED Course.  Radiology: ordered. Decision-making details documented in ED Course.  Discussion of management or test interpretation with external provider(s): DISCUSSED WITH ADMITTING SERVICE.    Risk  Decision regarding hospitalization.              Attending Attestation:           Physician Attestation for Scribe:  Physician Attestation Statement for Scribe #1: I, Ferdinand Drummond MD, reviewed documentation, as scribed by Ethel Johnson in my presence, and it is both accurate and complete.             ED Course as of 07/30/25 0029   Tue Jul 29, 2025   1217 X-ray Foot Complete Right:   Narrative & Impression  EXAMINATION:  XR ANKLE COMPLETE 3 VIEW RIGHT; XR FOOT COMPLETE 3 VIEW RIGHT     CLINICAL HISTORY:  Type 2 diabetes mellitus with other skin complications     TECHNIQUE:  AP, lateral, and oblique images of the right ankle were performed.  Three views of the right foot also obtained.     COMPARISON:  07/12/2025 and 05/03/2024.     FINDINGS:  Right ankle: No acute displaced fracture.  No dislocation.  Ankle soft tissue edema, slightly more pronounced over the lateral malleolus.  No unexpected radiopaque foreign body.  Chronic appearing calcifications along the anterior aspect of the calf soft tissues.     Right foot: No acute displaced fracture.  No dislocation.  Soft tissue edema.  Questionable ulceration adjacent to the 1st toe.  Linear density potentially representing a drain overlies the forefoot.  Mild subcutaneous emphysema in this region.  Suggest correlation with clinical findings and physical exam.  Questionable peripheral lucencies involving the 4th proximal phalanx which could be obscured or over exaggerated by overlapping drain in this region.  Osteomyelitis or surgical change not excluded.  No large focal area of bony erosion though MRI would provide improved sensitivity if there is concern for osteomyelitis.     Impression:     Soft tissue edema.  No acute displaced fracture.   Osteomyelitis in the 4th proximal phalanx is difficult to exclude.   [BW]      ED Course User Index  [BW] Shital Johnsontany INOCENCIO                               Clinical Impression:  Final diagnoses:  [E11.628, L08.9] Diabetic foot infection          ED Disposition Condition    Admit                       [1]   Social History  Tobacco Use    Smoking status: Every Day     Current packs/day: 0.00     Types: Vaping with nicotine, Cigarettes     Last attempt to quit: 2019     Years since quittin.5    Smokeless tobacco: Never   Vaping Use    Vaping status: Unknown   Substance Use Topics    Alcohol use: Yes     Comment: 6 pack a day    Drug use: Never        eFrdinand Drummond MD  25 0029

## 2025-07-29 NOTE — ASSESSMENT & PLAN NOTE
Patient's blood pressure range in the last 24 hours was: BP  Min: 137/90  Max: 137/90.The patient's inpatient anti-hypertensive regimen is listed below:  Current Antihypertensives  amLODIPine tablet 5 mg, Daily, Oral  losartan-hydrochlorothiazide 50-12.5 mg per tablet 1 tablet, Daily, Oral    Plan  - BP is controlled, no changes needed to their regimen  - follow

## 2025-07-29 NOTE — PROGRESS NOTES
"Pharmacokinetic Initial Assessment: IV Vancomycin    Assessment/Plan:    Initiate intravenous vancomycin as 2000 mg IV q18h  Desired empiric serum trough concentration is 15 to 20 mcg/mL  Draw vancomycin trough level 30 min prior to fourth dose on 7/31 at approximately 1930  Pharmacy will continue to follow and monitor vancomycin.      Please contact pharmacy at extension [phone] with any questions regarding this assessment.     Patient brief summary:  Wallace Pedro is a 49 y.o. male initiated on antimicrobial therapy with IV Vancomycin for treatment of suspected bacteremia    Drug Allergies:   Review of patient's allergies indicates:  No Known Allergies    Actual Body Weight:   102.5 kg    Renal Function:   Estimated Creatinine Clearance: 138.1 mL/min (based on SCr of 0.78 mg/dL).,     Dialysis Method (if applicable):  N/A    CBC (last 72 hours):  Recent Labs   Lab Result Units 07/29/25  1046   WBC K/uL 9.99   Hemoglobin g/dL 14.3   Hematocrit % 42.8   Platelet Count K/uL 352   Lymphocytes % % 18.7*   Monocytes % % 9.5*   Eosinophils % % 1.1   Basophils % % 0.5   Diff Type  Auto       Metabolic Panel (last 72 hours):  Recent Labs   Lab Result Units 07/29/25  1046   Sodium mmol/L 134*   Potassium mmol/L 3.9   Chloride mmol/L 99   CO2 mmol/L 26   Glucose mg/dL 404*   BUN mg/dL 9   Creatinine mg/dL 0.78   Albumin g/dL 2.8*   Bilirubin, Total mg/dL 0.4   Alk Phos U/L 118   AST U/L 19   ALT U/L 9       Drug levels (last 3 results):  No results for input(s): "VANCOMYCINRA", "VANCORANDOM", "VANCOMYCINPE", "VANCOPEAK", "VANCOMYCINTR", "VANCOTROUGH" in the last 72 hours.    Microbiologic Results:  Microbiology Results (last 7 days)       Procedure Component Value Units Date/Time    Blood culture #1 [[id number]] Collected: 07/29/25 1132    Order Status: Sent Specimen: Blood from Peripheral, Upper Arm, Right Updated: 07/29/25 1145    Blood culture #2 [[id number]] Collected: 07/29/25 1138    Order Status: Sent Specimen: Blood from " Wrist, Right Updated: 07/29/25 1149

## 2025-07-29 NOTE — ASSESSMENT & PLAN NOTE
"Patient's FSGs are uncontrolled due to hyperglycemia on current medication regimen.  Last A1c reviewed-   Lab Results   Component Value Date    HGBA1C 11.6 (H) 07/02/2025     Most recent fingerstick glucose reviewed- No results for input(s): "POCTGLUCOSE" in the last 24 hours.  Current correctional scale  Low  Maintain anti-hyperglycemic dose as follows-   Antihyperglycemics (From admission, onward)      Start     Stop Route Frequency Ordered    07/29/25 1148  insulin aspart U-100 injection 0-5 Units         -- SubQ Before meals & nightly PRN 07/29/25 1050    07/29/25 1100  insulin NPH-insulin regular (70/30) injection 10 Units         -- SubQ 2 times daily 07/29/25 1050          Hold Oral hypoglycemics while patient is in the hospital.  "

## 2025-07-30 PROBLEM — F17.200 TOBACCO DEPENDENCY: Status: ACTIVE | Noted: 2025-07-30

## 2025-07-30 LAB
ANION GAP SERPL CALCULATED.3IONS-SCNC: 13 MMOL/L (ref 7–16)
BASOPHILS # BLD AUTO: 0.06 K/UL (ref 0–0.2)
BASOPHILS NFR BLD AUTO: 0.7 % (ref 0–1)
BUN SERPL-MCNC: 9 MG/DL (ref 9–21)
BUN/CREAT SERPL: 10 (ref 6–20)
CALCIUM SERPL-MCNC: 8.3 MG/DL (ref 8.4–10.2)
CHLORIDE SERPL-SCNC: 102 MMOL/L (ref 98–107)
CO2 SERPL-SCNC: 25 MMOL/L (ref 22–29)
CREAT SERPL-MCNC: 0.93 MG/DL (ref 0.72–1.25)
DIFFERENTIAL METHOD BLD: ABNORMAL
EGFR (NO RACE VARIABLE) (RUSH/TITUS): 102 ML/MIN/1.73M2
EOSINOPHIL # BLD AUTO: 0.12 K/UL (ref 0–0.5)
EOSINOPHIL NFR BLD AUTO: 1.4 % (ref 1–4)
ERYTHROCYTE [DISTWIDTH] IN BLOOD BY AUTOMATED COUNT: 11.9 % (ref 11.5–14.5)
GLUCOSE SERPL-MCNC: 226 MG/DL (ref 74–100)
GLUCOSE SERPL-MCNC: 268 MG/DL (ref 70–105)
GLUCOSE SERPL-MCNC: 294 MG/DL (ref 70–105)
GLUCOSE SERPL-MCNC: 373 MG/DL (ref 70–105)
HCT VFR BLD AUTO: 36.9 % (ref 40–54)
HGB BLD-MCNC: 11.9 G/DL (ref 13.5–18)
IMM GRANULOCYTES # BLD AUTO: 0.03 K/UL (ref 0–0.04)
IMM GRANULOCYTES NFR BLD: 0.4 % (ref 0–0.4)
LYMPHOCYTES # BLD AUTO: 1.18 K/UL (ref 1–4.8)
LYMPHOCYTES NFR BLD AUTO: 13.9 % (ref 27–41)
MAGNESIUM SERPL-MCNC: 1.5 MG/DL (ref 1.6–2.6)
MCH RBC QN AUTO: 30.4 PG (ref 27–31)
MCHC RBC AUTO-ENTMCNC: 32.2 G/DL (ref 32–36)
MCV RBC AUTO: 94.4 FL (ref 80–96)
MONOCYTES # BLD AUTO: 0.89 K/UL (ref 0–0.8)
MONOCYTES NFR BLD AUTO: 10.5 % (ref 2–6)
MPC BLD CALC-MCNC: 10 FL (ref 9.4–12.4)
NEUTROPHILS # BLD AUTO: 6.23 K/UL (ref 1.8–7.7)
NEUTROPHILS NFR BLD AUTO: 73.1 % (ref 53–65)
NRBC # BLD AUTO: 0 X10E3/UL
NRBC, AUTO (.00): 0 %
PHOSPHATE SERPL-MCNC: 3.7 MG/DL (ref 2.3–4.7)
PLATELET # BLD AUTO: 266 K/UL (ref 150–400)
POTASSIUM SERPL-SCNC: 3.7 MMOL/L (ref 3.5–5.1)
RBC # BLD AUTO: 3.91 M/UL (ref 4.6–6.2)
SODIUM SERPL-SCNC: 136 MMOL/L (ref 136–145)
WBC # BLD AUTO: 8.51 K/UL (ref 4.5–11)

## 2025-07-30 PROCEDURE — 27000207 HC ISOLATION

## 2025-07-30 PROCEDURE — 25000003 PHARM REV CODE 250: Performed by: STUDENT IN AN ORGANIZED HEALTH CARE EDUCATION/TRAINING PROGRAM

## 2025-07-30 PROCEDURE — 82962 GLUCOSE BLOOD TEST: CPT

## 2025-07-30 PROCEDURE — 85025 COMPLETE CBC W/AUTO DIFF WBC: CPT | Performed by: STUDENT IN AN ORGANIZED HEALTH CARE EDUCATION/TRAINING PROGRAM

## 2025-07-30 PROCEDURE — 63600175 PHARM REV CODE 636 W HCPCS: Performed by: STUDENT IN AN ORGANIZED HEALTH CARE EDUCATION/TRAINING PROGRAM

## 2025-07-30 PROCEDURE — 94761 N-INVAS EAR/PLS OXIMETRY MLT: CPT

## 2025-07-30 PROCEDURE — 80048 BASIC METABOLIC PNL TOTAL CA: CPT | Performed by: STUDENT IN AN ORGANIZED HEALTH CARE EDUCATION/TRAINING PROGRAM

## 2025-07-30 PROCEDURE — 84100 ASSAY OF PHOSPHORUS: CPT | Performed by: STUDENT IN AN ORGANIZED HEALTH CARE EDUCATION/TRAINING PROGRAM

## 2025-07-30 PROCEDURE — 99233 SBSQ HOSP IP/OBS HIGH 50: CPT | Mod: ,,, | Performed by: STUDENT IN AN ORGANIZED HEALTH CARE EDUCATION/TRAINING PROGRAM

## 2025-07-30 PROCEDURE — 11000001 HC ACUTE MED/SURG PRIVATE ROOM

## 2025-07-30 PROCEDURE — 83735 ASSAY OF MAGNESIUM: CPT | Performed by: STUDENT IN AN ORGANIZED HEALTH CARE EDUCATION/TRAINING PROGRAM

## 2025-07-30 PROCEDURE — 63600175 PHARM REV CODE 636 W HCPCS

## 2025-07-30 PROCEDURE — 36415 COLL VENOUS BLD VENIPUNCTURE: CPT | Performed by: STUDENT IN AN ORGANIZED HEALTH CARE EDUCATION/TRAINING PROGRAM

## 2025-07-30 RX ADMIN — ATORVASTATIN CALCIUM 40 MG: 40 TABLET, FILM COATED ORAL at 08:07

## 2025-07-30 RX ADMIN — PIPERACILLIN SODIUM AND TAZOBACTAM SODIUM 4.5 G: 4; .5 INJECTION, POWDER, FOR SOLUTION INTRAVENOUS at 12:07

## 2025-07-30 RX ADMIN — MUPIROCIN: 20 OINTMENT TOPICAL at 08:07

## 2025-07-30 RX ADMIN — INSULIN HUMAN 15 UNITS: 100 INJECTION, SUSPENSION SUBCUTANEOUS at 08:07

## 2025-07-30 RX ADMIN — PIPERACILLIN SODIUM AND TAZOBACTAM SODIUM 4.5 G: 4; .5 INJECTION, POWDER, FOR SOLUTION INTRAVENOUS at 04:07

## 2025-07-30 RX ADMIN — AMLODIPINE BESYLATE 5 MG: 5 TABLET ORAL at 08:07

## 2025-07-30 RX ADMIN — OXYCODONE HYDROCHLORIDE 5 MG: 5 TABLET ORAL at 06:07

## 2025-07-30 RX ADMIN — SODIUM CHLORIDE 2000 MG: 9 INJECTION, SOLUTION INTRAVENOUS at 12:07

## 2025-07-30 RX ADMIN — PIPERACILLIN SODIUM AND TAZOBACTAM SODIUM 4.5 G: 4; .5 INJECTION, POWDER, FOR SOLUTION INTRAVENOUS at 08:07

## 2025-07-30 RX ADMIN — LOSARTAN POTASSIUM 50 MG: 50 TABLET, FILM COATED ORAL at 08:07

## 2025-07-30 RX ADMIN — MORPHINE SULFATE 4 MG: 4 INJECTION, SOLUTION INTRAMUSCULAR; INTRAVENOUS at 06:07

## 2025-07-30 RX ADMIN — SODIUM CHLORIDE: 9 INJECTION, SOLUTION INTRAVENOUS at 04:07

## 2025-07-30 RX ADMIN — ENOXAPARIN SODIUM 40 MG: 40 INJECTION SUBCUTANEOUS at 06:07

## 2025-07-30 RX ADMIN — HYDROCHLOROTHIAZIDE 12.5 MG: 12.5 TABLET ORAL at 08:07

## 2025-07-30 NOTE — ASSESSMENT & PLAN NOTE
Patient's blood pressure range in the last 24 hours was: BP  Min: 96/60  Max: 136/89.The patient's inpatient anti-hypertensive regimen is listed below:  Current Antihypertensives  amLODIPine tablet 5 mg, Daily, Oral  losartan tablet 50 mg, Daily, Oral  hydroCHLOROthiazide tablet 12.5 mg, Daily, Oral    Plan  - BP is controlled, no changes needed to their regimen  - follow

## 2025-07-30 NOTE — PROGRESS NOTES
[facility]  General Surgery  Progress Note    Subjective:     Interval History:  Patient is awake and alert on exam this morning.  No acute changes overnight.  Postop dressing noted to right lower extremity/foot being clean dry and in place.  Plan for postop day 2 wound/dressing change.    Post-Op Info:  Procedure(s) (LRB):  DEBRIDEMENT, FOOT (Right)  AMPUTATION, 4th toe (Right)   1 Day Post-Op      Medications:  Continuous Infusions:  Scheduled Meds:   amLODIPine  5 mg Oral Daily    atorvastatin  40 mg Oral Daily    enoxparin  40 mg Subcutaneous Daily    hydroCHLOROthiazide  12.5 mg Oral Daily    insulin NPH-insulin regular (70/30)  15 Units Subcutaneous BID    losartan  50 mg Oral Daily    mupirocin   Nasal BID    piperacillin-tazobactam (Zosyn) IV (PEDS and ADULTS) (extended infusion is not appropriate)  4.5 g Intravenous Q8H    vancomycin (VANCOCIN) IV (PEDS and ADULTS)  2,000 mg Intravenous Q18H     PRN Meds:  Current Facility-Administered Medications:     acetaminophen, 650 mg, Oral, Q8H PRN    dextrose 50%, 12.5 g, Intravenous, PRN    dextrose 50%, 25 g, Intravenous, PRN    glucagon (human recombinant), 1 mg, Intramuscular, PRN    glucose, 16 g, Oral, PRN    glucose, 24 g, Oral, PRN    insulin aspart U-100, 0-5 Units, Subcutaneous, QID (AC + HS) PRN    morphine, 4 mg, Intravenous, Q4H PRN    naloxone, 0.02 mg, Intravenous, PRN    ondansetron, 4 mg, Intravenous, Q8H PRN    oxyCODONE, 5 mg, Oral, Q6H PRN    polyethylene glycol, 17 g, Oral, TID PRN    sodium chloride 0.9%, 10 mL, Intravenous, Q12H PRN    Pharmacy to dose Vancomycin consult, , , Once **AND** vancomycin - pharmacy to dose, , Intravenous, pharmacy to manage frequency     Objective:     Vital Signs (Most Recent):  Temp: 98.1 °F (36.7 °C) (07/30/25 1214)  Pulse: 82 (07/30/25 1214)  Resp: 18 (07/30/25 1214)  BP: 132/81 (07/30/25 1214)  SpO2: (!) 94 % (07/30/25 1214) Vital Signs (24h Range):  Temp:  [97.8 °F (36.6 °C)-100.7 °F  (38.2 °C)] 98.1 °F (36.7 °C)  Pulse:  [] 82  Resp:  [10-19] 18  SpO2:  [91 %-99 %] 94 %  BP: ()/(50-91) 132/81       Intake/Output Summary (Last 24 hours) at 7/30/2025 1415  Last data filed at 7/30/2025 0412  Gross per 24 hour   Intake 1420.12 ml   Output --   Net 1420.12 ml       Physical Exam  Vitals and nursing note reviewed.   Constitutional:       Appearance: He is ill-appearing.   HENT:      Head: Normocephalic.      Nose: Nose normal.      Mouth/Throat:      Mouth: Mucous membranes are moist.   Eyes:      Extraocular Movements: Extraocular movements intact.   Cardiovascular:      Rate and Rhythm: Normal rate.   Pulmonary:      Effort: Pulmonary effort is normal.   Abdominal:      Palpations: Abdomen is soft.   Musculoskeletal:         General: Normal range of motion.      Cervical back: Normal range of motion.   Skin:     General: Skin is warm and dry.   Neurological:      Mental Status: He is alert and oriented to person, place, and time.   Psychiatric:         Mood and Affect: Mood normal.         Significant Labs:  Recent Lab Results  (Last 5 results in the past 24 hours)        07/30/25  1214   07/30/25  0326   07/29/25  2131   07/29/25  1630   07/29/25  1542        Anion Gap   13             Baso #   0.06             Basophil %   0.7             BUN   9             BUN/CREAT RATIO   10             Calcium   8.3             Chloride   102             CO2   25             Creatinine   0.93             Culture, Bone         Culture In Progress  [P]       Culture, Wound/Abscess               Differential Method   Auto             eGFR   102  Comment: Estimated GFR calculated using the CKD-EPI creatinine (2021) equation.             Eos #   0.12             Eos %   1.4             Glucose   226             Hematocrit   36.9             Hemoglobin   11.9             Immature Grans (Abs)   0.03             Immature Granulocytes   0.4             Lymph #   1.18             Lymph %   13.9              Magnesium    1.5             MCH   30.4             MCHC   32.2             MCV   94.4             Mono #   0.89             Mono %   10.5             MPV   10.0             Neutrophils, Abs   6.23             Neutrophils Relative   73.1             nRBC   0.0             NUCLEATED RBC ABSOLUTE   0.00             Phosphorus Level   3.7             Platelet Count   266             POC Glucose 268     250   271         Potassium   3.7             RBC   3.91             RDW   11.9             Sodium   136             WBC   8.51                                     [P] - Preliminary Result             All pertinent labs from the last 24 hours have been reviewed.    Significant Diagnostics:  I have reviewed all pertinent imaging results/findings within the past 24 hours.    Assessment/Plan:     Active Diagnoses:    Diagnosis Date Noted POA    PRINCIPAL PROBLEM:  Diabetic foot ulcer [E11.621, L97.509] 07/15/2025 Yes     Chronic    Tobacco dependency [F17.200] 07/30/2025 Yes    Obesity (BMI 30-39.9) [E66.9] 07/29/2025 Yes    DM type 2 with diabetic dyslipidemia [E11.69, E78.5] 07/29/2025 Yes    Diabetic foot infection [E11.628, L08.9] 07/29/2025 Yes    Diabetic peripheral vascular disease [E11.51] 07/15/2025 Yes    Diabetic nephropathy [E11.21] 07/15/2025 Yes    Foot ulcer with necrosis of muscle, right [L97.513] 07/13/2025 Yes    Cellulitis of multiple sites of lower extremity [L03.119] 05/04/2024 Yes    Type 2 diabetes mellitus with hyperglycemia, without long-term current use of insulin [E11.65] 05/04/2024 Yes    Other hyperlipidemia [E78.49] 05/04/2024 Yes    Essential hypertension [I10] 12/28/2022 Yes      Problems Resolved During this Admission:         Feli Sandoval, MAHESHP  General Surgery  [facility]

## 2025-07-30 NOTE — ASSESSMENT & PLAN NOTE
"Patient's FSGs are uncontrolled due to hyperglycemia on current medication regimen.  Last A1c reviewed-   Lab Results   Component Value Date    HGBA1C 11.6 (H) 07/02/2025     Most recent fingerstick glucose reviewed- No results for input(s): "POCTGLUCOSE" in the last 24 hours.  Current correctional scale  Medium  Maintain anti-hyperglycemic dose as follows-   Antihyperglycemics (From admission, onward)      Start     Stop Route Frequency Ordered    07/30/25 0900  insulin NPH-insulin regular (70/30) injection 15 Units         -- SubQ 2 times daily 07/30/25 0822    07/29/25 1148  insulin aspart U-100 injection 0-5 Units         -- SubQ Before meals & nightly PRN 07/29/25 1050          Hold Oral hypoglycemics while patient is in the hospital.  "

## 2025-07-30 NOTE — HOSPITAL COURSE
7/30 awaiting cultures.  Increase insulin  7/31 increase insulin.  Patient has Enterococcus Enterobacter E coli and GBS grown in his wound.  He has Enterococcus GBS and preliminary DNR is in the bone we would assume this is E coli given wound cultures.  All should be covered with Zosyn.  8/1 continue Zosyn for now  8/2 we have a antibiotics regimen in place.  Needs a PICC line.  This will not be done until Monday.  Discharge thereafter  8/3 PICC tomorrow home with cipro and dapto  8/4 PICC line placed awaiting approval for antibiotics  8/4 antibiotics secured.  Stable for discharge.  Follow up PCP.  Follow up General surgery

## 2025-07-30 NOTE — PROGRESS NOTES
[facility] Hospital Medicine  Progress Note    Patient Name: Wallace Pedro  MRN: [id number]  Patient Class: IP- Inpatient   Admission Date: 7/29/2025  Length of Stay: 1 days  Attending Physician: Chacorta Yip DO  Primary Care Provider: Jamilah, Primary Doctor        Subjective     Principal Problem:Diabetic foot ulcer        HPI:  49-year-old white male with a past medical history of insulin dependent diabetes, diabetic foot wound in the right foot, diabetic neuropathy, diabetic dyslipidemia hypertension obesity presents to the ED after evaluation in surgery clinic today.  Patient had a debridement of a right foot wound a few weeks ago.  Was kept in-house on IV antibiotics for several days and discharged home with doxycycline.  Patient has been taking his antibiotic.  His girlfriend has been helping him with wound care.  Over the last few days the wound has been to drain more in developed a worsening odor.  Presented to surgery Clinic today for follow up.  Sent to the ED for admission and debridement and IV antibiotics this afternoon.  Review of systems otherwise negative    Overview/Hospital Course:  7/30 awaiting cultures.  Increase insulin    Interval History:  No acute events overnight    Review of Systems  Objective:     Vital Signs (Most Recent):  Temp: 98.2 °F (36.8 °C) (07/30/25 0802)  Pulse: 83 (07/30/25 0802)  Resp: 18 (07/30/25 0802)  BP: 105/65 (07/30/25 0802)  SpO2: (!) 94 % (07/30/25 0802) Vital Signs (24h Range):  Temp:  [97.8 °F (36.6 °C)-100.7 °F (38.2 °C)] 98.2 °F (36.8 °C)  Pulse:  [75-99] 83  Resp:  [10-19] 18  SpO2:  [91 %-99 %] 94 %  BP: ()/(54-91) 105/65     Weight: 102.5 kg (226 lb)  Body mass index is 33.37 kg/m².    Intake/Output Summary (Last 24 hours) at 7/30/2025 1003  Last data filed at 7/30/2025 0412  Gross per 24 hour   Intake 1420.12 ml   Output --   Net 1420.12 ml         Physical Exam  Vitals reviewed.   Constitutional:       Appearance: Normal appearance.  "  HENT:      Head: Normocephalic and atraumatic.      Nose: Nose normal.   Eyes:      Conjunctiva/sclera: Conjunctivae normal.   Neck:      Trachea: Trachea normal.   Cardiovascular:      Rate and Rhythm: Normal rate and regular rhythm.      Pulses: Normal pulses.      Heart sounds: Normal heart sounds.   Pulmonary:      Effort: Pulmonary effort is normal.      Breath sounds: Normal breath sounds and air entry.   Abdominal:      Palpations: Abdomen is soft.   Musculoskeletal:      Comments: Wound right foot   Skin:     General: Skin is warm and dry.   Neurological:      General: No focal deficit present.      Mental Status: He is alert and oriented to person, place, and time.      Comments: Grossly normal motor and sensory function without focal deficit appreciated.   Psychiatric:         Mood and Affect: Mood and affect normal.         Behavior: Behavior is cooperative.               Significant Labs: All pertinent labs within the past 24 hours have been reviewed.    Significant Imaging: I have reviewed all pertinent imaging results/findings within the past 24 hours.      Assessment & Plan  Diabetic foot ulcer  Patient's FSGs are uncontrolled due to hyperglycemia on current medication regimen.  Last A1c reviewed-   Lab Results   Component Value Date    HGBA1C 11.6 (H) 07/02/2025     Most recent fingerstick glucose reviewed- No results for input(s): "POCTGLUCOSE" in the last 24 hours.  Current correctional scale  Low  Maintain anti-hyperglycemic dose as follows-   Antihyperglycemics (From admission, onward)      Start     Stop Route Frequency Ordered    07/30/25 0900  insulin NPH-insulin regular (70/30) injection 15 Units         -- SubQ 2 times daily 07/30/25 0822    07/29/25 1148  insulin aspart U-100 injection 0-5 Units         -- SubQ Before meals & nightly PRN 07/29/25 1050          Hold Oral hypoglycemics while patient is in the hospital.  7/30 Debridement yesterday  Continue vancomycin and Zosyn  Essential " "hypertension  Patient's blood pressure range in the last 24 hours was: BP  Min: 96/60  Max: 136/89.The patient's inpatient anti-hypertensive regimen is listed below:  Current Antihypertensives  amLODIPine tablet 5 mg, Daily, Oral  losartan tablet 50 mg, Daily, Oral  hydroCHLOROthiazide tablet 12.5 mg, Daily, Oral    Plan  - BP is controlled, no changes needed to their regimen  - follow  Cellulitis of multiple sites of lower extremity  IV antibiotics surgical debridement    Type 2 diabetes mellitus with hyperglycemia, without long-term current use of insulin  We will maintain home insulin regimen given patient is self-pay.  Try and optimize his 70 30 regimen  We will add sliding scale.  Increase 70 30  Other hyperlipidemia  Statin    Foot ulcer with necrosis of muscle, right  Debridement today    Diabetic peripheral vascular disease  Glycemic control statin    Diabetic nephropathy  Glycemic control continue Arb  Continue ARB  Obesity (BMI 30-39.9)  Body mass index is 33.37 kg/m². Morbid obesity complicates all aspects of disease management from diagnostic modalities to treatment. Weight loss encouraged and health benefits explained to patient.       DM type 2 with diabetic dyslipidemia  Patient's FSGs are uncontrolled due to hyperglycemia on current medication regimen.  Last A1c reviewed-   Lab Results   Component Value Date    HGBA1C 11.6 (H) 07/02/2025     Most recent fingerstick glucose reviewed- No results for input(s): "POCTGLUCOSE" in the last 24 hours.  Current correctional scale  Low  Maintain anti-hyperglycemic dose as follows-   Antihyperglycemics (From admission, onward)      Start     Stop Route Frequency Ordered    07/30/25 0900  insulin NPH-insulin regular (70/30) injection 15 Units         -- SubQ 2 times daily 07/30/25 0822    07/29/25 1148  insulin aspart U-100 injection 0-5 Units         -- SubQ Before meals & nightly PRN 07/29/25 1050          Hold Oral hypoglycemics while patient is in the " "hospital.  Diabetic foot infection  Patient's FSGs are uncontrolled due to hyperglycemia on current medication regimen.  Last A1c reviewed-   Lab Results   Component Value Date    HGBA1C 11.6 (H) 07/02/2025     Most recent fingerstick glucose reviewed- No results for input(s): "POCTGLUCOSE" in the last 24 hours.  Current correctional scale  Medium  Maintain anti-hyperglycemic dose as follows-   Antihyperglycemics (From admission, onward)      Start     Stop Route Frequency Ordered    07/30/25 0900  insulin NPH-insulin regular (70/30) injection 15 Units         -- SubQ 2 times daily 07/30/25 0822    07/29/25 1148  insulin aspart U-100 injection 0-5 Units         -- SubQ Before meals & nightly PRN 07/29/25 1050          Hold Oral hypoglycemics while patient is in the hospital.  Tobacco dependency  Dangers of cigarette smoking were reviewed with patient in detail. Patient was not counseled Nicotine replacement options were discussed. Nicotine replacement was discussed- not prescribed  VTE Risk Mitigation (From admission, onward)           Ordered     enoxaparin injection 40 mg  Daily         07/29/25 1050     IP VTE HIGH RISK PATIENT  Once         07/29/25 1050     Place sequential compression device  Until discontinued         07/29/25 1050                    Discharge Planning   NIDHI: 8/7/2025     Code Status: Full Code   Medical Readiness for Discharge Date:   Discharge Plan A: Home          Continue vancomycin, monitor for vancomycin toxicity              Chacorta Yip DO  Department of Hospital Medicine   [facility]    "

## 2025-07-30 NOTE — ASSESSMENT & PLAN NOTE
"Patient's FSGs are uncontrolled due to hyperglycemia on current medication regimen.  Last A1c reviewed-   Lab Results   Component Value Date    HGBA1C 11.6 (H) 07/02/2025     Most recent fingerstick glucose reviewed- No results for input(s): "POCTGLUCOSE" in the last 24 hours.  Current correctional scale  Low  Maintain anti-hyperglycemic dose as follows-   Antihyperglycemics (From admission, onward)      Start     Stop Route Frequency Ordered    07/30/25 0900  insulin NPH-insulin regular (70/30) injection 15 Units         -- SubQ 2 times daily 07/30/25 0822    07/29/25 1148  insulin aspart U-100 injection 0-5 Units         -- SubQ Before meals & nightly PRN 07/29/25 1050          Hold Oral hypoglycemics while patient is in the hospital.  7/30 Debridement yesterday  Continue vancomycin and Zosyn  "

## 2025-07-30 NOTE — PLAN OF CARE
Problem: Adult Inpatient Plan of Care  Goal: Plan of Care Review  Outcome: Progressing  Goal: Patient-Specific Goal (Individualized)  Outcome: Progressing  Goal: Absence of Hospital-Acquired Illness or Injury  Outcome: Progressing  Goal: Optimal Comfort and Wellbeing  Outcome: Progressing  Goal: Readiness for Transition of Care  Outcome: Progressing     Problem: Sepsis/Septic Shock  Goal: Optimal Coping  Outcome: Progressing  Goal: Absence of Bleeding  Outcome: Progressing  Goal: Blood Glucose Level Within Targeted Range  Outcome: Progressing  Goal: Absence of Infection Signs and Symptoms  Outcome: Progressing  Goal: Optimal Nutrition Intake  Outcome: Progressing     Problem: Diabetes Comorbidity  Goal: Blood Glucose Level Within Targeted Range  Outcome: Progressing     Problem: Wound  Goal: Optimal Coping  Outcome: Progressing  Goal: Optimal Functional Ability  Outcome: Progressing  Goal: Absence of Infection Signs and Symptoms  Outcome: Progressing  Goal: Improved Oral Intake  Outcome: Progressing  Goal: Optimal Pain Control and Function  Outcome: Progressing  Goal: Skin Health and Integrity  Outcome: Progressing  Goal: Optimal Wound Healing  Outcome: Progressing     Problem: Fall Injury Risk  Goal: Absence of Fall and Fall-Related Injury  Outcome: Progressing

## 2025-07-30 NOTE — PROGRESS NOTES
[facility]  Wound Care    Patient Name:  Wallace Pedro   MRN:  [id number]  Date: 2025  Diagnosis: Diabetic foot ulcer    History:     Past Medical History:   Diagnosis Date    Diabetes mellitus, type 2     Hyperlipidemia     Hypertension        Social History[1]    Precautions:     Allergies as of 2025    (No Known Allergies)       [facility] Assessment Details/Treatment     Narrative: Seen patient for initiation of preventative skin care measures    Patient in bed, Alert. Has dressing to Right foot. States skin is overall okay.   Sergey score 19    Consult wound care for any skin issuess        2025         [1]   Social History  Socioeconomic History    Marital status:    Tobacco Use    Smoking status: Every Day     Current packs/day: 0.00     Types: Vaping with nicotine, Cigarettes     Last attempt to quit: 2019     Years since quittin.5    Smokeless tobacco: Never   Vaping Use    Vaping status: Unknown   Substance and Sexual Activity    Alcohol use: Yes     Comment: 6 pack a day    Drug use: Never    Sexual activity: Yes     Partners: Female     Social Drivers of Health     Financial Resource Strain: Low Risk  (2025)    Overall Financial Resource Strain (CARDIA)     Difficulty of Paying Living Expenses: Not hard at all   Food Insecurity: No Food Insecurity (2025)    Hunger Vital Sign     Worried About Running Out of Food in the Last Year: Never true     Ran Out of Food in the Last Year: Never true   Transportation Needs: No Transportation Needs (2025)    PRAPARE - Transportation     Lack of Transportation (Medical): No     Lack of Transportation (Non-Medical): No   Physical Activity: Insufficiently Active (2025)    Exercise Vital Sign     Days of Exercise per Week: 4 days     Minutes of Exercise per Session: 30 min   Stress: No Stress Concern Present (2025)    [employer] [place] [employer] - Occupational Stress Questionnaire     Feeling  of Stress : Not at all   Housing Stability: Low Risk  (7/29/2025)    Housing Stability Vital Sign     Unable to Pay for Housing in the Last Year: No     Homeless in the Last Year: No

## 2025-07-30 NOTE — ASSESSMENT & PLAN NOTE
We will maintain home insulin regimen given patient is self-pay.  Try and optimize his 70 30 regimen  We will add sliding scale.  Increase 70 30

## 2025-07-30 NOTE — PLAN OF CARE
Problem: Adult Inpatient Plan of Care  Goal: Plan of Care Review  Outcome: Progressing  Goal: Patient-Specific Goal (Individualized)  Outcome: Progressing  Goal: Absence of Hospital-Acquired Illness or Injury  Outcome: Progressing  Goal: Optimal Comfort and Wellbeing  Outcome: Progressing     Problem: Sepsis/Septic Shock  Goal: Optimal Coping  Outcome: Progressing  Goal: Absence of Bleeding  Outcome: Progressing

## 2025-07-30 NOTE — PLAN OF CARE
[facility]  Initial Discharge Assessment       Primary Care Provider: No, Primary Doctor    Admission Diagnosis: Diabetic foot infection [E11.628, L08.9]  Chest pain [R07.9]  Cellulitis of multiple sites of lower extremity [L03.119]  Foot ulcer with necrosis of muscle, right [L97.513]  Diabetic ulcer of right midfoot associated with type 2 diabetes mellitus, unspecified ulcer stage [E11.621, L97.419]    Admission Date: 7/29/2025  Expected Discharge Date: 8/7/2025    Transition of Care Barriers: Unisured    Payor: /     Extended Emergency Contact Information  Primary Emergency Contact: Som Klein  Mobile Phone: [phone]  Relation: Relative  Preferred language: English   needed? No    Discharge Plan A: Home  Discharge Plan B: Home with family      [facility]  [address] [place] [id number]  Phone: [phone] Fax: [phone]      Initial Assessment (most recent)       Adult Discharge Assessment - 07/30/25 1001          Discharge Assessment    Assessment Type Discharge Planning Assessment     Source of Information patient     Communicated NIDHI with patient/caregiver Date not available/Unable to determine     People in Home significant other     Do you expect to return to your current living situation? Yes     Do you have help at home or someone to help you manage your care at home? Yes     Prior to hospitilization cognitive status: Unable to Assess     Current cognitive status: Alert/Oriented     Walking or Climbing Stairs Difficulty no     Dressing/Bathing Difficulty no     Equipment Currently Used at Home none     Do you currently have service(s) that help you manage your care at home? No     Do you take prescription medications? No     Do you have prescription coverage? No     How do you get to doctors appointments? car, drives self     Are you on dialysis? No     Discharge Plan A Home     Discharge Plan B Home with family     DME Needed Upon  Discharge  none     Transition of Care Barriers Unisured        Physical Activity    On average, how many days per week do you engage in moderate to strenuous exercise (like a brisk walk)? 4 days     On average, how many minutes do you engage in exercise at this level? 30 min                   Consult for help with ins for possible wound care and hh. Ss spoke with pt and pt lives at home with g/f and plans to return at d/c. Ss spoke with gogo with financial services and pt has been denied through [facility] [employer] at this time. Pt stated he works full time. Pt stated his niece is visiting today to help apply for health ins. Ss to f/u tomorrow to determine if pt was able to receive coverage or not. Ss following.

## 2025-07-30 NOTE — SUBJECTIVE & OBJECTIVE
Interval History:  No acute events overnight    Review of Systems  Objective:     Vital Signs (Most Recent):  Temp: 98.2 °F (36.8 °C) (07/30/25 0802)  Pulse: 83 (07/30/25 0802)  Resp: 18 (07/30/25 0802)  BP: 105/65 (07/30/25 0802)  SpO2: (!) 94 % (07/30/25 0802) Vital Signs (24h Range):  Temp:  [97.8 °F (36.6 °C)-100.7 °F (38.2 °C)] 98.2 °F (36.8 °C)  Pulse:  [75-99] 83  Resp:  [10-19] 18  SpO2:  [91 %-99 %] 94 %  BP: ()/(54-91) 105/65     Weight: 102.5 kg (226 lb)  Body mass index is 33.37 kg/m².    Intake/Output Summary (Last 24 hours) at 7/30/2025 1003  Last data filed at 7/30/2025 0412  Gross per 24 hour   Intake 1420.12 ml   Output --   Net 1420.12 ml         Physical Exam  Vitals reviewed.   Constitutional:       Appearance: Normal appearance.   HENT:      Head: Normocephalic and atraumatic.      Nose: Nose normal.   Eyes:      Conjunctiva/sclera: Conjunctivae normal.   Neck:      Trachea: Trachea normal.   Cardiovascular:      Rate and Rhythm: Normal rate and regular rhythm.      Pulses: Normal pulses.      Heart sounds: Normal heart sounds.   Pulmonary:      Effort: Pulmonary effort is normal.      Breath sounds: Normal breath sounds and air entry.   Abdominal:      Palpations: Abdomen is soft.   Musculoskeletal:      Comments: Wound right foot   Skin:     General: Skin is warm and dry.   Neurological:      General: No focal deficit present.      Mental Status: He is alert and oriented to person, place, and time.      Comments: Grossly normal motor and sensory function without focal deficit appreciated.   Psychiatric:         Mood and Affect: Mood and affect normal.         Behavior: Behavior is cooperative.               Significant Labs: All pertinent labs within the past 24 hours have been reviewed.    Significant Imaging: I have reviewed all pertinent imaging results/findings within the past 24 hours.

## 2025-07-30 NOTE — ASSESSMENT & PLAN NOTE
"Patient's FSGs are uncontrolled due to hyperglycemia on current medication regimen.  Last A1c reviewed-   Lab Results   Component Value Date    HGBA1C 11.6 (H) 07/02/2025     Most recent fingerstick glucose reviewed- No results for input(s): "POCTGLUCOSE" in the last 24 hours.  Current correctional scale  Low  Maintain anti-hyperglycemic dose as follows-   Antihyperglycemics (From admission, onward)      Start     Stop Route Frequency Ordered    07/30/25 0900  insulin NPH-insulin regular (70/30) injection 15 Units         -- SubQ 2 times daily 07/30/25 0822    07/29/25 1148  insulin aspart U-100 injection 0-5 Units         -- SubQ Before meals & nightly PRN 07/29/25 1050          Hold Oral hypoglycemics while patient is in the hospital.  "

## 2025-07-31 LAB
ANION GAP SERPL CALCULATED.3IONS-SCNC: 12 MMOL/L (ref 7–16)
BASOPHILS # BLD AUTO: 0.05 K/UL (ref 0–0.2)
BASOPHILS NFR BLD AUTO: 0.9 % (ref 0–1)
BUN SERPL-MCNC: 10 MG/DL (ref 9–21)
BUN/CREAT SERPL: 10 (ref 6–20)
CALCIUM SERPL-MCNC: 8.7 MG/DL (ref 8.4–10.2)
CHLORIDE SERPL-SCNC: 103 MMOL/L (ref 98–107)
CO2 SERPL-SCNC: 26 MMOL/L (ref 22–29)
CREAT SERPL-MCNC: 1.03 MG/DL (ref 0.72–1.25)
DIFFERENTIAL METHOD BLD: ABNORMAL
EGFR (NO RACE VARIABLE) (RUSH/TITUS): 90 ML/MIN/1.73M2
EOSINOPHIL # BLD AUTO: 0.2 K/UL (ref 0–0.5)
EOSINOPHIL NFR BLD AUTO: 3.8 % (ref 1–4)
ERYTHROCYTE [DISTWIDTH] IN BLOOD BY AUTOMATED COUNT: 11.9 % (ref 11.5–14.5)
GLUCOSE SERPL-MCNC: 243 MG/DL (ref 74–100)
GLUCOSE SERPL-MCNC: 264 MG/DL (ref 70–105)
GLUCOSE SERPL-MCNC: 281 MG/DL (ref 70–105)
GLUCOSE SERPL-MCNC: 301 MG/DL (ref 70–105)
GLUCOSE SERPL-MCNC: 344 MG/DL (ref 70–105)
HCT VFR BLD AUTO: 38.8 % (ref 40–54)
HGB BLD-MCNC: 12.5 G/DL (ref 13.5–18)
IMM GRANULOCYTES # BLD AUTO: 0.01 K/UL (ref 0–0.04)
IMM GRANULOCYTES NFR BLD: 0.2 % (ref 0–0.4)
LYMPHOCYTES # BLD AUTO: 1.5 K/UL (ref 1–4.8)
LYMPHOCYTES NFR BLD AUTO: 28.4 % (ref 27–41)
MAGNESIUM SERPL-MCNC: 1.9 MG/DL (ref 1.6–2.6)
MCH RBC QN AUTO: 30.7 PG (ref 27–31)
MCHC RBC AUTO-ENTMCNC: 32.2 G/DL (ref 32–36)
MCV RBC AUTO: 95.3 FL (ref 80–96)
MICROORGANISM SPEC CULT: ABNORMAL
MONOCYTES # BLD AUTO: 0.74 K/UL (ref 0–0.8)
MONOCYTES NFR BLD AUTO: 14 % (ref 2–6)
MPC BLD CALC-MCNC: 10.5 FL (ref 9.4–12.4)
NEUTROPHILS # BLD AUTO: 2.79 K/UL (ref 1.8–7.7)
NEUTROPHILS NFR BLD AUTO: 52.7 % (ref 53–65)
NRBC # BLD AUTO: 0 X10E3/UL
NRBC, AUTO (.00): 0 %
PHOSPHATE SERPL-MCNC: 2.9 MG/DL (ref 2.3–4.7)
PLATELET # BLD AUTO: 250 K/UL (ref 150–400)
POTASSIUM SERPL-SCNC: 3.5 MMOL/L (ref 3.5–5.1)
RBC # BLD AUTO: 4.07 M/UL (ref 4.6–6.2)
SODIUM SERPL-SCNC: 137 MMOL/L (ref 136–145)
WBC # BLD AUTO: 5.29 K/UL (ref 4.5–11)

## 2025-07-31 PROCEDURE — 83735 ASSAY OF MAGNESIUM: CPT | Performed by: STUDENT IN AN ORGANIZED HEALTH CARE EDUCATION/TRAINING PROGRAM

## 2025-07-31 PROCEDURE — 80048 BASIC METABOLIC PNL TOTAL CA: CPT | Performed by: STUDENT IN AN ORGANIZED HEALTH CARE EDUCATION/TRAINING PROGRAM

## 2025-07-31 PROCEDURE — 84100 ASSAY OF PHOSPHORUS: CPT | Performed by: STUDENT IN AN ORGANIZED HEALTH CARE EDUCATION/TRAINING PROGRAM

## 2025-07-31 PROCEDURE — 63600175 PHARM REV CODE 636 W HCPCS: Performed by: STUDENT IN AN ORGANIZED HEALTH CARE EDUCATION/TRAINING PROGRAM

## 2025-07-31 PROCEDURE — 11000001 HC ACUTE MED/SURG PRIVATE ROOM

## 2025-07-31 PROCEDURE — 97530 THERAPEUTIC ACTIVITIES: CPT

## 2025-07-31 PROCEDURE — 36415 COLL VENOUS BLD VENIPUNCTURE: CPT | Performed by: STUDENT IN AN ORGANIZED HEALTH CARE EDUCATION/TRAINING PROGRAM

## 2025-07-31 PROCEDURE — 63600175 PHARM REV CODE 636 W HCPCS

## 2025-07-31 PROCEDURE — 99233 SBSQ HOSP IP/OBS HIGH 50: CPT | Mod: ,,, | Performed by: STUDENT IN AN ORGANIZED HEALTH CARE EDUCATION/TRAINING PROGRAM

## 2025-07-31 PROCEDURE — 25000003 PHARM REV CODE 250: Performed by: STUDENT IN AN ORGANIZED HEALTH CARE EDUCATION/TRAINING PROGRAM

## 2025-07-31 PROCEDURE — 94761 N-INVAS EAR/PLS OXIMETRY MLT: CPT

## 2025-07-31 PROCEDURE — 97161 PT EVAL LOW COMPLEX 20 MIN: CPT

## 2025-07-31 PROCEDURE — 97165 OT EVAL LOW COMPLEX 30 MIN: CPT

## 2025-07-31 PROCEDURE — 27000207 HC ISOLATION

## 2025-07-31 PROCEDURE — 82962 GLUCOSE BLOOD TEST: CPT

## 2025-07-31 PROCEDURE — 85025 COMPLETE CBC W/AUTO DIFF WBC: CPT | Performed by: STUDENT IN AN ORGANIZED HEALTH CARE EDUCATION/TRAINING PROGRAM

## 2025-07-31 RX ADMIN — LOSARTAN POTASSIUM 50 MG: 50 TABLET, FILM COATED ORAL at 08:07

## 2025-07-31 RX ADMIN — ATORVASTATIN CALCIUM 40 MG: 40 TABLET, FILM COATED ORAL at 08:07

## 2025-07-31 RX ADMIN — INSULIN HUMAN 15 UNITS: 100 INJECTION, SUSPENSION SUBCUTANEOUS at 08:07

## 2025-07-31 RX ADMIN — INSULIN ASPART 4 UNITS: 100 INJECTION, SOLUTION INTRAVENOUS; SUBCUTANEOUS at 05:07

## 2025-07-31 RX ADMIN — PIPERACILLIN SODIUM AND TAZOBACTAM SODIUM 4.5 G: 4; .5 INJECTION, POWDER, FOR SOLUTION INTRAVENOUS at 08:07

## 2025-07-31 RX ADMIN — OXYCODONE HYDROCHLORIDE 5 MG: 5 TABLET ORAL at 12:07

## 2025-07-31 RX ADMIN — SODIUM CHLORIDE 2000 MG: 9 INJECTION, SOLUTION INTRAVENOUS at 06:07

## 2025-07-31 RX ADMIN — INSULIN HUMAN 20 UNITS: 100 INJECTION, SUSPENSION SUBCUTANEOUS at 09:07

## 2025-07-31 RX ADMIN — MUPIROCIN: 20 OINTMENT TOPICAL at 09:07

## 2025-07-31 RX ADMIN — ENOXAPARIN SODIUM 40 MG: 40 INJECTION SUBCUTANEOUS at 05:07

## 2025-07-31 RX ADMIN — PIPERACILLIN SODIUM AND TAZOBACTAM SODIUM 4.5 G: 4; .5 INJECTION, POWDER, FOR SOLUTION INTRAVENOUS at 05:07

## 2025-07-31 RX ADMIN — OXYCODONE HYDROCHLORIDE 5 MG: 5 TABLET ORAL at 07:07

## 2025-07-31 RX ADMIN — PIPERACILLIN SODIUM AND TAZOBACTAM SODIUM 4.5 G: 4; .5 INJECTION, POWDER, FOR SOLUTION INTRAVENOUS at 12:07

## 2025-07-31 RX ADMIN — HYDROCHLOROTHIAZIDE 12.5 MG: 12.5 TABLET ORAL at 08:07

## 2025-07-31 RX ADMIN — AMLODIPINE BESYLATE 5 MG: 5 TABLET ORAL at 08:07

## 2025-07-31 RX ADMIN — INSULIN ASPART 2 UNITS: 100 INJECTION, SOLUTION INTRAVENOUS; SUBCUTANEOUS at 09:07

## 2025-07-31 RX ADMIN — MUPIROCIN: 20 OINTMENT TOPICAL at 08:07

## 2025-07-31 NOTE — ASSESSMENT & PLAN NOTE
Dangers of cigarette smoking were reviewed with patient in detail. Patient was not counseled Nicotine replacement options were discussed. Nicotine replacement was discussed- not prescribed

## 2025-07-31 NOTE — SUBJECTIVE & OBJECTIVE
Interval History:  No acute events overnight    Review of Systems  Objective:     Vital Signs (Most Recent):  Temp: 98 °F (36.7 °C) (07/31/25 0743)  Pulse: 73 (07/31/25 0743)  Resp: 18 (07/31/25 0743)  BP: (!) 156/97 (07/31/25 0743)  SpO2: 95 % (07/31/25 0743) Vital Signs (24h Range):  Temp:  [98 °F (36.7 °C)-98.9 °F (37.2 °C)] 98 °F (36.7 °C)  Pulse:  [69-82] 73  Resp:  [18-19] 18  SpO2:  [92 %-96 %] 95 %  BP: (126-156)/(80-97) 156/97     Weight: 102.5 kg (226 lb)  Body mass index is 33.37 kg/m².  No intake or output data in the 24 hours ending 07/31/25 1145        Physical Exam  Vitals reviewed.   Constitutional:       Appearance: Normal appearance.   HENT:      Head: Normocephalic and atraumatic.      Nose: Nose normal.   Eyes:      Conjunctiva/sclera: Conjunctivae normal.   Neck:      Trachea: Trachea normal.   Cardiovascular:      Rate and Rhythm: Normal rate and regular rhythm.      Pulses: Normal pulses.      Heart sounds: Normal heart sounds.   Pulmonary:      Effort: Pulmonary effort is normal.      Breath sounds: Normal breath sounds and air entry.   Abdominal:      Palpations: Abdomen is soft.   Musculoskeletal:      Comments: Wound right foot   Skin:     General: Skin is warm and dry.   Neurological:      General: No focal deficit present.      Mental Status: He is alert and oriented to person, place, and time.      Comments: Grossly normal motor and sensory function without focal deficit appreciated.   Psychiatric:         Mood and Affect: Mood and affect normal.         Behavior: Behavior is cooperative.               Significant Labs: All pertinent labs within the past 24 hours have been reviewed.    Significant Imaging: I have reviewed all pertinent imaging results/findings within the past 24 hours.

## 2025-07-31 NOTE — ASSESSMENT & PLAN NOTE
"Patient's FSGs are uncontrolled due to hyperglycemia on current medication regimen.  Last A1c reviewed-   Lab Results   Component Value Date    HGBA1C 11.6 (H) 07/02/2025     Most recent fingerstick glucose reviewed- No results for input(s): "POCTGLUCOSE" in the last 24 hours.  Current correctional scale  Medium  Maintain anti-hyperglycemic dose as follows-   Antihyperglycemics (From admission, onward)      Start     Stop Route Frequency Ordered    07/31/25 2100  insulin NPH-insulin regular (70/30) injection 20 Units         -- SubQ 2 times daily 07/31/25 1144    07/29/25 1148  insulin aspart U-100 injection 0-5 Units         -- SubQ Before meals & nightly PRN 07/29/25 1050          Hold Oral hypoglycemics while patient is in the hospital.  "

## 2025-07-31 NOTE — ASSESSMENT & PLAN NOTE
Patient's blood pressure range in the last 24 hours was: BP  Min: 126/82  Max: 156/97.The patient's inpatient anti-hypertensive regimen is listed below:  Current Antihypertensives  amLODIPine tablet 5 mg, Daily, Oral  losartan tablet 50 mg, Daily, Oral  hydroCHLOROthiazide tablet 12.5 mg, Daily, Oral    Plan  - BP is controlled, no changes needed to their regimen  - follow

## 2025-07-31 NOTE — ASSESSMENT & PLAN NOTE
"Patient's FSGs are uncontrolled due to hyperglycemia on current medication regimen.  Last A1c reviewed-   Lab Results   Component Value Date    HGBA1C 11.6 (H) 07/02/2025     Most recent fingerstick glucose reviewed- No results for input(s): "POCTGLUCOSE" in the last 24 hours.  Current correctional scale  Low  Maintain anti-hyperglycemic dose as follows-   Antihyperglycemics (From admission, onward)      Start     Stop Route Frequency Ordered    07/31/25 2100  insulin NPH-insulin regular (70/30) injection 20 Units         -- SubQ 2 times daily 07/31/25 1144    07/29/25 1148  insulin aspart U-100 injection 0-5 Units         -- SubQ Before meals & nightly PRN 07/29/25 1050          Hold Oral hypoglycemics while patient is in the hospital.  7/30 Debridement yesterday  Continue vancomycin and Zosyn  7/31 can discontinue vancomycin, continue Zosyn  "

## 2025-07-31 NOTE — ASSESSMENT & PLAN NOTE
"Patient's FSGs are uncontrolled due to hyperglycemia on current medication regimen.  Last A1c reviewed-   Lab Results   Component Value Date    HGBA1C 11.6 (H) 07/02/2025     Most recent fingerstick glucose reviewed- No results for input(s): "POCTGLUCOSE" in the last 24 hours.  Current correctional scale  Low  Maintain anti-hyperglycemic dose as follows-   Antihyperglycemics (From admission, onward)      Start     Stop Route Frequency Ordered    07/31/25 2100  insulin NPH-insulin regular (70/30) injection 20 Units         -- SubQ 2 times daily 07/31/25 1144    07/29/25 1148  insulin aspart U-100 injection 0-5 Units         -- SubQ Before meals & nightly PRN 07/29/25 1050          Hold Oral hypoglycemics while patient is in the hospital.  "

## 2025-07-31 NOTE — DISCHARGE INSTRUCTIONS
) No driving until follow up appointment.  2) May shower on day that dressing removed. (No tub Bathe).  3) Notify your healthcare provider if you experience any of the following: temperature >100.4  4) Notify your healthcare provider if you experience any of the following: redness, tenderness, or      Or signs of infection (pain, swelling, redness, odor or green/ yellow discharge around incision site.)  5) Notify your healthcare provider if you experience any of the following: difficulty breathing or     increased cough.  6)Clean incision daily with antibacterial soap/water, then pat dry.   7)Notify your physician if you experience any persistent nausea, vomiting, or diarrhea or headache  8)Notify your physician if you experience any of the following:severe uncontrolled pain;worsening rash, increased confusion or weakness; dizziness, lightheadedness or visual disturbances           Ankle Pump        Bend ankles up and down, alternating feet.  Repeat 30 times. Do 2 sessions per day.       Quad Set        Slowly tighten muscles on thigh of right straight leg while counting out loud to 5 .   Repeat 30 times. Repeat on the left 30 times. Do 2 sessions per day.           Gluteal Sets        Squeeze pelvic floor and hold. Tighten bottom. Repeat 30 times. Do 2 sessions a day.           Heel Slide        Bend knee and pull right heel toward buttocks. Straighten out the leg.  Repeat 30 times. Repeat on the left 30 times. Do 2 sessions per day.           Hip Abduction / Adduction: with Extended Knee (Supine)        Bring right leg out to side and return to midline position. Keep knee straight.  Repeat 30 times. Repeat on the left 30 times. Do 2 sessions per day.            Straight Leg Raise        Bend left leg. Raise right leg 8-12 inches with knee locked. Exhale and tighten thigh muscles while raising leg.   Repeat 30 times. Switch legs and repeat 30 times. Do 2 sessions per day.              KNEE: Extension, Long Arc  Quads - Sitting        Raise right foot until knee is straight. Return foot to the floor.  Repeat 30 times. Repeat on the left 30 times. Do 2 sessions per day.       Copyright © I. All rights reserved.

## 2025-07-31 NOTE — PROGRESS NOTES
[facility] Medicine  Progress Note    Patient Name: Wallace Pedro  MRN: [id number]  Patient Class: IP- Inpatient   Admission Date: 7/29/2025  Length of Stay: 2 days  Attending Physician: Chacorta Yip DO  Primary Care Provider: Jamilah, Primary Doctor        Subjective     Principal Problem:Diabetic foot ulcer        HPI:  49-year-old white male with a past medical history of insulin dependent diabetes, diabetic foot wound in the right foot, diabetic neuropathy, diabetic dyslipidemia hypertension obesity presents to the ED after evaluation in surgery clinic today.  Patient had a debridement of a right foot wound a few weeks ago.  Was kept in-house on IV antibiotics for several days and discharged home with doxycycline.  Patient has been taking his antibiotic.  His girlfriend has been helping him with wound care.  Over the last few days the wound has been to drain more in developed a worsening odor.  Presented to surgery Clinic today for follow up.  Sent to the ED for admission and debridement and IV antibiotics this afternoon.  Review of systems otherwise negative    Overview/Hospital Course:  7/30 awaiting cultures.  Increase insulin  7/31 increase insulin.  Patient has Enterococcus Enterobacter E coli and GBS grown in his wound.  He has Enterococcus GBS and preliminary DNR is in the bone we would assume this is E coli given wound cultures.  All should be covered with Zosyn.    Interval History:  No acute events overnight    Review of Systems  Objective:     Vital Signs (Most Recent):  Temp: 98 °F (36.7 °C) (07/31/25 0743)  Pulse: 73 (07/31/25 0743)  Resp: 18 (07/31/25 0743)  BP: (!) 156/97 (07/31/25 0743)  SpO2: 95 % (07/31/25 0743) Vital Signs (24h Range):  Temp:  [98 °F (36.7 °C)-98.9 °F (37.2 °C)] 98 °F (36.7 °C)  Pulse:  [69-82] 73  Resp:  [18-19] 18  SpO2:  [92 %-96 %] 95 %  BP: (126-156)/(80-97) 156/97     Weight: 102.5 kg (226 lb)  Body mass index is 33.37 kg/m².  No intake or  "output data in the 24 hours ending 07/31/25 1145        Physical Exam  Vitals reviewed.   Constitutional:       Appearance: Normal appearance.   HENT:      Head: Normocephalic and atraumatic.      Nose: Nose normal.   Eyes:      Conjunctiva/sclera: Conjunctivae normal.   Neck:      Trachea: Trachea normal.   Cardiovascular:      Rate and Rhythm: Normal rate and regular rhythm.      Pulses: Normal pulses.      Heart sounds: Normal heart sounds.   Pulmonary:      Effort: Pulmonary effort is normal.      Breath sounds: Normal breath sounds and air entry.   Abdominal:      Palpations: Abdomen is soft.   Musculoskeletal:      Comments: Wound right foot   Skin:     General: Skin is warm and dry.   Neurological:      General: No focal deficit present.      Mental Status: He is alert and oriented to person, place, and time.      Comments: Grossly normal motor and sensory function without focal deficit appreciated.   Psychiatric:         Mood and Affect: Mood and affect normal.         Behavior: Behavior is cooperative.               Significant Labs: All pertinent labs within the past 24 hours have been reviewed.    Significant Imaging: I have reviewed all pertinent imaging results/findings within the past 24 hours.      Assessment & Plan  Diabetic foot ulcer  Patient's FSGs are uncontrolled due to hyperglycemia on current medication regimen.  Last A1c reviewed-   Lab Results   Component Value Date    HGBA1C 11.6 (H) 07/02/2025     Most recent fingerstick glucose reviewed- No results for input(s): "POCTGLUCOSE" in the last 24 hours.  Current correctional scale  Low  Maintain anti-hyperglycemic dose as follows-   Antihyperglycemics (From admission, onward)      Start     Stop Route Frequency Ordered    07/31/25 2100  insulin NPH-insulin regular (70/30) injection 20 Units         -- SubQ 2 times daily 07/31/25 1144    07/29/25 1148  insulin aspart U-100 injection 0-5 Units         -- SubQ Before meals & nightly PRN 07/29/25 " "1050          Hold Oral hypoglycemics while patient is in the hospital.  7/30 Debridement yesterday  Continue vancomycin and Zosyn  7/31 can discontinue vancomycin, continue Zosyn  Essential hypertension  Patient's blood pressure range in the last 24 hours was: BP  Min: 126/82  Max: 156/97.The patient's inpatient anti-hypertensive regimen is listed below:  Current Antihypertensives  amLODIPine tablet 5 mg, Daily, Oral  losartan tablet 50 mg, Daily, Oral  hydroCHLOROthiazide tablet 12.5 mg, Daily, Oral    Plan  - BP is controlled, no changes needed to their regimen  - follow  Cellulitis of multiple sites of lower extremity  IV antibiotics surgical debridement  Zosyn  Type 2 diabetes mellitus with hyperglycemia, without long-term current use of insulin  We will maintain home insulin regimen given patient is self-pay.  Try and optimize his 70 30 regimen  We will add sliding scale.  Increase 70 30  7/31 increase 70 30 again today  Other hyperlipidemia  Statin    Foot ulcer with necrosis of muscle, right  Debridement today    Diabetic peripheral vascular disease  Glycemic control statin  Improve glycemic control  Diabetic nephropathy  Glycemic control continue Arb  Continue ARB  Continue Arb  Obesity (BMI 30-39.9)  Body mass index is 33.37 kg/m². Morbid obesity complicates all aspects of disease management from diagnostic modalities to treatment. Weight loss encouraged and health benefits explained to patient.       DM type 2 with diabetic dyslipidemia  Patient's FSGs are uncontrolled due to hyperglycemia on current medication regimen.  Last A1c reviewed-   Lab Results   Component Value Date    HGBA1C 11.6 (H) 07/02/2025     Most recent fingerstick glucose reviewed- No results for input(s): "POCTGLUCOSE" in the last 24 hours.  Current correctional scale  Low  Maintain anti-hyperglycemic dose as follows-   Antihyperglycemics (From admission, onward)      Start     Stop Route Frequency Ordered    07/31/25 2100  insulin " "NPH-insulin regular (70/30) injection 20 Units         -- SubQ 2 times daily 07/31/25 1144    07/29/25 1148  insulin aspart U-100 injection 0-5 Units         -- SubQ Before meals & nightly PRN 07/29/25 1050          Hold Oral hypoglycemics while patient is in the hospital.  Diabetic foot infection  Patient's FSGs are uncontrolled due to hyperglycemia on current medication regimen.  Last A1c reviewed-   Lab Results   Component Value Date    HGBA1C 11.6 (H) 07/02/2025     Most recent fingerstick glucose reviewed- No results for input(s): "POCTGLUCOSE" in the last 24 hours.  Current correctional scale  Medium  Maintain anti-hyperglycemic dose as follows-   Antihyperglycemics (From admission, onward)      Start     Stop Route Frequency Ordered    07/31/25 2100  insulin NPH-insulin regular (70/30) injection 20 Units         -- SubQ 2 times daily 07/31/25 1144    07/29/25 1148  insulin aspart U-100 injection 0-5 Units         -- SubQ Before meals & nightly PRN 07/29/25 1050          Hold Oral hypoglycemics while patient is in the hospital.  Tobacco dependency  Dangers of cigarette smoking were reviewed with patient in detail. Patient was not counseled Nicotine replacement options were discussed. Nicotine replacement was discussed- not prescribed  VTE Risk Mitigation (From admission, onward)           Ordered     enoxaparin injection 40 mg  Daily         07/29/25 1050     IP VTE HIGH RISK PATIENT  Once         07/29/25 1050     Place sequential compression device  Until discontinued         07/29/25 1050                    Discharge Planning   NIDHI: 8/7/2025     Code Status: Full Code   Medical Readiness for Discharge Date:   Discharge Plan A: Home          51 minutes spent on face to face patient interaction, discussion with family, ancillary staff, and/or other physicians as well as review of pertinent labs, images, and notes.                 Chacorta Yip,   Department of Hospital Medicine   [facility]

## 2025-07-31 NOTE — PLAN OF CARE
Problem: Physical Therapy  Goal: Physical Therapy Goal  Description: Short Term Goals to be met by: 25    Patient will increase functional independence with mobility by performin. Supine to sit with Modified [place]  2. Sit to stand transfer with Modified [place]  3. Bed to chair transfer with Modified [place] using Rolling Walker,  NWB R LE  4. Gait  x 100 feet with Modified [place] using Rolling Walker,  NWB R LE.   5. Lower extremity exercise program x30 reps per handout, with assistance as needed      Long Term Goals to be met by: 25     1. Pt with regain full independent functional mobility to return to prior activities of daily living   Outcome: Progressing     PT eval completed. Please see eval note for details.

## 2025-07-31 NOTE — ASSESSMENT & PLAN NOTE
We will maintain home insulin regimen given patient is self-pay.  Try and optimize his 70 30 regimen  We will add sliding scale.  Increase 70 30  7/31 increase 70 30 again today

## 2025-07-31 NOTE — PLAN OF CARE
Problem: Adult Inpatient Plan of Care  Goal: Plan of Care Review  7/31/2025 0559 by Chacorta Arauz RN  Outcome: Progressing  7/31/2025 0557 by Chacorta Arauz RN  Outcome: Progressing  Goal: Patient-Specific Goal (Individualized)  7/31/2025 0559 by Chacorta Arauz RN  Outcome: Progressing  7/31/2025 0557 by Chacorta Arauz RN  Outcome: Progressing  Goal: Absence of Hospital-Acquired Illness or Injury  7/31/2025 0559 by Chacorta Arauz RN  Outcome: Progressing  7/31/2025 0557 by Chacorta Arauz RN  Outcome: Progressing  Goal: Optimal Comfort and Wellbeing  7/31/2025 0559 by Chacorta Arauz RN  Outcome: Progressing  7/31/2025 0557 by Chacorta Arauz RN  Outcome: Progressing  Goal: Readiness for Transition of Care  7/31/2025 0559 by Chacorta Arauz RN  Outcome: Progressing  7/31/2025 0557 by Chacorta Arauz RN  Outcome: Progressing     Problem: Sepsis/Septic Shock  Goal: Optimal Coping  7/31/2025 0559 by Chacorta Arauz RN  Outcome: Progressing  7/31/2025 0557 by Chacorta Arauz RN  Outcome: Progressing  Goal: Absence of Bleeding  7/31/2025 0559 by Chacorat Arauz RN  Outcome: Progressing  7/31/2025 0557 by Chacorta Arauz RN  Outcome: Progressing  Goal: Blood Glucose Level Within Targeted Range  7/31/2025 0559 by Chacorta Arauz RN  Outcome: Progressing  7/31/2025 0557 by Chacorta Arauz RN  Outcome: Progressing  Goal: Absence of Infection Signs and Symptoms  7/31/2025 0559 by Chacorta Arauz RN  Outcome: Progressing  7/31/2025 0557 by Chacorta Arauz RN  Outcome: Progressing  Goal: Optimal Nutrition Intake  7/31/2025 0559 by Chacorta Arauz RN  Outcome: Progressing  7/31/2025 0557 by Chacorta Arauz RN  Outcome: Progressing     Problem: Diabetes Comorbidity  Goal: Blood Glucose Level Within Targeted Range  7/31/2025 0559 by Chacorta Arauz RN  Outcome: Progressing  7/31/2025 0557 by Chacorta Arauz RN  Outcome: Progressing     Problem: Wound  Goal: Optimal Coping  7/31/2025 0559 by Davonte  [facility] [place]  Outcome: Progressing  7/31/2025 0557 by Chacorta Arauz RN  Outcome: Progressing  Goal: Optimal Functional Ability  7/31/2025 0559 by Chacorta Arauz RN  Outcome: Progressing  7/31/2025 0557 by Chacorta Arauz RN  Outcome: Progressing  Goal: Absence of Infection Signs and Symptoms  7/31/2025 0559 by Chacorta Arauz RN  Outcome: Progressing  7/31/2025 0557 by Chacorta Arauz RN  Outcome: Progressing  Goal: Improved Oral Intake  7/31/2025 0559 by Chacorta Arauz RN  Outcome: Progressing  7/31/2025 0557 by Chacorta Arauz RN  Outcome: Progressing  Goal: Optimal Pain Control and Function  7/31/2025 0559 by Chacorta Arauz RN  Outcome: Progressing  7/31/2025 0557 by Chacorta Arauz RN  Outcome: Progressing  Goal: Skin Health and Integrity  7/31/2025 0559 by Chacorta Arauz RN  Outcome: Progressing  7/31/2025 0557 by Chacorta Arauz RN  Outcome: Progressing  Goal: Optimal Wound Healing  7/31/2025 0559 by Chacorta Arauz RN  Outcome: Progressing  7/31/2025 0557 by Chacorta Arauz RN  Outcome: Progressing     Problem: Fall Injury Risk  Goal: Absence of Fall and Fall-Related Injury  7/31/2025 0559 by Chacorta Arauz RN  Outcome: Progressing  7/31/2025 0557 by Chacorta Arauz RN  Outcome: Progressing

## 2025-07-31 NOTE — PT/OT/SLP EVAL
Occupational Therapy   Evaluation and Discharge Note    Name: Wallace Pedro  MRN: [id number]  Admitting Diagnosis: Diabetic foot ulcer  Recent Surgery: Procedure(s) (LRB):  DEBRIDEMENT, FOOT (Right)  AMPUTATION, 4th toe (Right) 2 Days Post-Op    Recommendations:     Discharge Recommendations: No Therapy Indicated  Discharge Equipment Recommendations: none  Barriers to discharge:  None    Assessment:     Wallace Pedro is a 49 y.o. male with a medical diagnosis of Diabetic foot ulcer. Pt able to maintain NWB to RLE with RW to perform ADL t/f. Pt independent with ADL tasks. No further OT services indicated at this time.     Plan:     During this hospitalization, patient does not require further acute OT services.  Please re-consult if situation changes.    Plan of Care Reviewed with: patient    Subjective     Chief Complaint: diabetic foot ulcer; s/p right fourth toe amputation  Patient/Family Comments/goals: pt agreeable to OT eval    Occupational Profile:  Living Environment: pt lives with significant other in one story home with steps to enter; pt reports family is building ramp   Previous level of function: independent with all ADL tasks and functional mobility   Roles and Routines: perform self care  Equipment Used at home: walker, rolling  Assistance upon Discharge: home with family assist    Pain/Comfort:  Pain Rating 1: 0/10    Patients cultural, spiritual, Episcopal conflicts given the current situation: no    Objective:     Communicated with: TRENT lAvarez prior to session.  Patient found HOB elevated with peripheral IV upon OT entry to room.    General Precautions: Standard, fall  Orthopedic Precautions: RLE non weight bearing  Braces: N/A  Respiratory Status: Room air     Occupational Performance:    Bed Mobility:    Patient completed Supine to Sit with modified independence    Functional Mobility/Transfers:  Patient completed Sit <> Stand Transfer with supervision  with  rolling walker   Functional Mobility: pt  performed functional mobility to door and back to bed with SBA with RW    Activities of Daily Living:  Lower Body Dressing: minimum assistance to nayana sock    Cognitive/Visual Perceptual:  Cognitive/Psychosocial Skills:     -       Oriented to: Person, Place, Time, and Situation   -       Follows Commands/attention:Follows multistep  commands  -       Mood/Affect/Coping skills/emotional control: Cooperative    Physical Exam:  Balance:    -       sitting balance SBA; standing balance SBA  Upper Extremity Range of Motion:     -       Right Upper Extremity: WFL  -       Left Upper Extremity: WFL  Upper Extremity Strength:    -       Right Upper Extremity: WFL  -       Left Upper Extremity: WFL  Gross motor coordination:   WFL    AMPAC 6 Click ADL:  AMPAC Total Score: 24    Treatment & Education:  Pt educated on OT POC.     Patient left sitting edge of bed with all lines intact, call button in reach, and TRENT Alvarez notified    GOALS:   Multidisciplinary Problems       Occupational Therapy Goals       Not on file                    DME Justifications:  No DME recommended requiring DME justifications    History:     Past Medical History:   Diagnosis Date    Diabetes mellitus, type 2     Hyperlipidemia     Hypertension          Past Surgical History:   Procedure Laterality Date    DEBRIDEMENT OF FOOT Right 7/13/2025    Procedure: DEBRIDEMENT, FOOT;  Surgeon: Moises Varela DO;  Location: [facility] OR;  Service: General;  Laterality: Right;    DEBRIDEMENT OF FOOT Right 7/29/2025    Procedure: DEBRIDEMENT, FOOT;  Surgeon: Moises Varela DO;  Location: [facility] OR;  Service: General;  Laterality: Right;    TOE AMPUTATION Right 7/29/2025    Procedure: AMPUTATION, 4th toe;  Surgeon: Moises Varela DO;  Location: [facility] OR;  Service: General;  Laterality: Right;  POSSIBLE MULTIPLE TOE AMPUTATION       Time Tracking:     OT Date of Treatment: 07/31/25  OT Start Time: 1417  OT Stop Time: 1438  OT Total Time (min): 21  min    Billable Minutes:Evaluation OT min complexity eval    7/31/2025

## 2025-07-31 NOTE — PROGRESS NOTES
[facility] - Orthopedic  General Surgery  Progress Note    Subjective:     Interval History:  Patient is awake and alert this morning on exam.  He has had no acute changes overnight.  He is tolerating regular diet for breakfast.  He denies nausea or vomiting.  Reports minimal pain to right foot at postoperative incision.  Dressing to right foot removed.  Granulation tissue noted in wound bed.  Wound bleeds easily when touch.  No significant blood loss noted.  We will continue to monitor patient's progress.  Packing removed.  Wound cleaned with Vashe and 4 x 4 gauze.  Repacked with wet to Dry Vashe dressing and Ace wrap.  We will continue to monitor patient's progress.    Post-Op Info:  Procedure(s) (LRB):  DEBRIDEMENT, FOOT (Right)  AMPUTATION, 4th toe (Right)   2 Days Post-Op      Medications:  Continuous Infusions:  Scheduled Meds:   amLODIPine  5 mg Oral Daily    atorvastatin  40 mg Oral Daily    enoxparin  40 mg Subcutaneous Daily    hydroCHLOROthiazide  12.5 mg Oral Daily    insulin NPH-insulin regular (70/30)  15 Units Subcutaneous BID    losartan  50 mg Oral Daily    mupirocin   Nasal BID    piperacillin-tazobactam (Zosyn) IV (PEDS and ADULTS) (extended infusion is not appropriate)  4.5 g Intravenous Q8H     PRN Meds:  Current Facility-Administered Medications:     acetaminophen, 650 mg, Oral, Q8H PRN    dextrose 50%, 12.5 g, Intravenous, PRN    dextrose 50%, 25 g, Intravenous, PRN    glucagon (human recombinant), 1 mg, Intramuscular, PRN    glucose, 16 g, Oral, PRN    glucose, 24 g, Oral, PRN    insulin aspart U-100, 0-5 Units, Subcutaneous, QID (AC + HS) PRN    morphine, 4 mg, Intravenous, Q4H PRN    naloxone, 0.02 mg, Intravenous, PRN    ondansetron, 4 mg, Intravenous, Q8H PRN    oxyCODONE, 5 mg, Oral, Q6H PRN    polyethylene glycol, 17 g, Oral, TID PRN    sodium chloride 0.9%, 10 mL, Intravenous, Q12H PRN    Pharmacy to dose Vancomycin consult, , , Once **AND** vancomycin - pharmacy to dose, ,  Intravenous, pharmacy to manage frequency     Objective:     Vital Signs (Most Recent):  Temp: 98 °F (36.7 °C) (07/31/25 0743)  Pulse: 73 (07/31/25 0743)  Resp: 18 (07/31/25 0743)  BP: (!) 156/97 (07/31/25 0743)  SpO2: 95 % (07/31/25 0743) Vital Signs (24h Range):  Temp:  [98 °F (36.7 °C)-98.9 °F (37.2 °C)] 98 °F (36.7 °C)  Pulse:  [69-82] 73  Resp:  [18-19] 18  SpO2:  [92 %-96 %] 95 %  BP: (126-156)/(80-97) 156/97     No intake or output data in the 24 hours ending 07/31/25 1135    Physical Exam  Vitals and nursing note reviewed.   Constitutional:       Appearance: He is obese.   HENT:      Head: Normocephalic.      Nose: Nose normal.      Mouth/Throat:      Mouth: Mucous membranes are moist.   Eyes:      Extraocular Movements: Extraocular movements intact.   Cardiovascular:      Rate and Rhythm: Normal rate.      Pulses: Normal pulses.   Pulmonary:      Effort: Pulmonary effort is normal.      Breath sounds: Normal breath sounds.   Abdominal:      General: Bowel sounds are normal.   Musculoskeletal:         General: Swelling and tenderness present. Normal range of motion.      Cervical back: Normal range of motion.      Right lower leg: Edema present.      Comments: Right lower extremity foot wound with amputation of 4th digit.  Minimal drainage noted.   Skin:     General: Skin is warm and dry.      Capillary Refill: Capillary refill takes 2 to 3 seconds.   Neurological:      General: No focal deficit present.      Mental Status: He is alert. He is disoriented.   Psychiatric:         Mood and Affect: Mood normal.         Significant Labs:  Recent Lab Results  (Last 5 results in the past 24 hours)        07/31/25  0743   07/31/25  0359   07/30/25 2012 07/30/25  1651   07/30/25  1214        Anion Gap   12             Baso #   0.05             Basophil %   0.9             BUN   10             BUN/CREAT RATIO   10             Calcium   8.7             Chloride   103             CO2   26             Creatinine    1.03             Differential Method   Auto             eGFR   90  Comment: Estimated GFR calculated using the CKD-EPI creatinine (2021) equation.             Eos #   0.20             Eos %   3.8             Glucose   243             Hematocrit   38.8             Hemoglobin   12.5             Immature Grans (Abs)   0.01             Immature Granulocytes   0.2             Lymph #   1.50             Lymph %   28.4             Magnesium    1.9             MCH   30.7             MCHC   32.2             MCV   95.3             Mono #   0.74             Mono %   14.0             MPV   10.5             Neutrophils, Abs   2.79             Neutrophils Relative   52.7             nRBC   0.0             NUCLEATED RBC ABSOLUTE   0.00             Phosphorus Level   2.9             Platelet Count   250             POC Glucose 264     373   294   268       Potassium   3.5             RBC   4.07             RDW   11.9             Sodium   137             WBC   5.29                                  All pertinent labs from the last 24 hours have been reviewed.    Significant Diagnostics:  I have reviewed all pertinent imaging results/findings within the past 24 hours.    Assessment/Plan:     Active Diagnoses:    Diagnosis Date Noted POA    PRINCIPAL PROBLEM:  Diabetic foot ulcer [E11.621, L97.509] 07/15/2025 Yes     Chronic    Tobacco dependency [F17.200] 07/30/2025 Yes    Obesity (BMI 30-39.9) [E66.9] 07/29/2025 Yes    DM type 2 with diabetic dyslipidemia [E11.69, E78.5] 07/29/2025 Yes    Diabetic foot infection [E11.628, L08.9] 07/29/2025 Yes    Diabetic peripheral vascular disease [E11.51] 07/15/2025 Yes    Diabetic nephropathy [E11.21] 07/15/2025 Yes    Foot ulcer with necrosis of muscle, right [L97.513] 07/13/2025 Yes    Cellulitis of multiple sites of lower extremity [L03.119] 05/04/2024 Yes    Type 2 diabetes mellitus with hyperglycemia, without long-term current use of insulin [E11.65] 05/04/2024 Yes    Other hyperlipidemia  [E78.49] 05/04/2024 Yes    Essential hypertension [I10] 12/28/2022 Yes      Problems Resolved During this Admission:         GERRY Davidson  General Surgery  [facility]

## 2025-07-31 NOTE — PROGRESS NOTES
"Pharmacokinetic Assessment Follow Up: IV Vancomycin      Vancomycin Regimen Plan:    Pt's Scr has increased daily since starting vanc.  Pt's Scr has now gone from 0.78 to 1.03.  Pharmacy has placed vanc on hold pending his trough level on 8/1 at 0030.    Drug levels (last 3 results):  No results for input(s): "VANCOMYCINRA", "VANCORANDOM", "VANCOMYCINPE", "VANCOPEAK", "VANCOMYCINTR", "VANCOTROUGH" in the last 72 hours.    Pharmacy will continue to follow and monitor vancomycin.    Please contact pharmacy at extension [phone] for questions regarding this assessment.    Patient brief summary:  Wallace Pedro is a 49 y.o. male initiated on antimicrobial therapy with IV Vancomycin for treatment of bone/joint infection    The patient's current regimen is on hold pending a trough level    Drug Allergies:   Review of patient's allergies indicates:  No Known Allergies    Actual Body Weight:   102.5 kg    Renal Function:   Estimated Creatinine Clearance: 104.6 mL/min (based on SCr of 1.03 mg/dL).,     Dialysis Method (if applicable):  N/A    CBC (last 72 hours):  Recent Labs   Lab Result Units 07/29/25  1046 07/30/25  0326 07/31/25  0359   WBC K/uL 9.99 8.51 5.29   Hemoglobin g/dL 14.3 11.9* 12.5*   Hematocrit % 42.8 36.9* 38.8*   Platelet Count K/uL 352 266 250   Lymphocytes % % 18.7* 13.9* 28.4   Monocytes % % 9.5* 10.5* 14.0*   Eosinophils % % 1.1 1.4 3.8   Basophils % % 0.5 0.7 0.9   Diff Type  Auto Auto Auto       Metabolic Panel (last 72 hours):  Recent Labs   Lab Result Units 07/29/25  1046 07/29/25  1157 07/30/25  0326 07/31/25  0359   Sodium mmol/L 134*  --  136 137   Potassium mmol/L 3.9  --  3.7 3.5   Chloride mmol/L 99  --  102 103   CO2 mmol/L 26  --  25 26   Glucose mg/dL 404*  --  226* 243*   Glucose, UA mg/dL  --  >1000*  --   --    BUN mg/dL 9  --  9 10   Creatinine mg/dL 0.78  --  0.93 1.03   Albumin g/dL 2.8*  --   --   --    Bilirubin, Total mg/dL 0.4  --   --   --    Alk Phos U/L 118  --   --   --    AST U/L " 19  --   --   --    ALT U/L 9  --   --   --    Magnesium mg/dL  --   --  1.5* 1.9   Phosphorus mg/dL  --   --  3.7 2.9       Vancomycin Administrations:  vancomycin given in the last 96 hours                     vancomycin (VANCOCIN) 2,000 mg in 0.9% NaCl 500 mL IVPB (mg) 2,000 mg New Bag 07/31/25 0617     2,000 mg New Bag 07/30/25 1246     2,000 mg New Bag 07/29/25 1325                    Microbiologic Results:  Microbiology Results (last 7 days)       Procedure Component Value Units Date/Time    Culture, Bone [[id number]]  (Abnormal)  (Susceptibility) Collected: 07/29/25 1542    Order Status: Completed Specimen: Bone from Toe Updated: 07/31/25 0751     Culture, Bone Light Growth Enterococcus faecalis      Light Growth Streptococcus agalactiae (Group B)     Comment: Susceptibility testing is not routinely done on this isolate due to it's universial susceptibility to Penicillin and other beta-lactams.         Rare growth Gram-negative Bacilli     Comment: Identification and Susceptibility to Follow       Culture, Wound [[id number]]  (Abnormal)  (Susceptibility) Collected: 07/29/25 1537    Order Status: Completed Specimen: Wound from Toe Updated: 07/31/25 0721     Culture, Wound/Abscess Heavy Growth Enterococcus faecalis      Moderate Growth Enterobacter cloacae      Light Growth Escherichia coli      Heavy Growth Streptococcus agalactiae (Group B)     Comment: Susceptibility testing is not routinely done on this isolate due to it's universial susceptibility to Penicillin and other beta-lactams.       Blood culture #2 [[id number]] Collected: 07/29/25 1138    Order Status: Completed Specimen: Blood from Wrist, Right Updated: 07/31/25 0645     Culture, Blood No Growth At 24 Hours    Blood culture #1 [[id number]] Collected: 07/29/25 1132    Order Status: Completed Specimen: Blood from Peripheral, Upper Arm, Right Updated: 07/31/25 0645     Culture, Blood No Growth At 24 Hours    Culture, Anaerobe [[id number]] Collected:  07/29/25 1537    Order Status: Sent Specimen: Wound from Toe Updated: 07/29/25 9537

## 2025-07-31 NOTE — PT/OT/SLP EVAL
Physical Therapy Evaluation and Treatment    Patient Name: Wallace Pedro   MRN: [id number]  Recent Surgery: Procedure(s) (LRB):  DEBRIDEMENT, FOOT (Right)  AMPUTATION, 4th toe (Right) 2 Days Post-Op    Recommendations:     Discharge Recommendations: No Therapy Indicated   Discharge Equipment Recommendations: none   Barriers to discharge: Ongoing medical treatment    Assessment:     Wallace Pedro is a 49 y.o. male admitted with a medical diagnosis of Diabetic foot ulcer. He presents with the following impairments/functional limitations: impaired sensation, impaired functional mobility, decreased lower extremity function, pain, impaired skin, orthopedic precautions. Pt has undergone debride of plantar surface of foot as well as toe amputation.    Rehab Prognosis: Good; patient would benefit from acute PT services to address these deficits and reach maximum level of function.    Plan:     During this hospitalization, patient to be seen 5 x/week to address the above listed problems via gait training, therapeutic activities, therapeutic exercises    Plan of Care Expires: 08/31/25    Subjective     Chief Complaint: Diabetic foot ulcer   Patient Comments/Goals: agreeable  Pain/Comfort:  Pain Rating 1: 0/10    Social History:  Living Environment: Patient lives with their significant other in a single story home with ramped  Prior Level of Function: Prior to admission, patient was independent and driving and working  Equipment Used at Home: cane, straight, walker, rolling  DME owned (not currently used): none  Assistance Upon Discharge: family    Objective:     Communicated with charge nurse prior to session. Patient found HOB elevated with peripheral IV, wound vac upon PT entry to room.    General Precautions: Standard, fall   Orthopedic Precautions: RLE non weight bearing   Braces: N/A    Respiratory Status: Room air    Exams:  Cognition: Patient is oriented to Person, Place, Time, Situation  RLE ROM: WNL  RLE Strength: WFL  except no resistance applied during MMT  LLE ROM: WNL  LLE Strength: WNL  Sensation:    -       Impaired  diminished distal to knees  Skin Integrity/Edema:     -       Skin integrity: Wound R foot plantar surface, 4th toe    Functional Mobility:  Gait belt applied - Yes  Bed Mobility  Supine to Sit: modified independence  Transfers  Sit to Stand: stand by assistance with rolling walker and with cues for foot placement  Gait  Patient ambulated 30ft with rolling walker and stand by assistance and contact guard assistance. Patient demonstrates steady gait, flexed posture, and decreased randall, maintaining NWB RLE. All lines remained intact throughout ambulation trail.  Balance  Sitting: modified independence  Standing: stand by assistance      Therapeutic Activities and Exercises:   Patient educated on role of acute care PT and PT POC, safety while in hospital including calling nurse for mobility, and call light usage  Patient educated about importance of OOB mobility and remaining up in chair most of the day.      AM-PAC 6 CLICK MOBILITY  Total Score:22    Patient left sitting edge of bed with all lines intact, call button in reach, and family present.    GOALS:   Multidisciplinary Problems       Physical Therapy Goals          Problem: Physical Therapy    Goal Priority Disciplines Outcome Interventions   Physical Therapy Goal     PT, PT/OT Progressing    Description: Short Term Goals to be met by: 25    Patient will increase functional independence with mobility by performin. Supine to sit with Modified Greene  2. Sit to stand transfer with Modified Greene  3. Bed to chair transfer with Modified Greene using Rolling Walker,  NWB R LE  4. Gait  x 100 feet with Modified Greene using Rolling Walker,  NWB R LE.   5. Lower extremity exercise program x30 reps per handout, with assistance as needed      Long Term Goals to be met by: 25     1. Pt with regain full independent  functional mobility to return to prior activities of daily living                        DME Justifications:  No DME recommended requiring DME justifications    History:     Past Medical History:   Diagnosis Date    Diabetes mellitus, type 2     Hyperlipidemia     Hypertension        Past Surgical History:   Procedure Laterality Date    DEBRIDEMENT OF FOOT Right 7/13/2025    Procedure: DEBRIDEMENT, FOOT;  Surgeon: Moises Varela DO;  Location: [facility];  Service: General;  Laterality: Right;    DEBRIDEMENT OF FOOT Right 7/29/2025    Procedure: DEBRIDEMENT, FOOT;  Surgeon: Moises Varela DO;  Location: [facility] OR;  Service: General;  Laterality: Right;    TOE AMPUTATION Right 7/29/2025    Procedure: AMPUTATION, 4th toe;  Surgeon: Moises Varela DO;  Location: [facility] OR;  Service: General;  Laterality: Right;  POSSIBLE MULTIPLE TOE AMPUTATION       Time Tracking:     PT Received On: 07/31/25  PT Start Time: 1415  PT Stop Time: 1438  PT Total Time (min): 23 min     Billable Minutes: Evaluation low complexity 15 and Therapeutic Activity 8    7/31/2025

## 2025-08-01 LAB
ANION GAP SERPL CALCULATED.3IONS-SCNC: 12 MMOL/L (ref 7–16)
BASOPHILS # BLD AUTO: 0.03 K/UL (ref 0–0.2)
BASOPHILS NFR BLD AUTO: 0.5 % (ref 0–1)
BUN SERPL-MCNC: 11 MG/DL (ref 9–21)
BUN/CREAT SERPL: 9 (ref 6–20)
CALCIUM SERPL-MCNC: 9.2 MG/DL (ref 8.4–10.2)
CHLORIDE SERPL-SCNC: 101 MMOL/L (ref 98–107)
CK SERPL-CCNC: 28 U/L (ref 30–200)
CO2 SERPL-SCNC: 30 MMOL/L (ref 22–29)
CREAT SERPL-MCNC: 1.19 MG/DL (ref 0.72–1.25)
CULTURE, BONE: ABNORMAL
EGFR (NO RACE VARIABLE) (RUSH/TITUS): 76 ML/MIN/1.73M2
EOSINOPHIL # BLD AUTO: 0.28 K/UL (ref 0–0.5)
EOSINOPHIL NFR BLD AUTO: 4.9 % (ref 1–4)
ERYTHROCYTE [DISTWIDTH] IN BLOOD BY AUTOMATED COUNT: 11.9 % (ref 11.5–14.5)
ESTROGEN SERPL-MCNC: NORMAL PG/ML
GLUCOSE SERPL-MCNC: 228 MG/DL (ref 70–105)
GLUCOSE SERPL-MCNC: 229 MG/DL (ref 74–100)
GLUCOSE SERPL-MCNC: 292 MG/DL (ref 70–105)
GLUCOSE SERPL-MCNC: 305 MG/DL (ref 70–105)
HCT VFR BLD AUTO: 39.4 % (ref 40–54)
HGB BLD-MCNC: 13 G/DL (ref 13.5–18)
IMM GRANULOCYTES # BLD AUTO: 0.01 K/UL (ref 0–0.04)
IMM GRANULOCYTES NFR BLD: 0.2 % (ref 0–0.4)
INSULIN SERPL-ACNC: NORMAL U[IU]/ML
LAB AP GROSS DESCRIPTION: NORMAL
LAB AP LABORATORY NOTES: NORMAL
LYMPHOCYTES # BLD AUTO: 1.85 K/UL (ref 1–4.8)
LYMPHOCYTES NFR BLD AUTO: 32.3 % (ref 27–41)
MAGNESIUM SERPL-MCNC: 1.9 MG/DL (ref 1.6–2.6)
MCH RBC QN AUTO: 31 PG (ref 27–31)
MCHC RBC AUTO-ENTMCNC: 33 G/DL (ref 32–36)
MCV RBC AUTO: 93.8 FL (ref 80–96)
MONOCYTES # BLD AUTO: 0.68 K/UL (ref 0–0.8)
MONOCYTES NFR BLD AUTO: 11.9 % (ref 2–6)
MPC BLD CALC-MCNC: 10.6 FL (ref 9.4–12.4)
NEUTROPHILS # BLD AUTO: 2.87 K/UL (ref 1.8–7.7)
NEUTROPHILS NFR BLD AUTO: 50.2 % (ref 53–65)
NRBC # BLD AUTO: 0 X10E3/UL
NRBC, AUTO (.00): 0 %
PHOSPHATE SERPL-MCNC: 3.4 MG/DL (ref 2.3–4.7)
PLATELET # BLD AUTO: 250 K/UL (ref 150–400)
PLATELET MORPHOLOGY: NORMAL
POTASSIUM SERPL-SCNC: 3.7 MMOL/L (ref 3.5–5.1)
RBC # BLD AUTO: 4.2 M/UL (ref 4.6–6.2)
RBC MORPH BLD: NORMAL
SODIUM SERPL-SCNC: 139 MMOL/L (ref 136–145)
T3RU NFR SERPL: NORMAL %
WBC # BLD AUTO: 5.72 K/UL (ref 4.5–11)

## 2025-08-01 PROCEDURE — 11000001 HC ACUTE MED/SURG PRIVATE ROOM

## 2025-08-01 PROCEDURE — 63600175 PHARM REV CODE 636 W HCPCS: Performed by: STUDENT IN AN ORGANIZED HEALTH CARE EDUCATION/TRAINING PROGRAM

## 2025-08-01 PROCEDURE — 82962 GLUCOSE BLOOD TEST: CPT

## 2025-08-01 PROCEDURE — 27000207 HC ISOLATION

## 2025-08-01 PROCEDURE — 94761 N-INVAS EAR/PLS OXIMETRY MLT: CPT

## 2025-08-01 PROCEDURE — 83735 ASSAY OF MAGNESIUM: CPT | Performed by: STUDENT IN AN ORGANIZED HEALTH CARE EDUCATION/TRAINING PROGRAM

## 2025-08-01 PROCEDURE — 63600175 PHARM REV CODE 636 W HCPCS

## 2025-08-01 PROCEDURE — 80048 BASIC METABOLIC PNL TOTAL CA: CPT | Performed by: STUDENT IN AN ORGANIZED HEALTH CARE EDUCATION/TRAINING PROGRAM

## 2025-08-01 PROCEDURE — 82550 ASSAY OF CK (CPK): CPT | Performed by: STUDENT IN AN ORGANIZED HEALTH CARE EDUCATION/TRAINING PROGRAM

## 2025-08-01 PROCEDURE — 36415 COLL VENOUS BLD VENIPUNCTURE: CPT | Performed by: STUDENT IN AN ORGANIZED HEALTH CARE EDUCATION/TRAINING PROGRAM

## 2025-08-01 PROCEDURE — 25000003 PHARM REV CODE 250: Performed by: STUDENT IN AN ORGANIZED HEALTH CARE EDUCATION/TRAINING PROGRAM

## 2025-08-01 PROCEDURE — 84100 ASSAY OF PHOSPHORUS: CPT | Performed by: STUDENT IN AN ORGANIZED HEALTH CARE EDUCATION/TRAINING PROGRAM

## 2025-08-01 PROCEDURE — 85025 COMPLETE CBC W/AUTO DIFF WBC: CPT | Performed by: STUDENT IN AN ORGANIZED HEALTH CARE EDUCATION/TRAINING PROGRAM

## 2025-08-01 PROCEDURE — 99233 SBSQ HOSP IP/OBS HIGH 50: CPT | Mod: ,,, | Performed by: STUDENT IN AN ORGANIZED HEALTH CARE EDUCATION/TRAINING PROGRAM

## 2025-08-01 RX ADMIN — ATORVASTATIN CALCIUM 40 MG: 40 TABLET, FILM COATED ORAL at 10:08

## 2025-08-01 RX ADMIN — PIPERACILLIN SODIUM AND TAZOBACTAM SODIUM 4.5 G: 4; .5 INJECTION, POWDER, FOR SOLUTION INTRAVENOUS at 04:08

## 2025-08-01 RX ADMIN — OXYCODONE HYDROCHLORIDE 5 MG: 5 TABLET ORAL at 06:08

## 2025-08-01 RX ADMIN — MUPIROCIN: 20 OINTMENT TOPICAL at 09:08

## 2025-08-01 RX ADMIN — AMLODIPINE BESYLATE 5 MG: 5 TABLET ORAL at 10:08

## 2025-08-01 RX ADMIN — INSULIN ASPART 2 UNITS: 100 INJECTION, SOLUTION INTRAVENOUS; SUBCUTANEOUS at 09:08

## 2025-08-01 RX ADMIN — INSULIN HUMAN 25 UNITS: 100 INJECTION, SUSPENSION SUBCUTANEOUS at 09:08

## 2025-08-01 RX ADMIN — INSULIN HUMAN 20 UNITS: 100 INJECTION, SUSPENSION SUBCUTANEOUS at 10:08

## 2025-08-01 RX ADMIN — PIPERACILLIN SODIUM AND TAZOBACTAM SODIUM 4.5 G: 4; .5 INJECTION, POWDER, FOR SOLUTION INTRAVENOUS at 12:08

## 2025-08-01 RX ADMIN — PIPERACILLIN SODIUM AND TAZOBACTAM SODIUM 4.5 G: 4; .5 INJECTION, POWDER, FOR SOLUTION INTRAVENOUS at 11:08

## 2025-08-01 RX ADMIN — HYDROCHLOROTHIAZIDE 12.5 MG: 12.5 TABLET ORAL at 10:08

## 2025-08-01 RX ADMIN — MORPHINE SULFATE 4 MG: 4 INJECTION, SOLUTION INTRAMUSCULAR; INTRAVENOUS at 04:08

## 2025-08-01 RX ADMIN — LOSARTAN POTASSIUM 50 MG: 50 TABLET, FILM COATED ORAL at 10:08

## 2025-08-01 RX ADMIN — PIPERACILLIN SODIUM AND TAZOBACTAM SODIUM 4.5 G: 4; .5 INJECTION, POWDER, FOR SOLUTION INTRAVENOUS at 10:08

## 2025-08-01 RX ADMIN — ENOXAPARIN SODIUM 40 MG: 40 INJECTION SUBCUTANEOUS at 04:08

## 2025-08-01 RX ADMIN — INSULIN ASPART 3 UNITS: 100 INJECTION, SOLUTION INTRAVENOUS; SUBCUTANEOUS at 12:08

## 2025-08-01 NOTE — ASSESSMENT & PLAN NOTE
Patient's blood pressure range in the last 24 hours was: BP  Min: 128/81  Max: 172/94.The patient's inpatient anti-hypertensive regimen is listed below:  Current Antihypertensives  amLODIPine tablet 5 mg, Daily, Oral  losartan tablet 50 mg, Daily, Oral  hydroCHLOROthiazide tablet 12.5 mg, Daily, Oral    Plan  - BP is controlled, no changes needed to their regimen  - follow

## 2025-08-01 NOTE — SUBJECTIVE & OBJECTIVE
Interval History:  No acute events overnight    Review of Systems  Objective:     Vital Signs (Most Recent):  Temp: 98.6 °F (37 °C) (08/01/25 0745)  Pulse: 72 (08/01/25 1221)  Resp: 20 (08/01/25 1221)  BP: (!) 158/99 (08/01/25 1221)  SpO2: 96 % (08/01/25 1221) Vital Signs (24h Range):  Temp:  [97.5 °F (36.4 °C)-98.6 °F (37 °C)] 98.6 °F (37 °C)  Pulse:  [57-96] 72  Resp:  [18-20] 20  SpO2:  [95 %-99 %] 96 %  BP: (128-172)/(81-99) 158/99     Weight: 102.5 kg (226 lb)  Body mass index is 33.37 kg/m².  No intake or output data in the 24 hours ending 08/01/25 1316        Physical Exam  Vitals reviewed.   Constitutional:       Appearance: Normal appearance.   HENT:      Head: Normocephalic and atraumatic.      Nose: Nose normal.   Eyes:      Conjunctiva/sclera: Conjunctivae normal.   Neck:      Trachea: Trachea normal.   Cardiovascular:      Rate and Rhythm: Normal rate and regular rhythm.      Pulses: Normal pulses.      Heart sounds: Normal heart sounds.   Pulmonary:      Effort: Pulmonary effort is normal.      Breath sounds: Normal breath sounds and air entry.   Abdominal:      Palpations: Abdomen is soft.   Musculoskeletal:      Comments: Wound right foot   Skin:     General: Skin is warm and dry.   Neurological:      General: No focal deficit present.      Mental Status: He is alert and oriented to person, place, and time.      Comments: Grossly normal motor and sensory function without focal deficit appreciated.   Psychiatric:         Mood and Affect: Mood and affect normal.         Behavior: Behavior is cooperative.               Significant Labs: All pertinent labs within the past 24 hours have been reviewed.    Significant Imaging: I have reviewed all pertinent imaging results/findings within the past 24 hours.

## 2025-08-01 NOTE — PLAN OF CARE
Pt's ins is active at this time, patsy. Dr jim. Ss following.        125pm- consult for Daptomycin 800 mg daily x6 weeks.  Weekly CK, weekly CMP. Referral faxed to [facility] care for home iv abx. Referral faxed to [facility] for hh. [facility]  does not accept pt's ins. Ss following.

## 2025-08-01 NOTE — PLAN OF CARE
Problem: Adult Inpatient Plan of Care  Goal: Plan of Care Review  Outcome: Progressing  Goal: Patient-Specific Goal (Individualized)  Outcome: Progressing  Goal: Absence of Hospital-Acquired Illness or Injury  Outcome: Progressing  Goal: Optimal Comfort and Wellbeing  Outcome: Progressing  Goal: Readiness for Transition of Care  Outcome: Progressing     Problem: Sepsis/Septic Shock  Goal: Optimal Coping  Outcome: Progressing  Goal: Absence of Bleeding  Outcome: Progressing  Goal: Blood Glucose Level Within Targeted Range  Outcome: Progressing  Goal: Absence of Infection Signs and Symptoms  Outcome: Progressing  Goal: Optimal Nutrition Intake  Outcome: Progressing     Problem: Diabetes Comorbidity  Goal: Blood Glucose Level Within Targeted Range  Outcome: Progressing     Problem: Wound  Goal: Optimal Coping  Outcome: Progressing  Goal: Optimal Functional Ability  Outcome: Progressing  Goal: Absence of Infection Signs and Symptoms  Outcome: Progressing  Goal: Improved Oral Intake  Outcome: Progressing  Goal: Optimal Pain Control and Function  Outcome: Progressing  Goal: Skin Health and Integrity  Outcome: Progressing  Goal: Optimal Wound Healing  Outcome: Progressing

## 2025-08-01 NOTE — ASSESSMENT & PLAN NOTE
"Patient's FSGs are uncontrolled due to hyperglycemia on current medication regimen.  Last A1c reviewed-   Lab Results   Component Value Date    HGBA1C 11.6 (H) 07/02/2025     Most recent fingerstick glucose reviewed- No results for input(s): "POCTGLUCOSE" in the last 24 hours.  Current correctional scale  Low  Maintain anti-hyperglycemic dose as follows-   Antihyperglycemics (From admission, onward)      Start     Stop Route Frequency Ordered    07/31/25 2100  insulin NPH-insulin regular (70/30) injection 20 Units         -- SubQ 2 times daily 07/31/25 1144    07/29/25 1148  insulin aspart U-100 injection 0-5 Units         -- SubQ Before meals & nightly PRN 07/29/25 1050          Hold Oral hypoglycemics while patient is in the hospital.  7/30 Debridement yesterday  Continue vancomycin and Zosyn  7/31 can discontinue vancomycin, continue Zosyn  8/1 continue Zosyn.  Continue Zosyn while here at discharge we will transitioned to daptomycin 800 mg daily and ciprofloxacin 750 mg b.i.d..  IDSA guideline support deescalating from IV to oral antibiotics for osteomyelitis if sensitivity support efficacy of agent with high oral bioavailability such as fluoroquinolone  "

## 2025-08-01 NOTE — ASSESSMENT & PLAN NOTE
"Patient's FSGs are uncontrolled due to hyperglycemia on current medication regimen.  Last A1c reviewed-   Lab Results   Component Value Date    HGBA1C 11.6 (H) 07/02/2025     Most recent fingerstick glucose reviewed- No results for input(s): "POCTGLUCOSE" in the last 24 hours.  Current correctional scale  Medium  Maintain anti-hyperglycemic dose as follows-   Antihyperglycemics (From admission, onward)      Start     Stop Route Frequency Ordered    07/31/25 2100  insulin NPH-insulin regular (70/30) injection 20 Units         -- SubQ 2 times daily 07/31/25 1144    07/29/25 1148  insulin aspart U-100 injection 0-5 Units         -- SubQ Before meals & nightly PRN 07/29/25 1050          Hold Oral hypoglycemics while patient is in the hospital.  Continue antibiotics, increase insulin  "

## 2025-08-01 NOTE — ASSESSMENT & PLAN NOTE
We will maintain home insulin regimen given patient is self-pay.  Try and optimize his 70 30 regimen  We will add sliding scale.  Increase 70 30  7/31 increase 70 30 again today  8/1 increase insulin again today

## 2025-08-01 NOTE — PROGRESS NOTES
[facility] Hospital Medicine  Progress Note    Patient Name: Wallace Pedro  MRN: [id number]  Patient Class: IP- Inpatient   Admission Date: 7/29/2025  Length of Stay: 3 days  Attending Physician: Chacorta Yip DO  Primary Care Provider: Jamilah, Primary Doctor        Subjective     Principal Problem:Diabetic foot ulcer        HPI:  49-year-old white male with a past medical history of insulin dependent diabetes, diabetic foot wound in the right foot, diabetic neuropathy, diabetic dyslipidemia hypertension obesity presents to the ED after evaluation in surgery clinic today.  Patient had a debridement of a right foot wound a few weeks ago.  Was kept in-house on IV antibiotics for several days and discharged home with doxycycline.  Patient has been taking his antibiotic.  His girlfriend has been helping him with wound care.  Over the last few days the wound has been to drain more in developed a worsening odor.  Presented to surgery Clinic today for follow up.  Sent to the ED for admission and debridement and IV antibiotics this afternoon.  Review of systems otherwise negative    Overview/Hospital Course:  7/30 awaiting cultures.  Increase insulin  7/31 increase insulin.  Patient has Enterococcus Enterobacter E coli and GBS grown in his wound.  He has Enterococcus GBS and preliminary DNR is in the bone we would assume this is E coli given wound cultures.  All should be covered with Zosyn.  8/1 continue Zosyn for now    Interval History:  No acute events overnight    Review of Systems  Objective:     Vital Signs (Most Recent):  Temp: 98.6 °F (37 °C) (08/01/25 0745)  Pulse: 72 (08/01/25 1221)  Resp: 20 (08/01/25 1221)  BP: (!) 158/99 (08/01/25 1221)  SpO2: 96 % (08/01/25 1221) Vital Signs (24h Range):  Temp:  [97.5 °F (36.4 °C)-98.6 °F (37 °C)] 98.6 °F (37 °C)  Pulse:  [57-96] 72  Resp:  [18-20] 20  SpO2:  [95 %-99 %] 96 %  BP: (128-172)/(81-99) 158/99     Weight: 102.5 kg (226 lb)  Body mass index is  "33.37 kg/m².  No intake or output data in the 24 hours ending 08/01/25 1316        Physical Exam  Vitals reviewed.   Constitutional:       Appearance: Normal appearance.   HENT:      Head: Normocephalic and atraumatic.      Nose: Nose normal.   Eyes:      Conjunctiva/sclera: Conjunctivae normal.   Neck:      Trachea: Trachea normal.   Cardiovascular:      Rate and Rhythm: Normal rate and regular rhythm.      Pulses: Normal pulses.      Heart sounds: Normal heart sounds.   Pulmonary:      Effort: Pulmonary effort is normal.      Breath sounds: Normal breath sounds and air entry.   Abdominal:      Palpations: Abdomen is soft.   Musculoskeletal:      Comments: Wound right foot   Skin:     General: Skin is warm and dry.   Neurological:      General: No focal deficit present.      Mental Status: He is alert and oriented to person, place, and time.      Comments: Grossly normal motor and sensory function without focal deficit appreciated.   Psychiatric:         Mood and Affect: Mood and affect normal.         Behavior: Behavior is cooperative.               Significant Labs: All pertinent labs within the past 24 hours have been reviewed.    Significant Imaging: I have reviewed all pertinent imaging results/findings within the past 24 hours.      Assessment & Plan  Diabetic foot ulcer  Patient's FSGs are uncontrolled due to hyperglycemia on current medication regimen.  Last A1c reviewed-   Lab Results   Component Value Date    HGBA1C 11.6 (H) 07/02/2025     Most recent fingerstick glucose reviewed- No results for input(s): "POCTGLUCOSE" in the last 24 hours.  Current correctional scale  Low  Maintain anti-hyperglycemic dose as follows-   Antihyperglycemics (From admission, onward)      Start     Stop Route Frequency Ordered    07/31/25 2100  insulin NPH-insulin regular (70/30) injection 20 Units         -- SubQ 2 times daily 07/31/25 1144    07/29/25 1148  insulin aspart U-100 injection 0-5 Units         -- SubQ Before " meals & nightly PRN 07/29/25 1050          Hold Oral hypoglycemics while patient is in the hospital.  7/30 Debridement yesterday  Continue vancomycin and Zosyn  7/31 can discontinue vancomycin, continue Zosyn  8/1 continue Zosyn.  Continue Zosyn while here at discharge we will transitioned to daptomycin 800 mg daily and ciprofloxacin 750 mg b.i.d..  IDSA guideline support deescalating from IV to oral antibiotics for osteomyelitis if sensitivity support efficacy of agent with high oral bioavailability such as fluoroquinolone  Essential hypertension  Patient's blood pressure range in the last 24 hours was: BP  Min: 128/81  Max: 172/94.The patient's inpatient anti-hypertensive regimen is listed below:  Current Antihypertensives  amLODIPine tablet 5 mg, Daily, Oral  losartan tablet 50 mg, Daily, Oral  hydroCHLOROthiazide tablet 12.5 mg, Daily, Oral    Plan  - BP is controlled, no changes needed to their regimen  - follow  Cellulitis of multiple sites of lower extremity  IV antibiotics surgical debridement  Zosyn  Type 2 diabetes mellitus with hyperglycemia, without long-term current use of insulin  We will maintain home insulin regimen given patient is self-pay.  Try and optimize his 70 30 regimen  We will add sliding scale.  Increase 70 30  7/31 increase 70 30 again today  8/1 increase insulin again today  Other hyperlipidemia  Statin    Foot ulcer with necrosis of muscle, right  Debridement today  Appreciate surgery  Diabetic peripheral vascular disease  Glycemic control statin  Improve glycemic control  Diabetic nephropathy  Glycemic control continue Arb  Continue ARB  Continue Arb  Continue Arb  Obesity (BMI 30-39.9)  Body mass index is 33.37 kg/m². Morbid obesity complicates all aspects of disease management from diagnostic modalities to treatment. Weight loss encouraged and health benefits explained to patient.       DM type 2 with diabetic dyslipidemia  Patient's FSGs are uncontrolled due to hyperglycemia on  "current medication regimen.  Last A1c reviewed-   Lab Results   Component Value Date    HGBA1C 11.6 (H) 07/02/2025     Most recent fingerstick glucose reviewed- No results for input(s): "POCTGLUCOSE" in the last 24 hours.  Current correctional scale  Low  Maintain anti-hyperglycemic dose as follows-   Antihyperglycemics (From admission, onward)      Start     Stop Route Frequency Ordered    07/31/25 2100  insulin NPH-insulin regular (70/30) injection 20 Units         -- SubQ 2 times daily 07/31/25 1144    07/29/25 1148  insulin aspart U-100 injection 0-5 Units         -- SubQ Before meals & nightly PRN 07/29/25 1050          Hold Oral hypoglycemics while patient is in the hospital.  Diabetic foot infection  Patient's FSGs are uncontrolled due to hyperglycemia on current medication regimen.  Last A1c reviewed-   Lab Results   Component Value Date    HGBA1C 11.6 (H) 07/02/2025     Most recent fingerstick glucose reviewed- No results for input(s): "POCTGLUCOSE" in the last 24 hours.  Current correctional scale  Medium  Maintain anti-hyperglycemic dose as follows-   Antihyperglycemics (From admission, onward)      Start     Stop Route Frequency Ordered    07/31/25 2100  insulin NPH-insulin regular (70/30) injection 20 Units         -- SubQ 2 times daily 07/31/25 1144    07/29/25 1148  insulin aspart U-100 injection 0-5 Units         -- SubQ Before meals & nightly PRN 07/29/25 1050          Hold Oral hypoglycemics while patient is in the hospital.  Continue antibiotics, increase insulin  Tobacco dependency  Dangers of cigarette smoking were reviewed with patient in detail. Patient was not counseled Nicotine replacement options were discussed. Nicotine replacement was discussed- not prescribed  VTE Risk Mitigation (From admission, onward)           Ordered     enoxaparin injection 40 mg  Daily         07/29/25 1050     IP VTE HIGH RISK PATIENT  Once         07/29/25 1050     Place sequential compression device  Until " discontinued         07/29/25 1050                    Discharge Planning   NIDHI: 8/7/2025     Code Status: Full Code   Medical Readiness for Discharge Date:   Discharge Plan A: Home          51 minutes spent on face to face patient interaction, discussion with family, ancillary staff, and/or other physicians as well as review of pertinent labs, images, and notes.                 Chacorta Yip DO  Department of Hospital Medicine   [facility]

## 2025-08-01 NOTE — PLAN OF CARE
Problem: Adult Inpatient Plan of Care  Goal: Plan of Care Review  Outcome: Progressing  Goal: Patient-Specific Goal (Individualized)  Outcome: Progressing  Goal: Absence of Hospital-Acquired Illness or Injury  Outcome: Progressing  Goal: Optimal Comfort and Wellbeing  Outcome: Progressing     Problem: Sepsis/Septic Shock  Goal: Optimal Coping  Outcome: Progressing  Goal: Absence of Bleeding  Outcome: Progressing  Goal: Blood Glucose Level Within Targeted Range  Outcome: Progressing  Goal: Absence of Infection Signs and Symptoms  Outcome: Progressing

## 2025-08-01 NOTE — PROGRESS NOTES
[facility]  General Surgery  Progress Note    Subjective:     Interval History:  Patient is awake and alert this morning on exam.  Wound culture positive for Enterobacter E coli and GBS.  Medicine is managing changes in IV antibiotics.  PICC line ordered for home IV antibiotics.  Case management assisting with discharge planning.  Patient has no complaints today.  Dressing change today per nursing staff.  We will continue with daily dressing changes.  Wound to be cleaned with Vashe and repacked with wet to dry wash dressing.    Post-Op Info:  Procedure(s) (LRB):  DEBRIDEMENT, FOOT (Right)  AMPUTATION, 4th toe (Right)   3 Days Post-Op      Medications:  Continuous Infusions:  Scheduled Meds:   amLODIPine  5 mg Oral Daily    atorvastatin  40 mg Oral Daily    enoxparin  40 mg Subcutaneous Daily    hydroCHLOROthiazide  12.5 mg Oral Daily    insulin NPH-insulin regular (70/30)  20 Units Subcutaneous BID    losartan  50 mg Oral Daily    mupirocin   Nasal BID    piperacillin-tazobactam (Zosyn) IV (PEDS and ADULTS) (extended infusion is not appropriate)  4.5 g Intravenous Q8H     PRN Meds:  Current Facility-Administered Medications:     acetaminophen, 650 mg, Oral, Q8H PRN    dextrose 50%, 12.5 g, Intravenous, PRN    dextrose 50%, 25 g, Intravenous, PRN    glucagon (human recombinant), 1 mg, Intramuscular, PRN    glucose, 16 g, Oral, PRN    glucose, 24 g, Oral, PRN    insulin aspart U-100, 0-5 Units, Subcutaneous, QID (AC + HS) PRN    morphine, 4 mg, Intravenous, Q4H PRN    naloxone, 0.02 mg, Intravenous, PRN    ondansetron, 4 mg, Intravenous, Q8H PRN    oxyCODONE, 5 mg, Oral, Q6H PRN    polyethylene glycol, 17 g, Oral, TID PRN    sodium chloride 0.9%, 10 mL, Intravenous, Q12H PRN     Objective:     Vital Signs (Most Recent):  Temp: 98.6 °F (37 °C) (08/01/25 0745)  Pulse: 96 (08/01/25 0745)  Resp: 20 (08/01/25 0745)  BP: (!) 158/86 (08/01/25 0745)  SpO2: 96 % (08/01/25 0745) Vital Signs (24h  Range):  Temp:  [97.5 °F (36.4 °C)-98.6 °F (37 °C)] 98.6 °F (37 °C)  Pulse:  [57-96] 96  Resp:  [18-20] 20  SpO2:  [95 %-99 %] 96 %  BP: (128-172)/(81-99) 158/86     No intake or output data in the 24 hours ending 08/01/25 1215    Physical Exam  Vitals reviewed.   HENT:      Head: Normocephalic.      Nose: Nose normal.   Eyes:      Extraocular Movements: Extraocular movements intact.   Cardiovascular:      Rate and Rhythm: Normal rate.   Pulmonary:      Effort: Pulmonary effort is normal.   Abdominal:      General: Bowel sounds are normal.      Palpations: Abdomen is soft.   Musculoskeletal:         General: Normal range of motion.      Cervical back: Normal range of motion.        Feet:    Skin:     General: Skin is warm and dry.      Capillary Refill: Capillary refill takes less than 2 seconds.   Neurological:      General: No focal deficit present.      Mental Status: He is alert.   Psychiatric:         Mood and Affect: Mood normal.         Significant Labs:  Recent Lab Results         08/01/25  0750   08/01/25  0404   07/31/25  2047   07/31/25  1631        Anion Gap   12           Baso #   0.03           Basophil %   0.5           BUN   11           BUN/CREAT RATIO   9           Calcium   9.2           Chloride   101           CO2   30           Creatinine   1.19           eGFR   76  Comment: Estimated GFR calculated using the CKD-EPI creatinine (2021) equation.           Eos #   0.28           Eos %   4.9           Glucose   229           Hematocrit   39.4           Hemoglobin   13.0           Immature Grans (Abs)   0.01           Immature Granulocytes   0.2           Lymph #   1.85           Lymph %   32.3           Magnesium    1.9           MCH   31.0           MCHC   33.0           MCV   93.8           Mono #   0.68           Mono %   11.9           MPV   10.6           Neutrophils, Abs   2.87           Neutrophils Relative   50.2           nRBC   0.0           NUCLEATED RBC ABSOLUTE   0.00            Phosphorus Level   3.4           PLATELET MORPHOLOGY   Normal           Platelet Count   250           POC Glucose 228     301   344       Potassium   3.7           RBC   4.20           RBC Morphology   Normal           RDW   11.9           Sodium   139           WBC   5.72                 All pertinent labs from the last 24 hours have been reviewed.    Significant Diagnostics:  I have reviewed all pertinent imaging results/findings within the past 24 hours.    Assessment/Plan:     Active Diagnoses:    Diagnosis Date Noted POA    PRINCIPAL PROBLEM:  Diabetic foot ulcer [E11.621, L97.509] 07/15/2025 Yes     Chronic    Tobacco dependency [F17.200] 07/30/2025 Yes    Obesity (BMI 30-39.9) [E66.9] 07/29/2025 Yes    DM type 2 with diabetic dyslipidemia [E11.69, E78.5] 07/29/2025 Yes    Diabetic foot infection [E11.628, L08.9] 07/29/2025 Yes    Diabetic peripheral vascular disease [E11.51] 07/15/2025 Yes    Diabetic nephropathy [E11.21] 07/15/2025 Yes    Foot ulcer with necrosis of muscle, right [L97.513] 07/13/2025 Yes    Cellulitis of multiple sites of lower extremity [L03.119] 05/04/2024 Yes    Type 2 diabetes mellitus with hyperglycemia, without long-term current use of insulin [E11.65] 05/04/2024 Yes    Other hyperlipidemia [E78.49] 05/04/2024 Yes    Essential hypertension [I10] 12/28/2022 Yes      Problems Resolved During this Admission:         Feli Sandoval, GERRY  General Surgery  [facility]   74.8

## 2025-08-02 PROBLEM — M86.271 SUBACUTE OSTEOMYELITIS OF RIGHT FOOT: Status: ACTIVE | Noted: 2025-08-02

## 2025-08-02 LAB
BACTERIA SPEC ANAEROBE CULT: ABNORMAL
GLUCOSE SERPL-MCNC: 256 MG/DL (ref 70–105)
GLUCOSE SERPL-MCNC: 327 MG/DL (ref 70–105)
GLUCOSE SERPL-MCNC: 332 MG/DL (ref 70–105)
GLUCOSE SERPL-MCNC: 359 MG/DL (ref 70–105)

## 2025-08-02 PROCEDURE — 27000207 HC ISOLATION

## 2025-08-02 PROCEDURE — 94761 N-INVAS EAR/PLS OXIMETRY MLT: CPT

## 2025-08-02 PROCEDURE — 99232 SBSQ HOSP IP/OBS MODERATE 35: CPT | Mod: ,,, | Performed by: STUDENT IN AN ORGANIZED HEALTH CARE EDUCATION/TRAINING PROGRAM

## 2025-08-02 PROCEDURE — 96372 THER/PROPH/DIAG INJ SC/IM: CPT

## 2025-08-02 PROCEDURE — 25000003 PHARM REV CODE 250: Performed by: STUDENT IN AN ORGANIZED HEALTH CARE EDUCATION/TRAINING PROGRAM

## 2025-08-02 PROCEDURE — 97110 THERAPEUTIC EXERCISES: CPT

## 2025-08-02 PROCEDURE — 63600175 PHARM REV CODE 636 W HCPCS: Performed by: STUDENT IN AN ORGANIZED HEALTH CARE EDUCATION/TRAINING PROGRAM

## 2025-08-02 PROCEDURE — 97116 GAIT TRAINING THERAPY: CPT

## 2025-08-02 PROCEDURE — 11000001 HC ACUTE MED/SURG PRIVATE ROOM

## 2025-08-02 PROCEDURE — 82962 GLUCOSE BLOOD TEST: CPT

## 2025-08-02 PROCEDURE — 63600175 PHARM REV CODE 636 W HCPCS

## 2025-08-02 RX ADMIN — PIPERACILLIN SODIUM AND TAZOBACTAM SODIUM 4.5 G: 4; .5 INJECTION, POWDER, FOR SOLUTION INTRAVENOUS at 09:08

## 2025-08-02 RX ADMIN — INSULIN ASPART 3 UNITS: 100 INJECTION, SOLUTION INTRAVENOUS; SUBCUTANEOUS at 08:08

## 2025-08-02 RX ADMIN — PIPERACILLIN SODIUM AND TAZOBACTAM SODIUM 4.5 G: 4; .5 INJECTION, POWDER, FOR SOLUTION INTRAVENOUS at 03:08

## 2025-08-02 RX ADMIN — AMLODIPINE BESYLATE 5 MG: 5 TABLET ORAL at 09:08

## 2025-08-02 RX ADMIN — MUPIROCIN: 20 OINTMENT TOPICAL at 09:08

## 2025-08-02 RX ADMIN — ONDANSETRON 4 MG: 2 INJECTION INTRAMUSCULAR; INTRAVENOUS at 06:08

## 2025-08-02 RX ADMIN — HYDROCHLOROTHIAZIDE 12.5 MG: 12.5 TABLET ORAL at 09:08

## 2025-08-02 RX ADMIN — INSULIN HUMAN 25 UNITS: 100 INJECTION, SUSPENSION SUBCUTANEOUS at 09:08

## 2025-08-02 RX ADMIN — INSULIN HUMAN 32 UNITS: 100 INJECTION, SUSPENSION SUBCUTANEOUS at 08:08

## 2025-08-02 RX ADMIN — OXYCODONE HYDROCHLORIDE 5 MG: 5 TABLET ORAL at 06:08

## 2025-08-02 RX ADMIN — MUPIROCIN: 20 OINTMENT TOPICAL at 08:08

## 2025-08-02 RX ADMIN — LOSARTAN POTASSIUM 50 MG: 50 TABLET, FILM COATED ORAL at 09:08

## 2025-08-02 RX ADMIN — ATORVASTATIN CALCIUM 40 MG: 40 TABLET, FILM COATED ORAL at 09:08

## 2025-08-02 RX ADMIN — MORPHINE SULFATE 4 MG: 4 INJECTION, SOLUTION INTRAMUSCULAR; INTRAVENOUS at 08:08

## 2025-08-02 RX ADMIN — ENOXAPARIN SODIUM 40 MG: 40 INJECTION SUBCUTANEOUS at 04:08

## 2025-08-02 RX ADMIN — INSULIN ASPART 4 UNITS: 100 INJECTION, SOLUTION INTRAVENOUS; SUBCUTANEOUS at 04:08

## 2025-08-02 RX ADMIN — PIPERACILLIN SODIUM AND TAZOBACTAM SODIUM 4.5 G: 4; .5 INJECTION, POWDER, FOR SOLUTION INTRAVENOUS at 11:08

## 2025-08-02 NOTE — SUBJECTIVE & OBJECTIVE
Interval History:  Stable, no acute events overnight.  Pain controlled with pain medication    Medications:  Continuous Infusions:  Scheduled Meds:   amLODIPine  5 mg Oral Daily    atorvastatin  40 mg Oral Daily    enoxparin  40 mg Subcutaneous Daily    hydroCHLOROthiazide  12.5 mg Oral Daily    insulin NPH-insulin regular (70/30)  32 Units Subcutaneous BID    losartan  50 mg Oral Daily    mupirocin   Nasal BID    piperacillin-tazobactam (Zosyn) IV (PEDS and ADULTS) (extended infusion is not appropriate)  4.5 g Intravenous Q8H     PRN Meds:  Current Facility-Administered Medications:     acetaminophen, 650 mg, Oral, Q8H PRN    dextrose 50%, 12.5 g, Intravenous, PRN    dextrose 50%, 25 g, Intravenous, PRN    glucagon (human recombinant), 1 mg, Intramuscular, PRN    glucose, 16 g, Oral, PRN    glucose, 24 g, Oral, PRN    insulin aspart U-100, 0-5 Units, Subcutaneous, QID (AC + HS) PRN    morphine, 4 mg, Intravenous, Q4H PRN    naloxone, 0.02 mg, Intravenous, PRN    ondansetron, 4 mg, Intravenous, Q8H PRN    oxyCODONE, 5 mg, Oral, Q6H PRN    polyethylene glycol, 17 g, Oral, TID PRN    sodium chloride 0.9%, 10 mL, Intravenous, Q12H PRN     Review of patient's allergies indicates:  No Known Allergies  Objective:     Vital Signs (Most Recent):  Temp: 97.9 °F (36.6 °C) (08/02/25 1121)  Pulse: 91 (08/02/25 1121)  Resp: 16 (08/02/25 0859)  BP: (!) 143/91 (08/02/25 1121)  SpO2: (!) 92 % (08/02/25 1121) Vital Signs (24h Range):  Temp:  [97.4 °F (36.3 °C)-98.3 °F (36.8 °C)] 97.9 °F (36.6 °C)  Pulse:  [63-91] 91  Resp:  [16-20] 16  SpO2:  [91 %-99 %] 92 %  BP: (143-170)/(74-99) 143/91     Weight: 102.5 kg (226 lb)  Body mass index is 33.37 kg/m².    Intake/Output - Last 3 Shifts         07/31 0700  08/01 0659 08/01 0700  08/02 0659 08/02 0700  08/03 0659    Urine (mL/kg/hr)  600 (0.2)     Total Output  600     Net  -600                     Physical Exam  Constitutional:       General: He is not in acute distress.     Appearance:  Normal appearance.   HENT:      Head: Normocephalic.   Cardiovascular:      Rate and Rhythm: Normal rate.   Pulmonary:      Effort: Pulmonary effort is normal. No respiratory distress.   Abdominal:      General: There is no distension.      Tenderness: There is no abdominal tenderness.   Musculoskeletal:         General: Normal range of motion.        Feet:    Feet:      Comments: Right foot 4th toe amputation site healing well with adequate granulation tissue; no necrosis or purulent drainage; moderate tenderness to palpation  Skin:     General: Skin is warm.      Coloration: Skin is not jaundiced.   Neurological:      General: No focal deficit present.      Mental Status: He is alert and oriented to person, place, and time.      Cranial Nerves: No cranial nerve deficit.          Significant Labs:  I have reviewed all pertinent lab results within the past 24 hours.  CBC:   Recent Labs   Lab 08/01/25  0404   WBC 5.72   RBC 4.20*   HGB 13.0*   HCT 39.4*      MCV 93.8   MCH 31.0   MCHC 33.0     BMP:   Recent Labs   Lab 08/01/25  0404   *      K 3.7      CO2 30*   BUN 11   CREATININE 1.19   CALCIUM 9.2   MG 1.9       Significant Diagnostics:  I have reviewed all pertinent imaging results/findings within the past 24 hours.

## 2025-08-02 NOTE — ASSESSMENT & PLAN NOTE
"Patient's FSGs are uncontrolled due to hyperglycemia on current medication regimen.  Last A1c reviewed-   Lab Results   Component Value Date    HGBA1C 11.6 (H) 07/02/2025     Most recent fingerstick glucose reviewed- No results for input(s): "POCTGLUCOSE" in the last 24 hours.  Current correctional scale  Low  Maintain anti-hyperglycemic dose as follows-   Antihyperglycemics (From admission, onward)      Start     Stop Route Frequency Ordered    08/02/25 2100  insulin NPH-insulin regular (70/30) injection 32 Units         -- SubQ 2 times daily 08/02/25 0912    07/29/25 1148  insulin aspart U-100 injection 0-5 Units         -- SubQ Before meals & nightly PRN 07/29/25 1050          Hold Oral hypoglycemics while patient is in the hospital.  "

## 2025-08-02 NOTE — PROGRESS NOTES
[facility] Hospital Medicine  Progress Note    Patient Name: Wallace Pedro  MRN: [id number]  Patient Class: IP- Inpatient   Admission Date: 7/29/2025  Length of Stay: 4 days  Attending Physician: Chacorta Yip DO  Primary Care Provider: Jamilah, Primary Doctor        Subjective     Principal Problem:Diabetic foot ulcer        HPI:  49-year-old white male with a past medical history of insulin dependent diabetes, diabetic foot wound in the right foot, diabetic neuropathy, diabetic dyslipidemia hypertension obesity presents to the ED after evaluation in surgery clinic today.  Patient had a debridement of a right foot wound a few weeks ago.  Was kept in-house on IV antibiotics for several days and discharged home with doxycycline.  Patient has been taking his antibiotic.  His girlfriend has been helping him with wound care.  Over the last few days the wound has been to drain more in developed a worsening odor.  Presented to surgery Clinic today for follow up.  Sent to the ED for admission and debridement and IV antibiotics this afternoon.  Review of systems otherwise negative    Overview/Hospital Course:  7/30 awaiting cultures.  Increase insulin  7/31 increase insulin.  Patient has Enterococcus Enterobacter E coli and GBS grown in his wound.  He has Enterococcus GBS and preliminary DNR is in the bone we would assume this is E coli given wound cultures.  All should be covered with Zosyn.  8/1 continue Zosyn for now  8/2 we have a antibiotics regimen in place.  Needs a PICC line.  This will not be done until Monday.  Discharge thereafter    Interval History:  No acute events overnight    Review of Systems  Objective:     Vital Signs (Most Recent):  Temp: 98.3 °F (36.8 °C) (08/02/25 0726)  Pulse: 66 (08/02/25 0726)  Resp: 16 (08/02/25 0859)  BP: (!) 144/74 (08/02/25 0726)  SpO2: (!) 91 % (08/02/25 0900) Vital Signs (24h Range):  Temp:  [97.4 °F (36.3 °C)-98.3 °F (36.8 °C)] 98.3 °F (36.8 °C)  Pulse:   "[63-72] 66  Resp:  [16-20] 16  SpO2:  [91 %-99 %] 91 %  BP: (144-170)/(74-99) 144/74     Weight: 102.5 kg (226 lb)  Body mass index is 33.37 kg/m².    Intake/Output Summary (Last 24 hours) at 8/2/2025 1023  Last data filed at 8/1/2025 2341  Gross per 24 hour   Intake --   Output 600 ml   Net -600 ml           Physical Exam  Vitals reviewed.   Constitutional:       Appearance: Normal appearance.   HENT:      Head: Normocephalic and atraumatic.      Nose: Nose normal.   Eyes:      Conjunctiva/sclera: Conjunctivae normal.   Neck:      Trachea: Trachea normal.   Cardiovascular:      Rate and Rhythm: Normal rate and regular rhythm.      Pulses: Normal pulses.      Heart sounds: Normal heart sounds.   Pulmonary:      Effort: Pulmonary effort is normal.      Breath sounds: Normal breath sounds and air entry.   Abdominal:      Palpations: Abdomen is soft.   Musculoskeletal:      Comments: Wound right foot   Skin:     General: Skin is warm and dry.   Neurological:      General: No focal deficit present.      Mental Status: He is alert and oriented to person, place, and time.      Comments: Grossly normal motor and sensory function without focal deficit appreciated.   Psychiatric:         Mood and Affect: Mood and affect normal.         Behavior: Behavior is cooperative.               Significant Labs: All pertinent labs within the past 24 hours have been reviewed.    Significant Imaging: I have reviewed all pertinent imaging results/findings within the past 24 hours.      Assessment & Plan  Diabetic foot ulcer  Patient's FSGs are uncontrolled due to hyperglycemia on current medication regimen.  Last A1c reviewed-   Lab Results   Component Value Date    HGBA1C 11.6 (H) 07/02/2025     Most recent fingerstick glucose reviewed- No results for input(s): "POCTGLUCOSE" in the last 24 hours.  Current correctional scale  Low  Maintain anti-hyperglycemic dose as follows-   Antihyperglycemics (From admission, onward)      Start     " Stop Route Frequency Ordered    08/02/25 2100  insulin NPH-insulin regular (70/30) injection 32 Units         -- SubQ 2 times daily 08/02/25 0912    07/29/25 1148  insulin aspart U-100 injection 0-5 Units         -- SubQ Before meals & nightly PRN 07/29/25 1050          Hold Oral hypoglycemics while patient is in the hospital.  7/30 Debridement yesterday  Continue vancomycin and Zosyn  7/31 can discontinue vancomycin, continue Zosyn  8/1 continue Zosyn.  Continue Zosyn while here at discharge we will transitioned to daptomycin 800 mg daily and ciprofloxacin 750 mg b.i.d..  IDSA guideline support deescalating from IV to oral antibiotics for osteomyelitis if sensitivity support efficacy of agent with high oral bioavailability such as fluoroquinolone  8/2 continue Zosyn for now  Essential hypertension  Patient's blood pressure range in the last 24 hours was: BP  Min: 144/74  Max: 170/96.The patient's inpatient anti-hypertensive regimen is listed below:  Current Antihypertensives  amLODIPine tablet 5 mg, Daily, Oral  losartan tablet 50 mg, Daily, Oral  hydroCHLOROthiazide tablet 12.5 mg, Daily, Oral    Plan  - BP is controlled, no changes needed to their regimen  - follow  BP reasonably controlled  Cellulitis of multiple sites of lower extremity  IV antibiotics surgical debridement  Zosyn  Type 2 diabetes mellitus with hyperglycemia, without long-term current use of insulin  We will maintain home insulin regimen given patient is self-pay.  Try and optimize his 70 30 regimen  We will add sliding scale.  Increase 70 30  7/31 increase 70 30 again today  8/1 increase insulin again today  8/2 increase insulin today  Other hyperlipidemia  Statin    Foot ulcer with necrosis of muscle, right  Debridement today  Appreciate surgery  Diabetic peripheral vascular disease  Glycemic control statin  Improve glycemic control  Diabetic nephropathy  Glycemic control continue Arb  Continue ARB  Continue Arb  Continue Arb  Obesity (BMI  "30-39.9)  Body mass index is 33.37 kg/m². Morbid obesity complicates all aspects of disease management from diagnostic modalities to treatment. Weight loss encouraged and health benefits explained to patient.       DM type 2 with diabetic dyslipidemia  Patient's FSGs are uncontrolled due to hyperglycemia on current medication regimen.  Last A1c reviewed-   Lab Results   Component Value Date    HGBA1C 11.6 (H) 07/02/2025     Most recent fingerstick glucose reviewed- No results for input(s): "POCTGLUCOSE" in the last 24 hours.  Current correctional scale  Low  Maintain anti-hyperglycemic dose as follows-   Antihyperglycemics (From admission, onward)      Start     Stop Route Frequency Ordered    08/02/25 2100  insulin NPH-insulin regular (70/30) injection 32 Units         -- SubQ 2 times daily 08/02/25 0912    07/29/25 1148  insulin aspart U-100 injection 0-5 Units         -- SubQ Before meals & nightly PRN 07/29/25 1050          Hold Oral hypoglycemics while patient is in the hospital.  Diabetic foot infection  Patient's FSGs are uncontrolled due to hyperglycemia on current medication regimen.  Last A1c reviewed-   Lab Results   Component Value Date    HGBA1C 11.6 (H) 07/02/2025     Most recent fingerstick glucose reviewed- No results for input(s): "POCTGLUCOSE" in the last 24 hours.  Current correctional scale  Medium  Maintain anti-hyperglycemic dose as follows-   Antihyperglycemics (From admission, onward)      Start     Stop Route Frequency Ordered    08/02/25 2100  insulin NPH-insulin regular (70/30) injection 32 Units         -- SubQ 2 times daily 08/02/25 0912    07/29/25 1148  insulin aspart U-100 injection 0-5 Units         -- SubQ Before meals & nightly PRN 07/29/25 1050          Hold Oral hypoglycemics while patient is in the hospital.  Continue antibiotics, increase insulin  Tobacco dependency  Dangers of cigarette smoking were reviewed with patient in detail. Patient was not counseled Nicotine " replacement options were discussed. Nicotine replacement was discussed- not prescribed  VTE Risk Mitigation (From admission, onward)           Ordered     enoxaparin injection 40 mg  Daily         07/29/25 1050     IP VTE HIGH RISK PATIENT  Once         07/29/25 1050     Place sequential compression device  Until discontinued         07/29/25 1050                    Discharge Planning   NIDHI: 8/7/2025     Code Status: Full Code   Medical Readiness for Discharge Date:   Discharge Plan A: Home          Increase insulin, continue Zosyn.  Continue BP regimen              Chacorta Yip DO  Department of Hospital Medicine   [facility]

## 2025-08-02 NOTE — ASSESSMENT & PLAN NOTE
Positive osteomyelitis; bone cultures positive for Pseudomonas, strep B, Enterococcus    Wound cultures positive for Enterococcus, Enterobacter, E coli and strep B    Patient on IV Zosyn.  We will need 6 weeks IV antibiotics with a PICC line; hospitalist managing    No further debridements needed; dressing changes per nursing staff.  Patient can follow up in outpatient surgery clinic in 2 weeks after discharge

## 2025-08-02 NOTE — SUBJECTIVE & OBJECTIVE
Interval History:  No acute events overnight    Review of Systems  Objective:     Vital Signs (Most Recent):  Temp: 98.3 °F (36.8 °C) (08/02/25 0726)  Pulse: 66 (08/02/25 0726)  Resp: 16 (08/02/25 0859)  BP: (!) 144/74 (08/02/25 0726)  SpO2: (!) 91 % (08/02/25 0900) Vital Signs (24h Range):  Temp:  [97.4 °F (36.3 °C)-98.3 °F (36.8 °C)] 98.3 °F (36.8 °C)  Pulse:  [63-72] 66  Resp:  [16-20] 16  SpO2:  [91 %-99 %] 91 %  BP: (144-170)/(74-99) 144/74     Weight: 102.5 kg (226 lb)  Body mass index is 33.37 kg/m².    Intake/Output Summary (Last 24 hours) at 8/2/2025 1023  Last data filed at 8/1/2025 2341  Gross per 24 hour   Intake --   Output 600 ml   Net -600 ml           Physical Exam  Vitals reviewed.   Constitutional:       Appearance: Normal appearance.   HENT:      Head: Normocephalic and atraumatic.      Nose: Nose normal.   Eyes:      Conjunctiva/sclera: Conjunctivae normal.   Neck:      Trachea: Trachea normal.   Cardiovascular:      Rate and Rhythm: Normal rate and regular rhythm.      Pulses: Normal pulses.      Heart sounds: Normal heart sounds.   Pulmonary:      Effort: Pulmonary effort is normal.      Breath sounds: Normal breath sounds and air entry.   Abdominal:      Palpations: Abdomen is soft.   Musculoskeletal:      Comments: Wound right foot   Skin:     General: Skin is warm and dry.   Neurological:      General: No focal deficit present.      Mental Status: He is alert and oriented to person, place, and time.      Comments: Grossly normal motor and sensory function without focal deficit appreciated.   Psychiatric:         Mood and Affect: Mood and affect normal.         Behavior: Behavior is cooperative.               Significant Labs: All pertinent labs within the past 24 hours have been reviewed.    Significant Imaging: I have reviewed all pertinent imaging results/findings within the past 24 hours.

## 2025-08-02 NOTE — ASSESSMENT & PLAN NOTE
"Patient's FSGs are uncontrolled due to hyperglycemia on current medication regimen.  Last A1c reviewed-   Lab Results   Component Value Date    HGBA1C 11.6 (H) 07/02/2025     Most recent fingerstick glucose reviewed- No results for input(s): "POCTGLUCOSE" in the last 24 hours.  Current correctional scale  Low  Maintain anti-hyperglycemic dose as follows-   Antihyperglycemics (From admission, onward)      Start     Stop Route Frequency Ordered    08/02/25 2100  insulin NPH-insulin regular (70/30) injection 32 Units         -- SubQ 2 times daily 08/02/25 0912    07/29/25 1148  insulin aspart U-100 injection 0-5 Units         -- SubQ Before meals & nightly PRN 07/29/25 1050          Hold Oral hypoglycemics while patient is in the hospital.  7/30 Debridement yesterday  Continue vancomycin and Zosyn  7/31 can discontinue vancomycin, continue Zosyn  8/1 continue Zosyn.  Continue Zosyn while here at discharge we will transitioned to daptomycin 800 mg daily and ciprofloxacin 750 mg b.i.d..  IDSA guideline support deescalating from IV to oral antibiotics for osteomyelitis if sensitivity support efficacy of agent with high oral bioavailability such as fluoroquinolone  8/2 continue Zosyn for now  "

## 2025-08-02 NOTE — PROGRESS NOTES
[facility]  General Surgery  Progress Note    Subjective:     History of Present Illness:  No notes on file    Post-Op Info:  Procedure(s) (LRB):  DEBRIDEMENT, FOOT (Right)  AMPUTATION, 4th toe (Right)   4 Days Post-Op     Interval History:  Stable, no acute events overnight.  Pain controlled with pain medication    Medications:  Continuous Infusions:  Scheduled Meds:   amLODIPine  5 mg Oral Daily    atorvastatin  40 mg Oral Daily    enoxparin  40 mg Subcutaneous Daily    hydroCHLOROthiazide  12.5 mg Oral Daily    insulin NPH-insulin regular (70/30)  32 Units Subcutaneous BID    losartan  50 mg Oral Daily    mupirocin   Nasal BID    piperacillin-tazobactam (Zosyn) IV (PEDS and ADULTS) (extended infusion is not appropriate)  4.5 g Intravenous Q8H     PRN Meds:  Current Facility-Administered Medications:     acetaminophen, 650 mg, Oral, Q8H PRN    dextrose 50%, 12.5 g, Intravenous, PRN    dextrose 50%, 25 g, Intravenous, PRN    glucagon (human recombinant), 1 mg, Intramuscular, PRN    glucose, 16 g, Oral, PRN    glucose, 24 g, Oral, PRN    insulin aspart U-100, 0-5 Units, Subcutaneous, QID (AC + HS) PRN    morphine, 4 mg, Intravenous, Q4H PRN    naloxone, 0.02 mg, Intravenous, PRN    ondansetron, 4 mg, Intravenous, Q8H PRN    oxyCODONE, 5 mg, Oral, Q6H PRN    polyethylene glycol, 17 g, Oral, TID PRN    sodium chloride 0.9%, 10 mL, Intravenous, Q12H PRN     Review of patient's allergies indicates:  No Known Allergies  Objective:     Vital Signs (Most Recent):  Temp: 97.9 °F (36.6 °C) (08/02/25 1121)  Pulse: 91 (08/02/25 1121)  Resp: 16 (08/02/25 0859)  BP: (!) 143/91 (08/02/25 1121)  SpO2: (!) 92 % (08/02/25 1121) Vital Signs (24h Range):  Temp:  [97.4 °F (36.3 °C)-98.3 °F (36.8 °C)] 97.9 °F (36.6 °C)  Pulse:  [63-91] 91  Resp:  [16-20] 16  SpO2:  [91 %-99 %] 92 %  BP: (143-170)/(74-99) 143/91     Weight: 102.5 kg (226 lb)  Body mass index is 33.37 kg/m².    Intake/Output - Last 3 Shifts          07/31 0700  08/01 0659 08/01 0700  08/02 0659 08/02 0700  08/03 0659    Urine (mL/kg/hr)  600 (0.2)     Total Output  600     Net  -600                     Physical Exam  Constitutional:       General: He is not in acute distress.     Appearance: Normal appearance.   HENT:      Head: Normocephalic.   Cardiovascular:      Rate and Rhythm: Normal rate.   Pulmonary:      Effort: Pulmonary effort is normal. No respiratory distress.   Abdominal:      General: There is no distension.      Tenderness: There is no abdominal tenderness.   Musculoskeletal:         General: Normal range of motion.        Feet:    Feet:      Comments: Right foot 4th toe amputation site healing well with adequate granulation tissue; no necrosis or purulent drainage; moderate tenderness to palpation  Skin:     General: Skin is warm.      Coloration: Skin is not jaundiced.   Neurological:      General: No focal deficit present.      Mental Status: He is alert and oriented to person, place, and time.      Cranial Nerves: No cranial nerve deficit.          Significant Labs:  I have reviewed all pertinent lab results within the past 24 hours.  CBC:   Recent Labs   Lab 08/01/25  0404   WBC 5.72   RBC 4.20*   HGB 13.0*   HCT 39.4*      MCV 93.8   MCH 31.0   MCHC 33.0     BMP:   Recent Labs   Lab 08/01/25  0404   *      K 3.7      CO2 30*   BUN 11   CREATININE 1.19   CALCIUM 9.2   MG 1.9       Significant Diagnostics:  I have reviewed all pertinent imaging results/findings within the past 24 hours.  Assessment/Plan:     * Diabetic foot ulcer  Positive osteomyelitis; bone cultures positive for Pseudomonas, strep B, Enterococcus    Wound cultures positive for Enterococcus, Enterobacter, E coli and strep B    Patient on IV Zosyn.  We will need 6 weeks IV antibiotics with a PICC line; hospitalist managing    No further debridements needed; dressing changes per nursing staff.  Patient can follow up in outpatient surgery clinic in 2  weeks after discharge        Moises Hobson, DO  General Surgery  [facility]

## 2025-08-02 NOTE — ASSESSMENT & PLAN NOTE
We will maintain home insulin regimen given patient is self-pay.  Try and optimize his 70 30 regimen  We will add sliding scale.  Increase 70 30  7/31 increase 70 30 again today  8/1 increase insulin again today  8/2 increase insulin today

## 2025-08-02 NOTE — ASSESSMENT & PLAN NOTE
Patient's blood pressure range in the last 24 hours was: BP  Min: 144/74  Max: 170/96.The patient's inpatient anti-hypertensive regimen is listed below:  Current Antihypertensives  amLODIPine tablet 5 mg, Daily, Oral  losartan tablet 50 mg, Daily, Oral  hydroCHLOROthiazide tablet 12.5 mg, Daily, Oral    Plan  - BP is controlled, no changes needed to their regimen  - follow  BP reasonably controlled

## 2025-08-02 NOTE — PT/OT/SLP PROGRESS
Physical Therapy Treatment    Patient Name:  Wallace Pedro   MRN:  [id number]    Recommendations:     Discharge Recommendations: No Therapy Indicated  Discharge Equipment Recommendations: none  Barriers to discharge: ongoing medical care    Assessment:     Wallace Pedro is a 49 y.o. male admitted with a medical diagnosis of Diabetic foot ulcer.  He presents with the following impairments/functional limitations: impaired sensation, impaired functional mobility, decreased lower extremity function, pain, impaired skin, orthopedic precautions.     Rehab Prognosis: Good; patient would benefit from acute skilled PT services to address these deficits and reach maximum level of function.    Recent Surgery: Procedure(s) (LRB):  DEBRIDEMENT, FOOT (Right)  AMPUTATION, 4th toe (Right) 4 Days Post-Op    Plan:     During this hospitalization, patient to be seen 5 x/week to address the identified rehab impairments via gait training, therapeutic activities, therapeutic exercises and progress toward the following goals:    Plan of Care Expires:  08/31/25    Subjective     Chief Complaint: Diabetic foot ulcer   Patient/Family Comments/goals: agreeable  Pain/Comfort: 5/10 R foot         Objective:     Communicated with RN prior to session.  Patient found HOB elevated with peripheral IV, wound vac upon PT entry to room.     General Precautions: Standard, fall  Orthopedic Precautions: RLE non weight bearing  Braces: N/A  Respiratory Status: Room air     Functional Mobility:  Bed Mobility:     Supine to Sit: modified independence  Transfers:     Sit to Stand:  stand by assistance with rolling walker  Gait: 50ft with RW, CGA with NWB R foot   Balance: Fair in stance      AM-PAC 6 CLICK MOBILITY          Treatment & Education:  Bilateral lower extremity exercise x 20 reps: ankle pumps, short arc quads, heel slides, hip abduction/adduction, straight leg raises, Long arc quads, and Marching with verbal cues for sequencing and safety     Patient  left up in chair with all lines intact, call button in reach, and family present..    GOALS:   Multidisciplinary Problems       Physical Therapy Goals          Problem: Physical Therapy    Goal Priority Disciplines Outcome Interventions   Physical Therapy Goal     PT, PT/OT Progressing    Description: Short Term Goals to be met by: 25    Patient will increase functional independence with mobility by performin. Supine to sit with Modified [place]  2. Sit to stand transfer with Modified [place]  3. Bed to chair transfer with Modified [place] using Rolling Walker,  NWB R LE  4. Gait  x 100 feet with Modified [place] using Rolling Walker,  NWB R LE.   5. Lower extremity exercise program x30 reps per handout, with assistance as needed      Long Term Goals to be met by: 25     1. Pt with regain full independent functional mobility to return to prior activities of daily living                        DME Justifications:  No DME recommended requiring DME justifications    Time Tracking:     PT Received On: 25  PT Start Time: 1133     PT Stop Time: 1202  PT Total Time (min): 29 min     Billable Minutes: Gait Training 10 and Therapeutic Exercise 15    Treatment Type: Evaluation  PT/PTA: PT           2025

## 2025-08-02 NOTE — ASSESSMENT & PLAN NOTE
"Patient's FSGs are uncontrolled due to hyperglycemia on current medication regimen.  Last A1c reviewed-   Lab Results   Component Value Date    HGBA1C 11.6 (H) 07/02/2025     Most recent fingerstick glucose reviewed- No results for input(s): "POCTGLUCOSE" in the last 24 hours.  Current correctional scale  Medium  Maintain anti-hyperglycemic dose as follows-   Antihyperglycemics (From admission, onward)      Start     Stop Route Frequency Ordered    08/02/25 2100  insulin NPH-insulin regular (70/30) injection 32 Units         -- SubQ 2 times daily 08/02/25 0912    07/29/25 1148  insulin aspart U-100 injection 0-5 Units         -- SubQ Before meals & nightly PRN 07/29/25 1050          Hold Oral hypoglycemics while patient is in the hospital.  Continue antibiotics, increase insulin  "

## 2025-08-03 LAB
GLUCOSE SERPL-MCNC: 207 MG/DL (ref 70–105)
GLUCOSE SERPL-MCNC: 240 MG/DL (ref 70–105)
GLUCOSE SERPL-MCNC: 296 MG/DL (ref 70–105)
GLUCOSE SERPL-MCNC: 317 MG/DL (ref 70–105)

## 2025-08-03 PROCEDURE — 99232 SBSQ HOSP IP/OBS MODERATE 35: CPT | Mod: ,,, | Performed by: STUDENT IN AN ORGANIZED HEALTH CARE EDUCATION/TRAINING PROGRAM

## 2025-08-03 PROCEDURE — 11000001 HC ACUTE MED/SURG PRIVATE ROOM

## 2025-08-03 PROCEDURE — 96372 THER/PROPH/DIAG INJ SC/IM: CPT

## 2025-08-03 PROCEDURE — 94761 N-INVAS EAR/PLS OXIMETRY MLT: CPT

## 2025-08-03 PROCEDURE — 27000207 HC ISOLATION

## 2025-08-03 PROCEDURE — 63600175 PHARM REV CODE 636 W HCPCS: Performed by: STUDENT IN AN ORGANIZED HEALTH CARE EDUCATION/TRAINING PROGRAM

## 2025-08-03 PROCEDURE — 82962 GLUCOSE BLOOD TEST: CPT

## 2025-08-03 PROCEDURE — 25000003 PHARM REV CODE 250: Performed by: STUDENT IN AN ORGANIZED HEALTH CARE EDUCATION/TRAINING PROGRAM

## 2025-08-03 RX ORDER — INSULIN GLARGINE 100 [IU]/ML
20 INJECTION, SOLUTION SUBCUTANEOUS DAILY
Status: DISCONTINUED | OUTPATIENT
Start: 2025-08-03 | End: 2025-08-04

## 2025-08-03 RX ORDER — IBUPROFEN 200 MG
24 TABLET ORAL
Status: DISCONTINUED | OUTPATIENT
Start: 2025-08-03 | End: 2025-08-04 | Stop reason: HOSPADM

## 2025-08-03 RX ORDER — GLUCAGON 1 MG
1 KIT INJECTION
Status: DISCONTINUED | OUTPATIENT
Start: 2025-08-03 | End: 2025-08-04 | Stop reason: HOSPADM

## 2025-08-03 RX ORDER — IBUPROFEN 200 MG
16 TABLET ORAL
Status: DISCONTINUED | OUTPATIENT
Start: 2025-08-03 | End: 2025-08-04 | Stop reason: HOSPADM

## 2025-08-03 RX ORDER — INSULIN ASPART 100 [IU]/ML
0-10 INJECTION, SOLUTION INTRAVENOUS; SUBCUTANEOUS
Status: DISCONTINUED | OUTPATIENT
Start: 2025-08-03 | End: 2025-08-04 | Stop reason: HOSPADM

## 2025-08-03 RX ADMIN — PIPERACILLIN SODIUM AND TAZOBACTAM SODIUM 4.5 G: 4; .5 INJECTION, POWDER, FOR SOLUTION INTRAVENOUS at 11:08

## 2025-08-03 RX ADMIN — LOSARTAN POTASSIUM 50 MG: 50 TABLET, FILM COATED ORAL at 08:08

## 2025-08-03 RX ADMIN — INSULIN ASPART 4 UNITS: 100 INJECTION, SOLUTION INTRAVENOUS; SUBCUTANEOUS at 05:08

## 2025-08-03 RX ADMIN — OXYCODONE HYDROCHLORIDE 5 MG: 5 TABLET ORAL at 08:08

## 2025-08-03 RX ADMIN — OXYCODONE HYDROCHLORIDE 5 MG: 5 TABLET ORAL at 04:08

## 2025-08-03 RX ADMIN — PIPERACILLIN SODIUM AND TAZOBACTAM SODIUM 4.5 G: 4; .5 INJECTION, POWDER, FOR SOLUTION INTRAVENOUS at 04:08

## 2025-08-03 RX ADMIN — INSULIN ASPART 4 UNITS: 100 INJECTION, SOLUTION INTRAVENOUS; SUBCUTANEOUS at 08:08

## 2025-08-03 RX ADMIN — INSULIN GLARGINE 20 UNITS: 100 INJECTION, SOLUTION SUBCUTANEOUS at 08:08

## 2025-08-03 RX ADMIN — INSULIN ASPART 6 UNITS: 100 INJECTION, SOLUTION INTRAVENOUS; SUBCUTANEOUS at 12:08

## 2025-08-03 RX ADMIN — PIPERACILLIN SODIUM AND TAZOBACTAM SODIUM 4.5 G: 4; .5 INJECTION, POWDER, FOR SOLUTION INTRAVENOUS at 08:08

## 2025-08-03 RX ADMIN — HYDROCHLOROTHIAZIDE 12.5 MG: 12.5 TABLET ORAL at 08:08

## 2025-08-03 RX ADMIN — INSULIN ASPART 4 UNITS: 100 INJECTION, SOLUTION INTRAVENOUS; SUBCUTANEOUS at 09:08

## 2025-08-03 RX ADMIN — ATORVASTATIN CALCIUM 40 MG: 40 TABLET, FILM COATED ORAL at 08:08

## 2025-08-03 RX ADMIN — AMLODIPINE BESYLATE 5 MG: 5 TABLET ORAL at 08:08

## 2025-08-03 RX ADMIN — MORPHINE SULFATE 4 MG: 4 INJECTION, SOLUTION INTRAMUSCULAR; INTRAVENOUS at 11:08

## 2025-08-03 RX ADMIN — OXYCODONE HYDROCHLORIDE 5 MG: 5 TABLET ORAL at 09:08

## 2025-08-03 RX ADMIN — ENOXAPARIN SODIUM 40 MG: 40 INJECTION SUBCUTANEOUS at 04:08

## 2025-08-03 NOTE — ASSESSMENT & PLAN NOTE
Patient's blood pressure range in the last 24 hours was: BP  Min: 121/83  Max: 144/74.The patient's inpatient anti-hypertensive regimen is listed below:  Current Antihypertensives  amLODIPine tablet 5 mg, Daily, Oral  losartan tablet 50 mg, Daily, Oral  hydroCHLOROthiazide tablet 12.5 mg, Daily, Oral    Plan  - BP is controlled, no changes needed to their regimen  - follow  BP reasonably controlled  8/3 bp ok

## 2025-08-03 NOTE — ASSESSMENT & PLAN NOTE
"Patient's FSGs are uncontrolled due to hyperglycemia on current medication regimen.  Last A1c reviewed-   Lab Results   Component Value Date    HGBA1C 11.6 (H) 07/02/2025     Most recent fingerstick glucose reviewed- No results for input(s): "POCTGLUCOSE" in the last 24 hours.  Current correctional scale  Low  Maintain anti-hyperglycemic dose as follows-   Antihyperglycemics (From admission, onward)      Start     Stop Route Frequency Ordered    08/02/25 2100  insulin NPH-insulin regular (70/30) injection 32 Units         -- SubQ 2 times daily 08/02/25 0912    07/29/25 1148  insulin aspart U-100 injection 0-5 Units         -- SubQ Before meals & nightly PRN 07/29/25 1050          Hold Oral hypoglycemics while patient is in the hospital.  7/30 Debridement yesterday  Continue vancomycin and Zosyn  7/31 can discontinue vancomycin, continue Zosyn  8/1 continue Zosyn.  Continue Zosyn while here at discharge we will transitioned to daptomycin 800 mg daily and ciprofloxacin 750 mg b.i.d..  IDSA guideline support deescalating from IV to oral antibiotics for osteomyelitis if sensitivity support efficacy of agent with high oral bioavailability such as fluoroquinolone  8/2 continue Zosyn for now  8/3 continue zosyn. Home with oral cipro and dapto  "

## 2025-08-03 NOTE — ASSESSMENT & PLAN NOTE
We will maintain home insulin regimen given patient is self-pay.  Try and optimize his 70 30 regimen  We will add sliding scale.  Increase 70 30  7/31 increase 70 30 again today  8/1 increase insulin again today  8/2 increase insulin today  8/3 continues to have poor control, now that patient is insured will transition to basal bolus regimen and stop 70/30. Hopefully this will provide better glucose control

## 2025-08-03 NOTE — PROGRESS NOTES
[facility] Medicine  Progress Note    Patient Name: Wallace Pedro  MRN: [id number]  Patient Class: IP- Inpatient   Admission Date: 7/29/2025  Length of Stay: 5 days  Attending Physician: Chacorta Yip DO  Primary Care Provider: Jamilah, Primary Doctor        Subjective     Principal Problem:Diabetic foot ulcer        HPI:  49-year-old white male with a past medical history of insulin dependent diabetes, diabetic foot wound in the right foot, diabetic neuropathy, diabetic dyslipidemia hypertension obesity presents to the ED after evaluation in surgery clinic today.  Patient had a debridement of a right foot wound a few weeks ago.  Was kept in-house on IV antibiotics for several days and discharged home with doxycycline.  Patient has been taking his antibiotic.  His girlfriend has been helping him with wound care.  Over the last few days the wound has been to drain more in developed a worsening odor.  Presented to surgery Clinic today for follow up.  Sent to the ED for admission and debridement and IV antibiotics this afternoon.  Review of systems otherwise negative    Overview/Hospital Course:  7/30 awaiting cultures.  Increase insulin  7/31 increase insulin.  Patient has Enterococcus Enterobacter E coli and GBS grown in his wound.  He has Enterococcus GBS and preliminary DNR is in the bone we would assume this is E coli given wound cultures.  All should be covered with Zosyn.  8/1 continue Zosyn for now  8/2 we have a antibiotics regimen in place.  Needs a PICC line.  This will not be done until Monday.  Discharge thereafter  8/3 PICC tomorrow home with cipro and dapto    Interval History:  No acute events overnight    Review of Systems  Objective:     Vital Signs (Most Recent):  Temp: 97.9 °F (36.6 °C) (08/03/25 0453)  Pulse: 71 (08/03/25 0453)  Resp: 18 (08/03/25 0453)  BP: 125/86 (08/03/25 0453)  SpO2: 98 % (08/03/25 0453) Vital Signs (24h Range):  Temp:  [97.6 °F (36.4 °C)-98.3 °F  "(36.8 °C)] 97.9 °F (36.6 °C)  Pulse:  [62-91] 71  Resp:  [16-18] 18  SpO2:  [91 %-99 %] 98 %  BP: (121-144)/(74-91) 125/86     Weight: 102.5 kg (226 lb)  Body mass index is 33.37 kg/m².  No intake or output data in the 24 hours ending 08/03/25 0545          Physical Exam  Vitals reviewed.   Constitutional:       Appearance: Normal appearance.   HENT:      Head: Normocephalic and atraumatic.      Nose: Nose normal.   Eyes:      Conjunctiva/sclera: Conjunctivae normal.   Neck:      Trachea: Trachea normal.   Cardiovascular:      Rate and Rhythm: Normal rate and regular rhythm.      Pulses: Normal pulses.      Heart sounds: Normal heart sounds.   Pulmonary:      Effort: Pulmonary effort is normal.      Breath sounds: Normal breath sounds and air entry.   Abdominal:      Palpations: Abdomen is soft.   Musculoskeletal:      Comments: Wound right foot   Skin:     General: Skin is warm and dry.   Neurological:      General: No focal deficit present.      Mental Status: He is alert and oriented to person, place, and time.      Comments: Grossly normal motor and sensory function without focal deficit appreciated.   Psychiatric:         Mood and Affect: Mood and affect normal.         Behavior: Behavior is cooperative.               Significant Labs: All pertinent labs within the past 24 hours have been reviewed.    Significant Imaging: I have reviewed all pertinent imaging results/findings within the past 24 hours.      Assessment & Plan  Diabetic foot ulcer  Patient's FSGs are uncontrolled due to hyperglycemia on current medication regimen.  Last A1c reviewed-   Lab Results   Component Value Date    HGBA1C 11.6 (H) 07/02/2025     Most recent fingerstick glucose reviewed- No results for input(s): "POCTGLUCOSE" in the last 24 hours.  Current correctional scale  Low  Maintain anti-hyperglycemic dose as follows-   Antihyperglycemics (From admission, onward)      Start     Stop Route Frequency Ordered    08/02/25 2100  insulin " NPH-insulin regular (70/30) injection 32 Units         -- SubQ 2 times daily 08/02/25 0912    07/29/25 1148  insulin aspart U-100 injection 0-5 Units         -- SubQ Before meals & nightly PRN 07/29/25 1050          Hold Oral hypoglycemics while patient is in the hospital.  7/30 Debridement yesterday  Continue vancomycin and Zosyn  7/31 can discontinue vancomycin, continue Zosyn  8/1 continue Zosyn.  Continue Zosyn while here at discharge we will transitioned to daptomycin 800 mg daily and ciprofloxacin 750 mg b.i.d..  IDSA guideline support deescalating from IV to oral antibiotics for osteomyelitis if sensitivity support efficacy of agent with high oral bioavailability such as fluoroquinolone  8/2 continue Zosyn for now  8/3 continue zosyn. Home with oral cipro and dapto  Essential hypertension  Patient's blood pressure range in the last 24 hours was: BP  Min: 121/83  Max: 144/74.The patient's inpatient anti-hypertensive regimen is listed below:  Current Antihypertensives  amLODIPine tablet 5 mg, Daily, Oral  losartan tablet 50 mg, Daily, Oral  hydroCHLOROthiazide tablet 12.5 mg, Daily, Oral    Plan  - BP is controlled, no changes needed to their regimen  - follow  BP reasonably controlled  8/3 bp ok  Cellulitis of multiple sites of lower extremity  IV antibiotics surgical debridement  Zosyn  Type 2 diabetes mellitus with hyperglycemia, without long-term current use of insulin  We will maintain home insulin regimen given patient is self-pay.  Try and optimize his 70 30 regimen  We will add sliding scale.  Increase 70 30  7/31 increase 70 30 again today  8/1 increase insulin again today  8/2 increase insulin today  8/3 continues to have poor control, now that patient is insured will transition to basal bolus regimen and stop 70/30. Hopefully this will provide better glucose control  Other hyperlipidemia  Statin  continue  Foot ulcer with necrosis of muscle, right  Debridement today  Appreciate surgery  Diabetic  "peripheral vascular disease  Glycemic control statin  Improve glycemic control  Escalate insulin  Diabetic nephropathy  Glycemic control continue Arb  Continue ARB  Continue Arb  Continue Arb  Obesity (BMI 30-39.9)  Body mass index is 33.37 kg/m². Morbid obesity complicates all aspects of disease management from diagnostic modalities to treatment. Weight loss encouraged and health benefits explained to patient.       DM type 2 with diabetic dyslipidemia  Patient's FSGs are uncontrolled due to hyperglycemia on current medication regimen.  Last A1c reviewed-   Lab Results   Component Value Date    HGBA1C 11.6 (H) 07/02/2025     Most recent fingerstick glucose reviewed- No results for input(s): "POCTGLUCOSE" in the last 24 hours.  Current correctional scale  Low  Maintain anti-hyperglycemic dose as follows-   Antihyperglycemics (From admission, onward)      Start     Stop Route Frequency Ordered    08/02/25 2100  insulin NPH-insulin regular (70/30) injection 32 Units         -- SubQ 2 times daily 08/02/25 0912    07/29/25 1148  insulin aspart U-100 injection 0-5 Units         -- SubQ Before meals & nightly PRN 07/29/25 1050          Hold Oral hypoglycemics while patient is in the hospital.  Escalate insulin  Diabetic foot infection  Patient's FSGs are uncontrolled due to hyperglycemia on current medication regimen.  Last A1c reviewed-   Lab Results   Component Value Date    HGBA1C 11.6 (H) 07/02/2025     Most recent fingerstick glucose reviewed- No results for input(s): "POCTGLUCOSE" in the last 24 hours.  Current correctional scale  Medium  Maintain anti-hyperglycemic dose as follows-   Antihyperglycemics (From admission, onward)      Start     Stop Route Frequency Ordered    08/02/25 2100  insulin NPH-insulin regular (70/30) injection 32 Units         -- SubQ 2 times daily 08/02/25 0912    07/29/25 1148  insulin aspart U-100 injection 0-5 Units         -- SubQ Before meals & nightly PRN 07/29/25 1050          Hold " Oral hypoglycemics while patient is in the hospital.  Continue antibiotics, increase insulin  Tobacco dependency  Dangers of cigarette smoking were reviewed with patient in detail. Patient was not counseled Nicotine replacement options were discussed. Nicotine replacement was discussed- not prescribed  Subacute osteomyelitis of right foot    Dapto, cipro at discharge  Continue zosyn  VTE Risk Mitigation (From admission, onward)           Ordered     enoxaparin injection 40 mg  Daily         07/29/25 1050     IP VTE HIGH RISK PATIENT  Once         07/29/25 1050     Place sequential compression device  Until discontinued         07/29/25 1050                    Discharge Planning   NIDHI: 8/7/2025     Code Status: Full Code   Medical Readiness for Discharge Date:   Discharge Plan A: Home            51 minutes spent on face to face patient interaction, discussion with family, ancillary staff, and/or other physicians as well as review of pertinent labs, images, and notes.              Chacorta Yip DO  Department of Hospital Medicine   [facility]

## 2025-08-03 NOTE — ASSESSMENT & PLAN NOTE
"Patient's FSGs are uncontrolled due to hyperglycemia on current medication regimen.  Last A1c reviewed-   Lab Results   Component Value Date    HGBA1C 11.6 (H) 07/02/2025     Most recent fingerstick glucose reviewed- No results for input(s): "POCTGLUCOSE" in the last 24 hours.  Current correctional scale  Low  Maintain anti-hyperglycemic dose as follows-   Antihyperglycemics (From admission, onward)      Start     Stop Route Frequency Ordered    08/02/25 2100  insulin NPH-insulin regular (70/30) injection 32 Units         -- SubQ 2 times daily 08/02/25 0912    07/29/25 1148  insulin aspart U-100 injection 0-5 Units         -- SubQ Before meals & nightly PRN 07/29/25 1050          Hold Oral hypoglycemics while patient is in the hospital.  Escalate insulin  "

## 2025-08-03 NOTE — SUBJECTIVE & OBJECTIVE
Interval History:  No acute events overnight    Review of Systems  Objective:     Vital Signs (Most Recent):  Temp: 97.9 °F (36.6 °C) (08/03/25 0453)  Pulse: 71 (08/03/25 0453)  Resp: 18 (08/03/25 0453)  BP: 125/86 (08/03/25 0453)  SpO2: 98 % (08/03/25 0453) Vital Signs (24h Range):  Temp:  [97.6 °F (36.4 °C)-98.3 °F (36.8 °C)] 97.9 °F (36.6 °C)  Pulse:  [62-91] 71  Resp:  [16-18] 18  SpO2:  [91 %-99 %] 98 %  BP: (121-144)/(74-91) 125/86     Weight: 102.5 kg (226 lb)  Body mass index is 33.37 kg/m².  No intake or output data in the 24 hours ending 08/03/25 0545          Physical Exam  Vitals reviewed.   Constitutional:       Appearance: Normal appearance.   HENT:      Head: Normocephalic and atraumatic.      Nose: Nose normal.   Eyes:      Conjunctiva/sclera: Conjunctivae normal.   Neck:      Trachea: Trachea normal.   Cardiovascular:      Rate and Rhythm: Normal rate and regular rhythm.      Pulses: Normal pulses.      Heart sounds: Normal heart sounds.   Pulmonary:      Effort: Pulmonary effort is normal.      Breath sounds: Normal breath sounds and air entry.   Abdominal:      Palpations: Abdomen is soft.   Musculoskeletal:      Comments: Wound right foot   Skin:     General: Skin is warm and dry.   Neurological:      General: No focal deficit present.      Mental Status: He is alert and oriented to person, place, and time.      Comments: Grossly normal motor and sensory function without focal deficit appreciated.   Psychiatric:         Mood and Affect: Mood and affect normal.         Behavior: Behavior is cooperative.               Significant Labs: All pertinent labs within the past 24 hours have been reviewed.    Significant Imaging: I have reviewed all pertinent imaging results/findings within the past 24 hours.

## 2025-08-04 ENCOUNTER — TELEPHONE (OUTPATIENT)
Dept: SURGERY | Facility: CLINIC | Age: 50
End: 2025-08-04
Payer: COMMERCIAL

## 2025-08-04 VITALS
TEMPERATURE: 98 F | WEIGHT: 226 LBS | SYSTOLIC BLOOD PRESSURE: 139 MMHG | HEART RATE: 75 BPM | RESPIRATION RATE: 18 BRPM | HEIGHT: 69 IN | DIASTOLIC BLOOD PRESSURE: 94 MMHG | OXYGEN SATURATION: 99 % | BODY MASS INDEX: 33.47 KG/M2

## 2025-08-04 LAB
BACTERIA BLD CULT: NORMAL
BACTERIA BLD CULT: NORMAL
GLUCOSE SERPL-MCNC: 239 MG/DL (ref 70–105)
GLUCOSE SERPL-MCNC: 259 MG/DL (ref 70–105)

## 2025-08-04 PROCEDURE — 25000003 PHARM REV CODE 250: Performed by: STUDENT IN AN ORGANIZED HEALTH CARE EDUCATION/TRAINING PROGRAM

## 2025-08-04 PROCEDURE — C1751 CATH, INF, PER/CENT/MIDLINE: HCPCS

## 2025-08-04 PROCEDURE — 96372 THER/PROPH/DIAG INJ SC/IM: CPT

## 2025-08-04 PROCEDURE — 63600175 PHARM REV CODE 636 W HCPCS

## 2025-08-04 PROCEDURE — 63600175 PHARM REV CODE 636 W HCPCS: Performed by: STUDENT IN AN ORGANIZED HEALTH CARE EDUCATION/TRAINING PROGRAM

## 2025-08-04 PROCEDURE — 99239 HOSP IP/OBS DSCHRG MGMT >30: CPT | Mod: ,,, | Performed by: STUDENT IN AN ORGANIZED HEALTH CARE EDUCATION/TRAINING PROGRAM

## 2025-08-04 PROCEDURE — 82962 GLUCOSE BLOOD TEST: CPT

## 2025-08-04 PROCEDURE — 97116 GAIT TRAINING THERAPY: CPT | Mod: CQ

## 2025-08-04 RX ORDER — INSULIN GLARGINE 100 [IU]/ML
30 INJECTION, SOLUTION SUBCUTANEOUS DAILY
Status: DISCONTINUED | OUTPATIENT
Start: 2025-08-05 | End: 2025-08-04 | Stop reason: HOSPADM

## 2025-08-04 RX ORDER — INSULIN GLARGINE-YFGN 100 [IU]/ML
25 INJECTION, SOLUTION SUBCUTANEOUS NIGHTLY
Qty: 7.5 ML | Refills: 11 | Status: SHIPPED | OUTPATIENT
Start: 2025-08-04 | End: 2026-08-04

## 2025-08-04 RX ORDER — IBUPROFEN 200 MG
1 TABLET ORAL DAILY
Qty: 28 PATCH | Refills: 0 | Status: SHIPPED | OUTPATIENT
Start: 2025-08-04

## 2025-08-04 RX ORDER — HYDRALAZINE HYDROCHLORIDE 20 MG/ML
10 INJECTION INTRAMUSCULAR; INTRAVENOUS EVERY 6 HOURS PRN
Status: DISCONTINUED | OUTPATIENT
Start: 2025-08-04 | End: 2025-08-04 | Stop reason: HOSPADM

## 2025-08-04 RX ORDER — HYDROCODONE BITARTRATE AND ACETAMINOPHEN 7.5; 325 MG/1; MG/1
1 TABLET ORAL EVERY 6 HOURS PRN
Qty: 21 TABLET | Refills: 0 | Status: SHIPPED | OUTPATIENT
Start: 2025-08-04

## 2025-08-04 RX ORDER — CIPROFLOXACIN 250 MG/1
750 TABLET, FILM COATED ORAL EVERY 12 HOURS
Qty: 180 TABLET | Refills: 0 | Status: SHIPPED | OUTPATIENT
Start: 2025-08-04 | End: 2025-09-15

## 2025-08-04 RX ADMIN — AMLODIPINE BESYLATE 5 MG: 5 TABLET ORAL at 09:08

## 2025-08-04 RX ADMIN — DAPTOMYCIN 820 MG: 500 INJECTION, POWDER, LYOPHILIZED, FOR SOLUTION INTRAVENOUS at 03:08

## 2025-08-04 RX ADMIN — HYDROCHLOROTHIAZIDE 12.5 MG: 12.5 TABLET ORAL at 10:08

## 2025-08-04 RX ADMIN — LOSARTAN POTASSIUM 50 MG: 50 TABLET, FILM COATED ORAL at 09:08

## 2025-08-04 RX ADMIN — ATORVASTATIN CALCIUM 40 MG: 40 TABLET, FILM COATED ORAL at 09:08

## 2025-08-04 RX ADMIN — OXYCODONE HYDROCHLORIDE 5 MG: 5 TABLET ORAL at 10:08

## 2025-08-04 RX ADMIN — HYDRALAZINE HYDROCHLORIDE 10 MG: 20 INJECTION INTRAMUSCULAR; INTRAVENOUS at 02:08

## 2025-08-04 RX ADMIN — ENOXAPARIN SODIUM 40 MG: 40 INJECTION SUBCUTANEOUS at 03:08

## 2025-08-04 RX ADMIN — OXYCODONE HYDROCHLORIDE 5 MG: 5 TABLET ORAL at 03:08

## 2025-08-04 RX ADMIN — INSULIN ASPART 6 UNITS: 100 INJECTION, SOLUTION INTRAVENOUS; SUBCUTANEOUS at 06:08

## 2025-08-04 RX ADMIN — INSULIN GLARGINE 30 UNITS: 100 INJECTION, SOLUTION SUBCUTANEOUS at 10:08

## 2025-08-04 NOTE — SUBJECTIVE & OBJECTIVE
Interval History:  No acute events overnight    Review of Systems  Objective:     Vital Signs (Most Recent):  Temp: 97.8 °F (36.6 °C) (08/04/25 0738)  Pulse: 71 (08/04/25 0738)  Resp: 18 (08/04/25 0400)  BP: (!) 144/97 (08/04/25 0738)  SpO2: 95 % (08/04/25 0738) Vital Signs (24h Range):  Temp:  [97.5 °F (36.4 °C)-98.6 °F (37 °C)] 97.8 °F (36.6 °C)  Pulse:  [69-89] 71  Resp:  [17-18] 18  SpO2:  [94 %-98 %] 95 %  BP: (125-187)/() 144/97     Weight: 102.5 kg (226 lb)  Body mass index is 33.37 kg/m².    Intake/Output Summary (Last 24 hours) at 8/4/2025 0950  Last data filed at 8/4/2025 0346  Gross per 24 hour   Intake 298.27 ml   Output 1600 ml   Net -1301.73 ml             Physical Exam  Vitals reviewed.   Constitutional:       Appearance: Normal appearance.   HENT:      Head: Normocephalic and atraumatic.      Nose: Nose normal.   Eyes:      Conjunctiva/sclera: Conjunctivae normal.   Neck:      Trachea: Trachea normal.   Cardiovascular:      Rate and Rhythm: Normal rate and regular rhythm.      Pulses: Normal pulses.      Heart sounds: Normal heart sounds.   Pulmonary:      Effort: Pulmonary effort is normal.      Breath sounds: Normal breath sounds and air entry.   Abdominal:      Palpations: Abdomen is soft.   Musculoskeletal:      Comments: Wound right foot   Skin:     General: Skin is warm and dry.   Neurological:      General: No focal deficit present.      Mental Status: He is alert and oriented to person, place, and time.      Comments: Grossly normal motor and sensory function without focal deficit appreciated.   Psychiatric:         Mood and Affect: Mood and affect normal.         Behavior: Behavior is cooperative.               Significant Labs: All pertinent labs within the past 24 hours have been reviewed.    Significant Imaging: I have reviewed all pertinent imaging results/findings within the past 24 hours.

## 2025-08-04 NOTE — PLAN OF CARE
[facility]  Discharge Final Note    Primary Care Provider: Jamilah, Primary Doctor    Expected Discharge Date: 8/4/2025    Final Discharge Note (most recent)       Final Note - 08/04/25 1448          Final Note    Assessment Type Final Discharge Note     Anticipated Discharge Disposition Home-Health Care Svc        Post-Acute Status    Post-Acute Authorization Home Health;IV Infusion     Home Health Status Set-up Complete/Auth obtained     IV Infusion Status Set-up Complete/Auth obtained     Patient choice form signed by patient/caregiver List with quality metrics by geographic area provided;List from CMS Compare;List from System Post-Acute Care     Discharge Delays None known at this time                   Pt [facility] home with [facility]. [facility] with [facility] informed. [facility] to deliver iv meds to pt's home tomorrow. Pt aware.  Important Message from Medicare              Follow-up providers       Moises Varela DO   Specialty: General Surgery, Surgery    [address] [facility]  [place] [id number]   Phone: [phone]       Next Steps: Follow up in 2 week(s)    Brandie Acosta MD   Specialty: [facility]    [address] [facility] [address]   Phone: [phone]       Next Steps: Follow up in 1 week(s)    Instructions: Please follow up on August 11 at 9:20 am              After-discharge care                Dialysis/Infusion       [facility]   Service: Home Infusion and Injection    [address] [facility] [address]   Phone: [phone]                 Home Medical Care       [facility]   Service: Home Health Services    [address] [place] [id number]   Phone: [phone]

## 2025-08-04 NOTE — ASSESSMENT & PLAN NOTE
"Patient's FSGs are uncontrolled due to hyperglycemia on current medication regimen.  Last A1c reviewed-   Lab Results   Component Value Date    HGBA1C 11.6 (H) 07/02/2025     Most recent fingerstick glucose reviewed- No results for input(s): "POCTGLUCOSE" in the last 24 hours.  Current correctional scale  Low  Maintain anti-hyperglycemic dose as follows-   Antihyperglycemics (From admission, onward)      Start     Stop Route Frequency Ordered    08/03/25 0900  insulin glargine U-100 (Lantus) injection 20 Units         -- SubQ Daily 08/03/25 0547    08/03/25 0647  insulin aspart U-100 injection 0-10 Units         -- SubQ Before meals & nightly PRN 08/03/25 0547          Hold Oral hypoglycemics while patient is in the hospital.  7/30 Debridement yesterday  Continue vancomycin and Zosyn  7/31 can discontinue vancomycin, continue Zosyn  8/1 continue Zosyn.  Continue Zosyn while here at discharge we will transitioned to daptomycin 800 mg daily and ciprofloxacin 750 mg b.i.d..  IDSA guideline support deescalating from IV to oral antibiotics for osteomyelitis if sensitivity support efficacy of agent with high oral bioavailability such as fluoroquinolone  8/2 continue Zosyn for now  8/3 continue zosyn. Home with oral cipro and dapto  8/4 awaiting insurance approval for IV antibiotics  "

## 2025-08-04 NOTE — PROGRESS NOTES
[facility] Medicine  Progress Note    Patient Name: Wallace Pedro  MRN: [id number]  Patient Class: IP- Inpatient   Admission Date: 7/29/2025  Length of Stay: 6 days  Attending Physician: Chacorta Yip DO  Primary Care Provider: Jamilah, Primary Doctor        Subjective     Principal Problem:Diabetic foot ulcer        HPI:  49-year-old white male with a past medical history of insulin dependent diabetes, diabetic foot wound in the right foot, diabetic neuropathy, diabetic dyslipidemia hypertension obesity presents to the ED after evaluation in surgery clinic today.  Patient had a debridement of a right foot wound a few weeks ago.  Was kept in-house on IV antibiotics for several days and discharged home with doxycycline.  Patient has been taking his antibiotic.  His girlfriend has been helping him with wound care.  Over the last few days the wound has been to drain more in developed a worsening odor.  Presented to surgery Clinic today for follow up.  Sent to the ED for admission and debridement and IV antibiotics this afternoon.  Review of systems otherwise negative    Overview/Hospital Course:  7/30 awaiting cultures.  Increase insulin  7/31 increase insulin.  Patient has Enterococcus Enterobacter E coli and GBS grown in his wound.  He has Enterococcus GBS and preliminary DNR is in the bone we would assume this is E coli given wound cultures.  All should be covered with Zosyn.  8/1 continue Zosyn for now  8/2 we have a antibiotics regimen in place.  Needs a PICC line.  This will not be done until Monday.  Discharge thereafter  8/3 PICC tomorrow home with cipro and dapto  8/4 PICC line placed awaiting approval for antibiotics    Interval History:  No acute events overnight    Review of Systems  Objective:     Vital Signs (Most Recent):  Temp: 97.8 °F (36.6 °C) (08/04/25 0738)  Pulse: 71 (08/04/25 0738)  Resp: 18 (08/04/25 0400)  BP: (!) 144/97 (08/04/25 0738)  SpO2: 95 % (08/04/25 0738)  "Vital Signs (24h Range):  Temp:  [97.5 °F (36.4 °C)-98.6 °F (37 °C)] 97.8 °F (36.6 °C)  Pulse:  [69-89] 71  Resp:  [17-18] 18  SpO2:  [94 %-98 %] 95 %  BP: (125-187)/() 144/97     Weight: 102.5 kg (226 lb)  Body mass index is 33.37 kg/m².    Intake/Output Summary (Last 24 hours) at 8/4/2025 0950  Last data filed at 8/4/2025 0346  Gross per 24 hour   Intake 298.27 ml   Output 1600 ml   Net -1301.73 ml             Physical Exam  Vitals reviewed.   Constitutional:       Appearance: Normal appearance.   HENT:      Head: Normocephalic and atraumatic.      Nose: Nose normal.   Eyes:      Conjunctiva/sclera: Conjunctivae normal.   Neck:      Trachea: Trachea normal.   Cardiovascular:      Rate and Rhythm: Normal rate and regular rhythm.      Pulses: Normal pulses.      Heart sounds: Normal heart sounds.   Pulmonary:      Effort: Pulmonary effort is normal.      Breath sounds: Normal breath sounds and air entry.   Abdominal:      Palpations: Abdomen is soft.   Musculoskeletal:      Comments: Wound right foot   Skin:     General: Skin is warm and dry.   Neurological:      General: No focal deficit present.      Mental Status: He is alert and oriented to person, place, and time.      Comments: Grossly normal motor and sensory function without focal deficit appreciated.   Psychiatric:         Mood and Affect: Mood and affect normal.         Behavior: Behavior is cooperative.               Significant Labs: All pertinent labs within the past 24 hours have been reviewed.    Significant Imaging: I have reviewed all pertinent imaging results/findings within the past 24 hours.      Assessment & Plan  Diabetic foot ulcer  Patient's FSGs are uncontrolled due to hyperglycemia on current medication regimen.  Last A1c reviewed-   Lab Results   Component Value Date    HGBA1C 11.6 (H) 07/02/2025     Most recent fingerstick glucose reviewed- No results for input(s): "POCTGLUCOSE" in the last 24 hours.  Current correctional scale  " Low  Maintain anti-hyperglycemic dose as follows-   Antihyperglycemics (From admission, onward)      Start     Stop Route Frequency Ordered    08/03/25 0900  insulin glargine U-100 (Lantus) injection 20 Units         -- SubQ Daily 08/03/25 0547    08/03/25 0647  insulin aspart U-100 injection 0-10 Units         -- SubQ Before meals & nightly PRN 08/03/25 0547          Hold Oral hypoglycemics while patient is in the hospital.  7/30 Debridement yesterday  Continue vancomycin and Zosyn  7/31 can discontinue vancomycin, continue Zosyn  8/1 continue Zosyn.  Continue Zosyn while here at discharge we will transitioned to daptomycin 800 mg daily and ciprofloxacin 750 mg b.i.d..  IDSA guideline support deescalating from IV to oral antibiotics for osteomyelitis if sensitivity support efficacy of agent with high oral bioavailability such as fluoroquinolone  8/2 continue Zosyn for now  8/3 continue zosyn. Home with oral cipro and dapto  8/4 awaiting insurance approval for IV antibiotics  Essential hypertension  Patient's blood pressure range in the last 24 hours was: BP  Min: 125/74  Max: 187/112.The patient's inpatient anti-hypertensive regimen is listed below:  Current Antihypertensives  amLODIPine tablet 5 mg, Daily, Oral  losartan tablet 50 mg, Daily, Oral  hydroCHLOROthiazide tablet 12.5 mg, Daily, Oral  hydrALAZINE injection 10 mg, Every 6 hours PRN, Intravenous    Plan  - BP is controlled, no changes needed to their regimen  - follow  BP reasonably controlled  8/3 bp ok  8/4 blood pressure looks good  Cellulitis of multiple sites of lower extremity  IV antibiotics surgical debridement  Zosyn  Type 2 diabetes mellitus with hyperglycemia, without long-term current use of insulin  We will maintain home insulin regimen given patient is self-pay.  Try and optimize his 70 30 regimen  We will add sliding scale.  Increase 70 30  7/31 increase 70 30 again today  8/1 increase insulin again today  8/2 increase insulin today  8/3  "continues to have poor control, now that patient is insured will transition to basal bolus regimen and stop 70/30. Hopefully this will provide better glucose control  8/4 increase insulin  Other hyperlipidemia  Statin  continue  Foot ulcer with necrosis of muscle, right  Debridement today  Appreciate surgery  Diabetic peripheral vascular disease  Glycemic control statin  Improve glycemic control  Escalate insulin  Diabetic nephropathy  Glycemic control continue Arb  Continue ARB  Continue Arb  Continue Arb  Obesity (BMI 30-39.9)  Body mass index is 33.37 kg/m². Morbid obesity complicates all aspects of disease management from diagnostic modalities to treatment. Weight loss encouraged and health benefits explained to patient.       DM type 2 with diabetic dyslipidemia  Patient's FSGs are uncontrolled due to hyperglycemia on current medication regimen.  Last A1c reviewed-   Lab Results   Component Value Date    HGBA1C 11.6 (H) 07/02/2025     Most recent fingerstick glucose reviewed- No results for input(s): "POCTGLUCOSE" in the last 24 hours.  Current correctional scale  Low  Maintain anti-hyperglycemic dose as follows-   Antihyperglycemics (From admission, onward)      Start     Stop Route Frequency Ordered    08/03/25 0900  insulin glargine U-100 (Lantus) injection 20 Units         -- SubQ Daily 08/03/25 0547    08/03/25 0647  insulin aspart U-100 injection 0-10 Units         -- SubQ Before meals & nightly PRN 08/03/25 0547          Hold Oral hypoglycemics while patient is in the hospital.  Escalate insulin  Diabetic foot infection  Patient's FSGs are uncontrolled due to hyperglycemia on current medication regimen.  Last A1c reviewed-   Lab Results   Component Value Date    HGBA1C 11.6 (H) 07/02/2025     Most recent fingerstick glucose reviewed- No results for input(s): "POCTGLUCOSE" in the last 24 hours.  Current correctional scale  Medium  Maintain anti-hyperglycemic dose as follows-   Antihyperglycemics (From " admission, onward)      Start     Stop Route Frequency Ordered    08/03/25 0900  insulin glargine U-100 (Lantus) injection 20 Units         -- SubQ Daily 08/03/25 0547    08/03/25 0647  insulin aspart U-100 injection 0-10 Units         -- SubQ Before meals & nightly PRN 08/03/25 0547          Hold Oral hypoglycemics while patient is in the hospital.  Continue antibiotics, increase insulin  Tobacco dependency  Dangers of cigarette smoking were reviewed with patient in detail. Patient was not counseled Nicotine replacement options were discussed. Nicotine replacement was discussed- not prescribed  Subacute osteomyelitis of right foot    Dapto, cipro at discharge  Continue zosyn  VTE Risk Mitigation (From admission, onward)           Ordered     enoxaparin injection 40 mg  Daily         07/29/25 1050     IP VTE HIGH RISK PATIENT  Once         07/29/25 1050     Place sequential compression device  Until discontinued         07/29/25 1050                    Discharge Planning   NIDHI: 8/7/2025     Code Status: Full Code   Medical Readiness for Discharge Date: 8/4/2025  Discharge Plan A: Home          Increase insulin.  Continue antibiotics              Chacorta Yip DO  Department of Hospital Medicine   [facility]

## 2025-08-04 NOTE — PROCEDURES
Procedure performed by Aj VILLAVICENCIO under the supervision of Dr. Ann . 5 Ghanaian PICC line placed in basilic vein in left upper extremity. Informed consent obtained including risks and possible complications. Patient pepped with chloro prep and draped in a sterile fashion.  2 cc 1% lidocaine infused. Utilizing U/S guidance a 49 cm 5 Ghanaian PICC line placed and position verified by fluoroscopy. PICC line flushed with heparinized saline.Statlock and bandage applied. Patient tolerated procedure well with no immediate complication.

## 2025-08-04 NOTE — DISCHARGE SUMMARY
[facility]  [facility] Medicine  Discharge Summary      Patient Name: Wallace Pedro  MRN: [id number]  SARAH: [id number]  Patient Class: IP- Inpatient  Admission Date: 7/29/2025  Hospital Length of Stay: 6 days  Discharge Date and Time: {IP DISCHARGE DATE:[id number]}  Attending Physician: Chacorta Yip DO   Discharging Provider: Chacorta Yip DO  Primary Care Provider: Jamilah Primary Doctor    Primary Care Team: Networked reference to record PCT     HPI:   49-year-old white male with a past medical history of insulin dependent diabetes, diabetic foot wound in the right foot, diabetic neuropathy, diabetic dyslipidemia hypertension obesity presents to the ED after evaluation in surgery clinic today.  Patient had a debridement of a right foot wound a few weeks ago.  Was kept in-house on IV antibiotics for several days and discharged home with doxycycline.  Patient has been taking his antibiotic.  His girlfriend has been helping him with wound care.  Over the last few days the wound has been to drain more in developed a worsening odor.  Presented to surgery Clinic today for follow up.  Sent to the ED for admission and debridement and IV antibiotics this afternoon.  Review of systems otherwise negative    Procedure(s) (LRB):  DEBRIDEMENT, FOOT (Right)  AMPUTATION, 4th toe (Right)      Hospital Course:   7/30 awaiting cultures.  Increase insulin  7/31 increase insulin.  Patient has Enterococcus Enterobacter E coli and GBS grown in his wound.  He has Enterococcus GBS and preliminary DNR is in the bone we would assume this is E coli given wound cultures.  All should be covered with Zosyn.  8/1 continue Zosyn for now  8/2 we have a antibiotics regimen in place.  Needs a PICC line.  This will not be done until Monday.  Discharge thereafter  8/3 PICC tomorrow home with cipro and dapto  8/4 PICC line placed awaiting approval for antibiotics  8/4 antibiotics secured.  Stable for discharge.  Follow up PCP.  Follow  "up General surgery     Goals of Care Treatment Preferences:  Code Status: Full Code         Consults:   Consults (From admission, onward)          Status Ordering Provider     Inpatient consult to Diabetes educator  Once        Provider:  (Not yet assigned)    Acknowledged YANG ÁLVAREZ     Inpatient consult to Social Work  Once        Provider:  (Not yet assigned)    Completed YANG ÁLVAREZ     [facility]  [facility]        Provider:  (Not yet assigned)    Acknowledged NKECHI DOUGLAS     Inpatient consult to Social Work  Once        Provider:  (Not yet assigned)    Completed REGINO HOLGUIN            Assessment & Plan  Diabetic foot ulcer  Patient's FSGs are uncontrolled due to hyperglycemia on current medication regimen.  Last A1c reviewed-   Lab Results   Component Value Date    HGBA1C 11.6 (H) 07/02/2025     Most recent fingerstick glucose reviewed- No results for input(s): "POCTGLUCOSE" in the last 24 hours.  Current correctional scale  Low  Maintain anti-hyperglycemic dose as follows-   Antihyperglycemics (From admission, onward)      Start     Stop Route Frequency Ordered    08/05/25 0900  insulin glargine U-100 (Lantus) injection 30 Units         -- SubQ Daily 08/04/25 1017    08/03/25 0647  insulin aspart U-100 injection 0-10 Units         -- SubQ Before meals & nightly PRN 08/03/25 0547          Hold Oral hypoglycemics while patient is in the hospital.  7/30 Debridement yesterday  Continue vancomycin and Zosyn  7/31 can discontinue vancomycin, continue Zosyn  8/1 continue Zosyn.  Continue Zosyn while here at discharge we will transitioned to daptomycin 800 mg daily and ciprofloxacin 750 mg b.i.d..  IDSA guideline support deescalating from IV to oral antibiotics for osteomyelitis if sensitivity support efficacy of agent with high oral bioavailability such as fluoroquinolone  8/2 continue Zosyn for now  8/3 continue zosyn. Home with oral cipro and dapto  8/4 awaiting insurance " approval for IV antibiotics  Essential hypertension  Patient's blood pressure range in the last 24 hours was: BP  Min: 126/78  Max: 187/112.The patient's inpatient anti-hypertensive regimen is listed below:  Current Antihypertensives  amLODIPine tablet 5 mg, Daily, Oral  losartan tablet 50 mg, Daily, Oral  hydroCHLOROthiazide tablet 12.5 mg, Daily, Oral  hydrALAZINE injection 10 mg, Every 6 hours PRN, Intravenous    Plan  - BP is controlled, no changes needed to their regimen  - follow  BP reasonably controlled  8/3 bp ok  8/4 blood pressure looks good  Cellulitis of multiple sites of lower extremity  IV antibiotics surgical debridement  Zosyn  Type 2 diabetes mellitus with hyperglycemia, without long-term current use of insulin  We will maintain home insulin regimen given patient is self-pay.  Try and optimize his 70 30 regimen  We will add sliding scale.  Increase 70 30  7/31 increase 70 30 again today  8/1 increase insulin again today  8/2 increase insulin today  8/3 continues to have poor control, now that patient is insured will transition to basal bolus regimen and stop 70/30. Hopefully this will provide better glucose control  8/4 increase insulin  Other hyperlipidemia  Statin  continue  Foot ulcer with necrosis of muscle, right  Debridement today  Appreciate surgery  Diabetic peripheral vascular disease  Glycemic control statin  Improve glycemic control  Escalate insulin  Diabetic nephropathy  Glycemic control continue Arb  Continue ARB  Continue Arb  Continue Arb  Obesity (BMI 30-39.9)  Body mass index is 33.37 kg/m². Morbid obesity complicates all aspects of disease management from diagnostic modalities to treatment. Weight loss encouraged and health benefits explained to patient.       DM type 2 with diabetic dyslipidemia  Patient's FSGs are uncontrolled due to hyperglycemia on current medication regimen.  Last A1c reviewed-   Lab Results   Component Value Date    HGBA1C 11.6 (H) 07/02/2025     Most  "recent fingerstick glucose reviewed- No results for input(s): "POCTGLUCOSE" in the last 24 hours.  Current correctional scale  Low  Maintain anti-hyperglycemic dose as follows-   Antihyperglycemics (From admission, onward)      Start     Stop Route Frequency Ordered    08/05/25 0900  insulin glargine U-100 (Lantus) injection 30 Units         -- SubQ Daily 08/04/25 1017    08/03/25 0647  insulin aspart U-100 injection 0-10 Units         -- SubQ Before meals & nightly PRN 08/03/25 0547          Hold Oral hypoglycemics while patient is in the hospital.  Escalate insulin  Diabetic foot infection  Patient's FSGs are uncontrolled due to hyperglycemia on current medication regimen.  Last A1c reviewed-   Lab Results   Component Value Date    HGBA1C 11.6 (H) 07/02/2025     Most recent fingerstick glucose reviewed- No results for input(s): "POCTGLUCOSE" in the last 24 hours.  Current correctional scale  Medium  Maintain anti-hyperglycemic dose as follows-   Antihyperglycemics (From admission, onward)      Start     Stop Route Frequency Ordered    08/05/25 0900  insulin glargine U-100 (Lantus) injection 30 Units         -- SubQ Daily 08/04/25 1017    08/03/25 0647  insulin aspart U-100 injection 0-10 Units         -- SubQ Before meals & nightly PRN 08/03/25 0547          Hold Oral hypoglycemics while patient is in the hospital.  Continue antibiotics, increase insulin  Tobacco dependency  Dangers of cigarette smoking were reviewed with patient in detail. Patient was not counseled Nicotine replacement options were discussed. Nicotine replacement was discussed- not prescribed  Subacute osteomyelitis of right foot    Dapto, cipro at discharge  Continue zosyn  Final Active Diagnoses:    Diagnosis Date Noted POA    PRINCIPAL PROBLEM:  Diabetic foot ulcer [E11.621, L97.509] 07/15/2025 Yes     Chronic    Subacute osteomyelitis of right foot [M86.271] 08/02/2025 Yes    Tobacco dependency [F17.200] 07/30/2025 Yes    Obesity (BMI " 30-39.9) [E66.9] 07/29/2025 Yes    DM type 2 with diabetic dyslipidemia [E11.69, E78.5] 07/29/2025 Yes    Diabetic foot infection [E11.628, L08.9] 07/29/2025 Yes    Diabetic peripheral vascular disease [E11.51] 07/15/2025 Yes    Diabetic nephropathy [E11.21] 07/15/2025 Yes    Foot ulcer with necrosis of muscle, right [L97.513] 07/13/2025 Yes    Cellulitis of multiple sites of lower extremity [L03.119] 05/04/2024 Yes    Type 2 diabetes mellitus with hyperglycemia, without long-term current use of insulin [E11.65] 05/04/2024 Yes    Other hyperlipidemia [E78.49] 05/04/2024 Yes    Essential hypertension [I10] 12/28/2022 Yes      Problems Resolved During this Admission:       Discharged Condition: {condition:76462}    Disposition: Home-Health Care [facility]    Follow Up:   Contact information for follow-up providers       Moises Varela, DO Follow up in 2 week(s).    Specialties: General Surgery, Surgery  Contact information:  [address] [facility]  [facility] [address]  [phone]               Ruel Contreras MD. Schedule an appointment as soon as possible for a visit in 1 week(s).    Specialty: Family Medicine  Contact information:  [address] [facility] [address]  [phone]                       Contact information for after-discharge care       Dialysis/Infusion       [facility] .    Service: Home Infusion and Injection  Contact information:  [address] [place] [id number]  [phone]                                 Patient Instructions:      Ambulatory referral/consult to Smoking Cessation Program   Standing Status: Future   Referral Priority: Routine Referral Type: Consultation   Referral Reason: Specialty Services Required   Requested Specialty: CTTS   Number of Visits Requested: 1     Diet diabetic       Significant Diagnostic Studies: {diagnostics:25752}    Pending Diagnostic Studies:       Procedure Component Value Units  Date/Time    IR PICC Line Placement w/o Port w/ Img > 6 Y/O [[id number]] Resulted: 08/04/25 0901    Order Status: Sent Lab Status: In process Updated: 08/04/25 0904    IR PICC Line Placement w/o Port w/ Img > 6 Y/O [[id number]]     Order Status: Sent Lab Status: No result     US Guided Vascular Access [[id number]] Resulted: 08/04/25 0858    Order Status: Sent Lab Status: In process Updated: 08/04/25 0923           Medications:  {DISCHARGE MEDS [facility]:57839}    Indwelling Lines/Drains at time of discharge:   Lines/Drains/Airways       Peripherally Inserted Central Catheter Line  Duration             PICC Double Lumen 08/04/25 0857 left basilic <1 day                        Time spent on the discharge of patient: *** minutes         Chacorta Yip DO  Department of Hospital Medicine  [facility]

## 2025-08-04 NOTE — ASSESSMENT & PLAN NOTE
"Patient's FSGs are uncontrolled due to hyperglycemia on current medication regimen.  Last A1c reviewed-   Lab Results   Component Value Date    HGBA1C 11.6 (H) 07/02/2025     Most recent fingerstick glucose reviewed- No results for input(s): "POCTGLUCOSE" in the last 24 hours.  Current correctional scale  Medium  Maintain anti-hyperglycemic dose as follows-   Antihyperglycemics (From admission, onward)      Start     Stop Route Frequency Ordered    08/05/25 0900  insulin glargine U-100 (Lantus) injection 30 Units         -- SubQ Daily 08/04/25 1017    08/03/25 0647  insulin aspart U-100 injection 0-10 Units         -- SubQ Before meals & nightly PRN 08/03/25 0547          Hold Oral hypoglycemics while patient is in the hospital.  Continue antibiotics, increase insulin  "

## 2025-08-04 NOTE — ASSESSMENT & PLAN NOTE
"Patient's FSGs are uncontrolled due to hyperglycemia on current medication regimen.  Last A1c reviewed-   Lab Results   Component Value Date    HGBA1C 11.6 (H) 07/02/2025     Most recent fingerstick glucose reviewed- No results for input(s): "POCTGLUCOSE" in the last 24 hours.  Current correctional scale  Low  Maintain anti-hyperglycemic dose as follows-   Antihyperglycemics (From admission, onward)      Start     Stop Route Frequency Ordered    08/03/25 0900  insulin glargine U-100 (Lantus) injection 20 Units         -- SubQ Daily 08/03/25 0547    08/03/25 0647  insulin aspart U-100 injection 0-10 Units         -- SubQ Before meals & nightly PRN 08/03/25 0547          Hold Oral hypoglycemics while patient is in the hospital.  Escalate insulin  "

## 2025-08-04 NOTE — NURSING
Secure chatted Dr. Machado to inform her that patient's blood pressure has been running high. Last three blood pressures 158/103, 187/112, and 161/105.

## 2025-08-04 NOTE — ASSESSMENT & PLAN NOTE
Patient's blood pressure range in the last 24 hours was: BP  Min: 126/78  Max: 187/112.The patient's inpatient anti-hypertensive regimen is listed below:  Current Antihypertensives  amLODIPine tablet 5 mg, Daily, Oral  losartan tablet 50 mg, Daily, Oral  hydroCHLOROthiazide tablet 12.5 mg, Daily, Oral  hydrALAZINE injection 10 mg, Every 6 hours PRN, Intravenous    Plan  - BP is controlled, no changes needed to their regimen  - follow  BP reasonably controlled  8/3 bp ok  8/4 blood pressure looks good  Follow up

## 2025-08-04 NOTE — PROGRESS NOTES
No acute changes overnight.  Patient scheduled for PICC line placement this morning.  Dressing to right foot noted clean dry and in place.  Continue daily dressing changes per nursing staff with wet-to-dry (Vashe) packing.  Okay to discharge home from surgery standpoint with IV antibiotics.  Follow up in general surgery clinic in 2 weeks.

## 2025-08-04 NOTE — PT/OT/SLP PROGRESS
"Physical Therapy Treatment    Patient Name:  Wallace Pedro   MRN:  [id number]    Recommendations:     Discharge Recommendations: No Therapy Indicated  Discharge Equipment Recommendations: none  Barriers to discharge: None    Assessment:     Wallace Pedro is a 49 y.o. male admitted with a medical diagnosis of Diabetic foot ulcer.  He presents with the following impairments/functional limitations: impaired skin, orthopedic precautions.    Rehab Prognosis: Good; patient would benefit from acute skilled PT services to address these deficits and reach maximum level of function.    Recent Surgery: Procedure(s) (LRB):  DEBRIDEMENT, FOOT (Right)  AMPUTATION, 4th toe (Right) 6 Days Post-Op    Plan:     During this hospitalization, patient to be seen 5 x/week to address the identified rehab impairments via gait training, therapeutic activities, therapeutic exercises and progress toward the following goals:    Plan of Care Expires:  08/31/25    Subjective     Chief Complaint: Diabetic foot ulcer   Patient/Family Comments/goals: "I'm going home"  Pain/Comfort:         Objective:     Communicated with Mabel Juarez RN prior to session.  Patient found sitting edge of bed with peripheral IV upon PT entry to room.     General Precautions: Standard, fall  Orthopedic Precautions: RLE non weight bearing  Braces: N/A  Respiratory Status: Room air     Functional Mobility:  Transfers:     Sit to Stand:  supervision with rolling walker  Gait: 68' with RW and standby assist; WB via R heel with cueing to maintain NWB status      AM-PAC 6 CLICK MOBILITY  Turning over in bed (including adjusting bedclothes, sheets and blankets)?: 4  Sitting down on and standing up from a chair with arms (e.g., wheelchair, bedside commode, etc.): 4  Moving from lying on back to sitting on the side of the bed?: 4  Moving to and from a bed to a chair (including a wheelchair)?: 4  Need to walk in hospital room?: 4  Climbing 3-5 steps with a railing?: 3  Basic " Mobility Total Score: 23       Treatment & Education:      Patient left sitting edge of bed with all lines intact and spouse present..    GOALS:   Multidisciplinary Problems       Physical Therapy Goals          Problem: Physical Therapy    Goal Priority Disciplines Outcome Interventions   Physical Therapy Goal     PT, PT/OT Progressing    Description: Short Term Goals to be met by: 25    Patient will increase functional independence with mobility by performin. Supine to sit with Modified [place]  2. Sit to stand transfer with Modified [place]  3. Bed to chair transfer with Modified [place] using Rolling Walker,  NWB R LE  4. Gait  x 100 feet with Modified [place] using Rolling Walker,  NWB R LE.   5. Lower extremity exercise program x30 reps per handout, with assistance as needed      Long Term Goals to be met by: 25     1. Pt with regain full independent functional mobility to return to prior activities of daily living                        DME Justifications:      Time Tracking:     PT Received On: 25  PT Start Time: 1330     PT Stop Time: 1342  PT Total Time (min): 12 min     Billable Minutes: Gait Training 10    Treatment Type: Treatment  PT/PTA: PTA     Number of PTA visits since last PT visit: 2025

## 2025-08-04 NOTE — ASSESSMENT & PLAN NOTE
We will maintain home insulin regimen given patient is self-pay.  Try and optimize his 70 30 regimen  We will add sliding scale.  Increase 70 30  7/31 increase 70 30 again today  8/1 increase insulin again today  8/2 increase insulin today  8/3 continues to have poor control, now that patient is insured will transition to basal bolus regimen and stop 70/30. Hopefully this will provide better glucose control  8/4 increase insulin

## 2025-08-04 NOTE — PHYSICIAN QUERY
Please provide further specification of the debridement performed on the right  foot:  Excisional debridement of bone

## 2025-08-04 NOTE — TELEPHONE ENCOUNTER
Copied from CRM #[id number]. Topic: Appointments - Appointment Scheduling  >> Aug 4, 2025  2:32 PM Ethel wrote:  TERESA Pedro is being discharged on today and needs a 2 week F/U appt with . First available wasn't until 08/26. Please give pt a call with appt date and time.    Pt informed to see Dr. Varela on 8/19/25 at 1315.

## 2025-08-04 NOTE — ASSESSMENT & PLAN NOTE
"Patient's FSGs are uncontrolled due to hyperglycemia on current medication regimen.  Last A1c reviewed-   Lab Results   Component Value Date    HGBA1C 11.6 (H) 07/02/2025     Most recent fingerstick glucose reviewed- No results for input(s): "POCTGLUCOSE" in the last 24 hours.  Current correctional scale  Low  Maintain anti-hyperglycemic dose as follows-   Antihyperglycemics (From admission, onward)      Start     Stop Route Frequency Ordered    08/05/25 0900  insulin glargine U-100 (Lantus) injection 30 Units         -- SubQ Daily 08/04/25 1017    08/03/25 0647  insulin aspart U-100 injection 0-10 Units         -- SubQ Before meals & nightly PRN 08/03/25 0547          Hold Oral hypoglycemics while patient is in the hospital.  Escalate insulin  "

## 2025-08-04 NOTE — ASSESSMENT & PLAN NOTE
We will maintain home insulin regimen given patient is self-pay.  Try and optimize his 70 30 regimen  We will add sliding scale.  Increase 70 30  7/31 increase 70 30 again today  8/1 increase insulin again today  8/2 increase insulin today  8/3 continues to have poor control, now that patient is insured will transition to basal bolus regimen and stop 70/30. Hopefully this will provide better glucose control  8/4 increase insulin  We will send home with Lantus

## 2025-08-04 NOTE — DISCHARGE SUMMARY
[facility]  [facility]  Discharge Summary      Patient Name: Wallace Pedro  MRN: [id number]  SARAH: [id number]  Patient Class: IP- Inpatient  Admission Date: 7/29/2025  Hospital Length of Stay: 6 days  Discharge Date and Time: 08/04/2025 2:33 PM  Attending Physician: Chacorta Yip DO   Discharging Provider: Chacorta Yip DO  Primary Care Provider: Jamilah Primary Doctor    Primary Care Team: Networked reference to record PCT     HPI:   49-year-old white male with a past medical history of insulin dependent diabetes, diabetic foot wound in the right foot, diabetic neuropathy, diabetic dyslipidemia hypertension obesity presents to the ED after evaluation in surgery clinic today.  Patient had a debridement of a right foot wound a few weeks ago.  Was kept in-house on IV antibiotics for several days and discharged home with doxycycline.  Patient has been taking his antibiotic.  His girlfriend has been helping him with wound care.  Over the last few days the wound has been to drain more in developed a worsening odor.  Presented to surgery Clinic today for follow up.  Sent to the ED for admission and debridement and IV antibiotics this afternoon.  Review of systems otherwise negative    Procedure(s) (LRB):  DEBRIDEMENT, FOOT (Right)  AMPUTATION, 4th toe (Right)      Hospital Course:   7/30 awaiting cultures.  Increase insulin  7/31 increase insulin.  Patient has Enterococcus Enterobacter E coli and GBS grown in his wound.  He has Enterococcus GBS and preliminary DNR is in the bone we would assume this is E coli given wound cultures.  All should be covered with Zosyn.  8/1 continue Zosyn for now  8/2 we have a antibiotics regimen in place.  Needs a PICC line.  This will not be done until Monday.  Discharge thereafter  8/3 PICC tomorrow home with cipro and dapto  8/4 PICC line placed awaiting approval for antibiotics  8/4 antibiotics secured.  Stable for discharge.  Follow up PCP.  Follow up General  "surgery     Goals of Care Treatment Preferences:  Code Status: Full Code         Consults:   Consults (From admission, onward)          Status Ordering Provider     Inpatient consult to Diabetes educator  Once        Provider:  (Not yet assigned)    Acknowledged YANG ÁLVAREZ     Inpatient consult to Social Work  Once        Provider:  (Not yet assigned)    Completed YANG ÁLVAREZ     [facility]  Once        Provider:  (Not yet assigned)    Acknowledged NKECHI DOUGLAS     Inpatient consult to Social Work  Once        Provider:  (Not yet assigned)    Completed REGINO HOLGUIN            Assessment & Plan  Diabetic foot ulcer  Patient's FSGs are uncontrolled due to hyperglycemia on current medication regimen.  Last A1c reviewed-   Lab Results   Component Value Date    HGBA1C 11.6 (H) 07/02/2025     Most recent fingerstick glucose reviewed- No results for input(s): "POCTGLUCOSE" in the last 24 hours.  Current correctional scale  Low  Maintain anti-hyperglycemic dose as follows-   Antihyperglycemics (From admission, onward)      Start     Stop Route Frequency Ordered    08/05/25 0900  insulin glargine U-100 (Lantus) injection 30 Units         -- SubQ Daily 08/04/25 1017    08/03/25 0647  insulin aspart U-100 injection 0-10 Units         -- SubQ Before meals & nightly PRN 08/03/25 0547          Hold Oral hypoglycemics while patient is in the hospital.  7/30 Debridement yesterday  Continue vancomycin and Zosyn  7/31 can discontinue vancomycin, continue Zosyn  8/1 continue Zosyn.  Continue Zosyn while here at discharge we will transitioned to daptomycin 800 mg daily and ciprofloxacin 750 mg b.i.d..  IDSA guideline support deescalating from IV to oral antibiotics for osteomyelitis if sensitivity support efficacy of agent with high oral bioavailability such as fluoroquinolone  8/2 continue Zosyn for now  8/3 continue zosyn. Home with oral cipro and dapto  8/4 awaiting insurance approval for IV " antibiotics  Daptomycin and Cipro for 6 weeks  Essential hypertension  Patient's blood pressure range in the last 24 hours was: BP  Min: 126/78  Max: 187/112.The patient's inpatient anti-hypertensive regimen is listed below:  Current Antihypertensives  amLODIPine tablet 5 mg, Daily, Oral  losartan tablet 50 mg, Daily, Oral  hydroCHLOROthiazide tablet 12.5 mg, Daily, Oral  hydrALAZINE injection 10 mg, Every 6 hours PRN, Intravenous    Plan  - BP is controlled, no changes needed to their regimen  - follow  BP reasonably controlled  8/3 bp ok  8/4 blood pressure looks good  Follow up  Cellulitis of multiple sites of lower extremity  IV antibiotics surgical debridement  Zosyn  Type 2 diabetes mellitus with hyperglycemia, without long-term current use of insulin  We will maintain home insulin regimen given patient is self-pay.  Try and optimize his 70 30 regimen  We will add sliding scale.  Increase 70 30  7/31 increase 70 30 again today  8/1 increase insulin again today  8/2 increase insulin today  8/3 continues to have poor control, now that patient is insured will transition to basal bolus regimen and stop 70/30. Hopefully this will provide better glucose control  8/4 increase insulin  We will send home with Lantus  Other hyperlipidemia  Statin  continue  Foot ulcer with necrosis of muscle, right  Debridement today  Appreciate surgery  Diabetic peripheral vascular disease  Glycemic control statin  Improve glycemic control  Escalate insulin  Diabetic nephropathy  Glycemic control continue Arb  Continue ARB  Continue Arb  Continue Arb  Obesity (BMI 30-39.9)  Body mass index is 33.37 kg/m². Morbid obesity complicates all aspects of disease management from diagnostic modalities to treatment. Weight loss encouraged and health benefits explained to patient.       DM type 2 with diabetic dyslipidemia  Patient's FSGs are uncontrolled due to hyperglycemia on current medication regimen.  Last A1c reviewed-   Lab Results  "  Component Value Date    HGBA1C 11.6 (H) 07/02/2025     Most recent fingerstick glucose reviewed- No results for input(s): "POCTGLUCOSE" in the last 24 hours.  Current correctional scale  Low  Maintain anti-hyperglycemic dose as follows-   Antihyperglycemics (From admission, onward)      Start     Stop Route Frequency Ordered    08/05/25 0900  insulin glargine U-100 (Lantus) injection 30 Units         -- SubQ Daily 08/04/25 1017    08/03/25 0647  insulin aspart U-100 injection 0-10 Units         -- SubQ Before meals & nightly PRN 08/03/25 0547          Hold Oral hypoglycemics while patient is in the hospital.  Escalate insulin  Diabetic foot infection  Patient's FSGs are uncontrolled due to hyperglycemia on current medication regimen.  Last A1c reviewed-   Lab Results   Component Value Date    HGBA1C 11.6 (H) 07/02/2025     Most recent fingerstick glucose reviewed- No results for input(s): "POCTGLUCOSE" in the last 24 hours.  Current correctional scale  Medium  Maintain anti-hyperglycemic dose as follows-   Antihyperglycemics (From admission, onward)      Start     Stop Route Frequency Ordered    08/05/25 0900  insulin glargine U-100 (Lantus) injection 30 Units         -- SubQ Daily 08/04/25 1017    08/03/25 0647  insulin aspart U-100 injection 0-10 Units         -- SubQ Before meals & nightly PRN 08/03/25 0547          Hold Oral hypoglycemics while patient is in the hospital.  Continue antibiotics, increase insulin  Tobacco dependency  Dangers of cigarette smoking were reviewed with patient in detail. Patient was not counseled Nicotine replacement options were discussed. Nicotine replacement was discussed- not prescribed  Subacute osteomyelitis of right foot    Dapto, cipro at discharge  Continue zosyn  Final Active Diagnoses:    Diagnosis Date Noted POA    PRINCIPAL PROBLEM:  Diabetic foot ulcer [E11.621, L97.509] 07/15/2025 Yes     Chronic    Subacute osteomyelitis of right foot [M86.271] 08/02/2025 Yes    " Tobacco dependency [F17.200] 07/30/2025 Yes    Obesity (BMI 30-39.9) [E66.9] 07/29/2025 Yes    DM type 2 with diabetic dyslipidemia [E11.69, E78.5] 07/29/2025 Yes    Diabetic foot infection [E11.628, L08.9] 07/29/2025 Yes    Diabetic peripheral vascular disease [E11.51] 07/15/2025 Yes    Diabetic nephropathy [E11.21] 07/15/2025 Yes    Foot ulcer with necrosis of muscle, right [L97.513] 07/13/2025 Yes    Cellulitis of multiple sites of lower extremity [L03.119] 05/04/2024 Yes    Type 2 diabetes mellitus with hyperglycemia, without long-term current use of insulin [E11.65] 05/04/2024 Yes    Other hyperlipidemia [E78.49] 05/04/2024 Yes    Essential hypertension [I10] 12/28/2022 Yes      Problems Resolved During this Admission:       Discharged Condition: good    [place] [facility]    Follow Up:   Contact information for follow-up providers       Moises Varela, DO Follow up in 2 week(s).    Specialties: General Surgery, Surgery  Contact information:  [address] [facility] [address]  [phone]               Ruel Contreras MD. Schedule an appointment as soon as possible for a visit in 1 week(s).    Specialty: Family Medicine  Contact information:  [address] [facility] [address]  [phone]                       Contact information for after-discharge care       Dialysis/Infusion       [facility] .    Service: Home Infusion and Injection  Contact information:  [address] [facility] [address]  [phone]                                 Patient Instructions:      Ambulatory referral/consult to Smoking Cessation Program   Standing Status: Future   Referral Priority: Routine Referral Type: Consultation   Referral Reason: Specialty Services Required   Requested Specialty: CTTS   Number of Visits Requested: 1     Diet diabetic       Significant Diagnostic Studies: Labs: All labs within the past 24 hours have been  reviewed    Pending Diagnostic Studies:       Procedure Component Value Units Date/Time    IR PICC Line Placement w/o Port w/ Img > 4 Y/O [[id number]] Resulted: 08/04/25 0901    Order Status: Sent Lab Status: In process Updated: 08/04/25 0904    IR PICC Line Placement w/o Port w/ Img > 4 Y/O [[id number]]     Order Status: Sent Lab Status: No result     US Guided Vascular Access [[id number]] Resulted: 08/04/25 0858    Order Status: Sent Lab Status: In process Updated: 08/04/25 0923           Medications:  Reconciled Home Medications:      Medication List        START taking these medications      ciprofloxacin HCl 250 MG tablet  Commonly known as: CIPRO  Take 3 tablets (750 mg total) by mouth every 12 (twelve) hours.     insulin glargine-yfgn 100 unit/mL (3 mL) Inpn  Commonly known as: SEMGLEE(INSULIN GLARG-YFGN)PEN  Inject 25 Units into the skin every evening.     nicotine 14 mg/24 hr  Commonly known as: NICODERM CQ  Place 1 patch onto the skin once daily.            CONTINUE taking these medications      atorvastatin 40 MG tablet  Commonly known as: LIPITOR  Take 1 tablet (40 mg total) by mouth once daily.     insulin syringe-needle U-100 0.3 mL 29 gauge Syrg  1 Needle by Misc.(Non-Drug; Combo Route) route 2 (two) times a day.     losartan-hydrochlorothiazide 50-12.5 mg 50-12.5 mg per tablet  Commonly known as: HYZAAR  Take 1 tablet by mouth once daily.            STOP taking these medications      amLODIPine 5 MG tablet  Commonly known as: NORVASC     doxycycline 100 MG tablet  Commonly known as: VIBRA-TABS     metFORMIN 500 MG tablet  Commonly known as: GLUCOPHAGE     NovoLIN 70/30 U-100 Insulin 100 unit/mL (70-30) injection  Generic drug: insulin NPH-insulin regular (70/30)              Indwelling Lines/Drains at time of discharge:   Lines/Drains/Airways       Peripherally Inserted Central Catheter Line  Duration             PICC Double Lumen 08/04/25 0857 left basilic <1 day                        Time spent on  the discharge of patient: 35 minutes         Chacorta Yip DO  Department of Hospital Medicine  [facility]

## 2025-08-04 NOTE — ASSESSMENT & PLAN NOTE
Patient's blood pressure range in the last 24 hours was: BP  Min: 125/74  Max: 187/112.The patient's inpatient anti-hypertensive regimen is listed below:  Current Antihypertensives  amLODIPine tablet 5 mg, Daily, Oral  losartan tablet 50 mg, Daily, Oral  hydroCHLOROthiazide tablet 12.5 mg, Daily, Oral  hydrALAZINE injection 10 mg, Every 6 hours PRN, Intravenous    Plan  - BP is controlled, no changes needed to their regimen  - follow  BP reasonably controlled  8/3 bp ok  8/4 blood pressure looks good

## 2025-08-04 NOTE — PLAN OF CARE
Pt received picc line. Ss spoke with [employer] and we are still awaiting benefits for iv infusion. Ss following.      100pm ss spoke with [employer] and we are awaiting ins benefits at this time.

## 2025-08-04 NOTE — ASSESSMENT & PLAN NOTE
"Patient's FSGs are uncontrolled due to hyperglycemia on current medication regimen.  Last A1c reviewed-   Lab Results   Component Value Date    HGBA1C 11.6 (H) 07/02/2025     Most recent fingerstick glucose reviewed- No results for input(s): "POCTGLUCOSE" in the last 24 hours.  Current correctional scale  Low  Maintain anti-hyperglycemic dose as follows-   Antihyperglycemics (From admission, onward)      Start     Stop Route Frequency Ordered    08/05/25 0900  insulin glargine U-100 (Lantus) injection 30 Units         -- SubQ Daily 08/04/25 1017    08/03/25 0647  insulin aspart U-100 injection 0-10 Units         -- SubQ Before meals & nightly PRN 08/03/25 0547          Hold Oral hypoglycemics while patient is in the hospital.  7/30 Debridement yesterday  Continue vancomycin and Zosyn  7/31 can discontinue vancomycin, continue Zosyn  8/1 continue Zosyn.  Continue Zosyn while here at discharge we will transitioned to daptomycin 800 mg daily and ciprofloxacin 750 mg b.i.d..  IDSA guideline support deescalating from IV to oral antibiotics for osteomyelitis if sensitivity support efficacy of agent with high oral bioavailability such as fluoroquinolone  8/2 continue Zosyn for now  8/3 continue zosyn. Home with oral cipro and dapto  8/4 awaiting insurance approval for IV antibiotics  Daptomycin and Cipro for 6 weeks  "

## 2025-08-04 NOTE — ASSESSMENT & PLAN NOTE
"Patient's FSGs are uncontrolled due to hyperglycemia on current medication regimen.  Last A1c reviewed-   Lab Results   Component Value Date    HGBA1C 11.6 (H) 07/02/2025     Most recent fingerstick glucose reviewed- No results for input(s): "POCTGLUCOSE" in the last 24 hours.  Current correctional scale  Medium  Maintain anti-hyperglycemic dose as follows-   Antihyperglycemics (From admission, onward)      Start     Stop Route Frequency Ordered    08/03/25 0900  insulin glargine U-100 (Lantus) injection 20 Units         -- SubQ Daily 08/03/25 0547    08/03/25 0647  insulin aspart U-100 injection 0-10 Units         -- SubQ Before meals & nightly PRN 08/03/25 0547          Hold Oral hypoglycemics while patient is in the hospital.  Continue antibiotics, increase insulin  "

## 2025-08-07 ENCOUNTER — OFFICE VISIT (OUTPATIENT)
Dept: WOUND CARE | Facility: HOSPITAL | Age: 50
End: 2025-08-07
Attending: FAMILY MEDICINE
Payer: COMMERCIAL

## 2025-08-07 VITALS
TEMPERATURE: 98 F | DIASTOLIC BLOOD PRESSURE: 105 MMHG | RESPIRATION RATE: 18 BRPM | HEART RATE: 90 BPM | SYSTOLIC BLOOD PRESSURE: 150 MMHG

## 2025-08-07 DIAGNOSIS — E11.65 TYPE 2 DIABETES MELLITUS WITH HYPERGLYCEMIA, WITHOUT LONG-TERM CURRENT USE OF INSULIN: ICD-10-CM

## 2025-08-07 DIAGNOSIS — S81.801A WOUND OF RIGHT LOWER EXTREMITY, INITIAL ENCOUNTER: Primary | ICD-10-CM

## 2025-08-07 DIAGNOSIS — L03.119 CELLULITIS OF MULTIPLE SITES OF LOWER EXTREMITY: ICD-10-CM

## 2025-08-07 DIAGNOSIS — E11.621 DIABETIC ULCER OF RIGHT MIDFOOT ASSOCIATED WITH TYPE 2 DIABETES MELLITUS, UNSPECIFIED ULCER STAGE: Chronic | ICD-10-CM

## 2025-08-07 DIAGNOSIS — L97.419 DIABETIC ULCER OF RIGHT MIDFOOT ASSOCIATED WITH TYPE 2 DIABETES MELLITUS, UNSPECIFIED ULCER STAGE: Chronic | ICD-10-CM

## 2025-08-07 DIAGNOSIS — M86.271 SUBACUTE OSTEOMYELITIS OF RIGHT FOOT: ICD-10-CM

## 2025-08-07 DIAGNOSIS — L97.519 ULCER OF RIGHT FOOT, UNSPECIFIED ULCER STAGE: ICD-10-CM

## 2025-08-07 PROCEDURE — 1159F MED LIST DOCD IN RCRD: CPT | Mod: CPTII,,, | Performed by: FAMILY MEDICINE

## 2025-08-07 PROCEDURE — 1111F DSCHRG MED/CURRENT MED MERGE: CPT | Mod: CPTII,,, | Performed by: FAMILY MEDICINE

## 2025-08-07 PROCEDURE — 99203 OFFICE O/P NEW LOW 30 MIN: CPT | Mod: ,,, | Performed by: FAMILY MEDICINE

## 2025-08-07 PROCEDURE — 99204 OFFICE O/P NEW MOD 45 MIN: CPT | Performed by: FAMILY MEDICINE

## 2025-08-07 PROCEDURE — 3062F POS MACROALBUMINURIA REV: CPT | Mod: CPTII,,, | Performed by: FAMILY MEDICINE

## 2025-08-07 PROCEDURE — 3077F SYST BP >= 140 MM HG: CPT | Mod: CPTII,,, | Performed by: FAMILY MEDICINE

## 2025-08-07 PROCEDURE — 3066F NEPHROPATHY DOC TX: CPT | Mod: CPTII,,, | Performed by: FAMILY MEDICINE

## 2025-08-07 PROCEDURE — 3080F DIAST BP >= 90 MM HG: CPT | Mod: CPTII,,, | Performed by: FAMILY MEDICINE

## 2025-08-07 PROCEDURE — 3046F HEMOGLOBIN A1C LEVEL >9.0%: CPT | Mod: CPTII,,, | Performed by: FAMILY MEDICINE

## 2025-08-07 NOTE — PATIENT INSTRUCTIONS
Wash hands before and after wound care   Clean with vashe or  baby shampoo and water    Apply aquacel ag advantage to wound (R) leg.  Cover and secure with 4x4s, kerlix, and paper tape  Change daily and as needed for drainage or soilage  Keep leg elevated and avoid pressure on wound    Apply wet to dry with vashe moistened gauze to wound (R) foot  Cover with gauze and wrap with ACE bandage  Change daily and as needed for drainage or soilage.  Keep leg elevated and avoid pressure on wound.    Monitor closely for s/s of infection including fever, chills, increase in pain, odor from wound, and increased redness from foot. Go to ER if any complications develop.   Keep leg elevated and avoid pressure on wound.    Diabetes:  Monitor glucose closely. Check fasting glucose and 2 hours after meals. HgA1C goal <7, fasting glucose  and 2 hours after meals.<180.    Hypertension:  Check blood pressure daily, goal <120/80

## 2025-08-07 NOTE — PROGRESS NOTES
Subjective:      Patient ID: Wallace Pedro is a 49 y.o. male.    Chief Complaint: Non-healing Wound Follow Up and Diabetic Foot Ulcer    Wallace Pedro a 49 y.o. male presents for follow up on all regular problems which are reviewed and discussed.   Reviewed records  Interviewed pt  Complex visit  Prior noncompliance trying to do better  Problem List Items Addressed This Visit          ID    Cellulitis of multiple sites of lower extremity    Subacute osteomyelitis of right foot       Endocrine    Diabetic foot ulcer (Chronic)    Type 2 diabetes mellitus with hyperglycemia, without long-term current use of insulin       Orthopedic    Ulcer of right foot    Wound of right leg - Primary       Past Medical History:  Past Medical History:   Diagnosis Date    Diabetes mellitus, type 2     Hyperlipidemia     Hypertension      Past Surgical History:   Procedure Laterality Date    DEBRIDEMENT OF FOOT Right 7/13/2025    Procedure: DEBRIDEMENT, FOOT;  Surgeon: Moises Varela DO;  Location: [facility] OR;  Service: General;  Laterality: Right;    DEBRIDEMENT OF FOOT Right 7/29/2025    Procedure: DEBRIDEMENT, FOOT;  Surgeon: Moises Varela DO;  Location: [facility] OR;  Service: General;  Laterality: Right;    TOE AMPUTATION Right 7/29/2025    Procedure: AMPUTATION, 4th toe;  Surgeon: Moises Varela DO;  Location: [facility] OR;  Service: General;  Laterality: Right;  POSSIBLE MULTIPLE TOE AMPUTATION     Review of patient's allergies indicates:  No Known Allergies  Medications Ordered Prior to Encounter[1]  Social History[2]  Family History   Problem Relation Name Age of Onset    Heart disease Mother      Diabetes Father      Hypertension Father         Review of Systems   Skin:  Positive for wound. Negative for color change, pallor and rash.     Objective:     There were no vitals taken for this visit.    Physical Exam  Pulmonary:      Effort: No respiratory distress.   Musculoskeletal:         General: Tenderness and  deformity present. No swelling or signs of injury.      Right lower leg: No edema.      Left lower leg: No edema.   Skin:     Coloration: Skin is not jaundiced or pale.      Findings: Erythema and lesion present. No bruising or rash.   Assessment:     1. Wound of right lower extremity, initial encounter    2. Ulcer of right foot, unspecified ulcer stage    3. Cellulitis of multiple sites of lower extremity    4. Subacute osteomyelitis of right foot    5. Diabetic ulcer of right midfoot associated with type 2 diabetes mellitus, unspecified ulcer stage    6. Type 2 diabetes mellitus with hyperglycemia, without long-term current use of insulin        Plan:     Problem List Items Addressed This Visit          ID    Cellulitis of multiple sites of lower extremity    Subacute osteomyelitis of right foot       Endocrine    Diabetic foot ulcer (Chronic)    Type 2 diabetes mellitus with hyperglycemia, without long-term current use of insulin       Orthopedic    Ulcer of right foot    Wound of right leg - Primary     No follow-ups on file.  Add warm salt water soaks with dressing change  Fu 1wk    I am having WALLACE Pedro maintain his losartan-hydrochlorothiazide 50-12.5 mg, atorvastatin, insulin syringe-needle U-100, ciprofloxacin HCl, insulin glargine-yfgn, nicotine, (pen needle, diabetic), HYDROcodone-acetaminophen, and 0.9% NaCl SolP 50 mL with DAPTOmycin 500 mg SolR 820 mg.    Wallace was seen today for non-healing wound follow up and diabetic foot ulcer.    Diagnoses and all orders for this visit:    Wound of right lower extremity, initial encounter  -     Ambulatory referral/consult to Wound Clinic    Ulcer of right foot, unspecified ulcer stage  -     Ambulatory referral/consult to Wound Clinic    Cellulitis of multiple sites of lower extremity    Subacute osteomyelitis of right foot    Diabetic ulcer of right midfoot associated with type 2 diabetes mellitus, unspecified ulcer stage    Type 2 diabetes mellitus with  "hyperglycemia, without long-term current use of insulin         [unfilled]  No orders of the defined types were placed in this encounter.             [1]   Current Outpatient Medications on File Prior to Visit   Medication Sig Dispense Refill    0.9% NaCl SolP 50 mL with DAPTOmycin 500 mg SolR 820 mg Inject 820 mg into the vein once daily.      ciprofloxacin HCl (CIPRO) 250 MG tablet Take 3 tablets (750 mg total) by mouth every 12 (twelve) hours. 180 tablet 0    HYDROcodone-acetaminophen (NORCO) 7.5-325 mg per tablet Take 1 tablet by mouth every 6 hours as needed for pain......May cause drowsiness. 21 tablet 0    insulin glargine-yfgn (SEMGLEE,INSULIN GLARG-YFGN,PEN) 100 unit/mL (3 mL) InPn Inject 25 Units into the skin every evening. 7.5 mL 11    insulin syringe-needle U-100 0.3 mL 29 gauge Syrg 1 Needle by Misc.(Non-Drug; Combo Route) route 2 (two) times a day. 100 each 11    nicotine (NICODERM CQ) 14 mg/24 hr Place 1 patch onto the skin once daily. 28 patch 0    pen needle, diabetic (BD ULTRA-FINE SHORT PEN NEEDLE) 31 gauge x 5/16" Ndle Use with Semglee daily 100 each 3    atorvastatin (LIPITOR) 40 MG tablet Take 1 tablet (40 mg total) by mouth once daily. 30 tablet 0    losartan-hydrochlorothiazide 50-12.5 mg (HYZAAR) 50-12.5 mg per tablet Take 1 tablet by mouth once daily. 30 tablet 0     No current facility-administered medications on file prior to visit.   [2]   Social History  Socioeconomic History    Marital status:    Tobacco Use    Smoking status: Every Day     Types: Vaping with nicotine    Smokeless tobacco: Never   Vaping Use    Vaping status: Unknown   Substance and Sexual Activity    Alcohol use: Yes     Comment: 6 pack a day    Drug use: Never    Sexual activity: Yes     Partners: Female     Social Drivers of Health     Financial Resource Strain: Low Risk  (7/29/2025)    Overall Financial Resource Strain (CARDIA)     Difficulty of Paying Living Expenses: Not hard at all   Food Insecurity: No " Food Insecurity (7/29/2025)    Hunger Vital Sign     Worried About Running Out of Food in the Last Year: Never true     Ran Out of Food in the Last Year: Never true   Transportation Needs: No Transportation Needs (7/29/2025)    PRAPARE - Transportation     Lack of Transportation (Medical): No     Lack of Transportation (Non-Medical): No   Physical Activity: Insufficiently Active (7/30/2025)    Exercise Vital Sign     Days of Exercise per Week: 4 days     Minutes of Exercise per Session: 30 min   Stress: No Stress Concern Present (7/29/2025)    [employer] [place] [employer] - Occupational Stress Questionnaire     Feeling of Stress : Not at all   Housing Stability: Low Risk  (7/29/2025)    Housing Stability Vital Sign     Unable to Pay for Housing in the Last Year: No     Homeless in the Last Year: No

## 2025-08-11 ENCOUNTER — OFFICE VISIT (OUTPATIENT)
Dept: FAMILY MEDICINE | Facility: CLINIC | Age: 50
End: 2025-08-11
Payer: COMMERCIAL

## 2025-08-11 VITALS
DIASTOLIC BLOOD PRESSURE: 92 MMHG | SYSTOLIC BLOOD PRESSURE: 146 MMHG | BODY MASS INDEX: 30.81 KG/M2 | WEIGHT: 208 LBS | OXYGEN SATURATION: 99 % | TEMPERATURE: 97 F | RESPIRATION RATE: 20 BRPM | HEART RATE: 74 BPM | HEIGHT: 69 IN

## 2025-08-11 DIAGNOSIS — M86.271 SUBACUTE OSTEOMYELITIS OF RIGHT FOOT: ICD-10-CM

## 2025-08-11 DIAGNOSIS — L08.9 DIABETIC FOOT INFECTION: ICD-10-CM

## 2025-08-11 DIAGNOSIS — E78.5 DM TYPE 2 WITH DIABETIC DYSLIPIDEMIA: ICD-10-CM

## 2025-08-11 DIAGNOSIS — E11.51 DIABETIC PERIPHERAL VASCULAR DISEASE: ICD-10-CM

## 2025-08-11 DIAGNOSIS — E78.49 OTHER HYPERLIPIDEMIA: ICD-10-CM

## 2025-08-11 DIAGNOSIS — E13.21 DIABETIC NEPHROPATHY ASSOCIATED WITH OTHER SPECIFIED DIABETES MELLITUS: ICD-10-CM

## 2025-08-11 DIAGNOSIS — L03.119 CELLULITIS OF MULTIPLE SITES OF LOWER EXTREMITY: ICD-10-CM

## 2025-08-11 DIAGNOSIS — E66.9 OBESITY (BMI 30-39.9): ICD-10-CM

## 2025-08-11 DIAGNOSIS — E11.621 DIABETIC ULCER OF RIGHT MIDFOOT ASSOCIATED WITH TYPE 2 DIABETES MELLITUS, UNSPECIFIED ULCER STAGE: Primary | Chronic | ICD-10-CM

## 2025-08-11 DIAGNOSIS — E11.628 DIABETIC FOOT INFECTION: ICD-10-CM

## 2025-08-11 DIAGNOSIS — L97.419 DIABETIC ULCER OF RIGHT MIDFOOT ASSOCIATED WITH TYPE 2 DIABETES MELLITUS, UNSPECIFIED ULCER STAGE: Primary | Chronic | ICD-10-CM

## 2025-08-11 DIAGNOSIS — E11.65 TYPE 2 DIABETES MELLITUS WITH HYPERGLYCEMIA, WITHOUT LONG-TERM CURRENT USE OF INSULIN: ICD-10-CM

## 2025-08-11 DIAGNOSIS — E11.69 DM TYPE 2 WITH DIABETIC DYSLIPIDEMIA: ICD-10-CM

## 2025-08-11 DIAGNOSIS — F17.200 TOBACCO DEPENDENCY: ICD-10-CM

## 2025-08-19 ENCOUNTER — OFFICE VISIT (OUTPATIENT)
Dept: SURGERY | Facility: CLINIC | Age: 50
End: 2025-08-19
Payer: COMMERCIAL

## 2025-08-19 VITALS — HEIGHT: 69 IN | BODY MASS INDEX: 30.72 KG/M2

## 2025-08-19 DIAGNOSIS — L97.513 FOOT ULCER, RIGHT, WITH NECROSIS OF MUSCLE: Primary | ICD-10-CM

## 2025-08-19 PROCEDURE — 3046F HEMOGLOBIN A1C LEVEL >9.0%: CPT | Mod: CPTII,,, | Performed by: STUDENT IN AN ORGANIZED HEALTH CARE EDUCATION/TRAINING PROGRAM

## 2025-08-19 PROCEDURE — 3062F POS MACROALBUMINURIA REV: CPT | Mod: CPTII,,, | Performed by: STUDENT IN AN ORGANIZED HEALTH CARE EDUCATION/TRAINING PROGRAM

## 2025-08-19 PROCEDURE — 99214 OFFICE O/P EST MOD 30 MIN: CPT | Mod: S$PBB,,, | Performed by: STUDENT IN AN ORGANIZED HEALTH CARE EDUCATION/TRAINING PROGRAM

## 2025-08-19 PROCEDURE — 3066F NEPHROPATHY DOC TX: CPT | Mod: CPTII,,, | Performed by: STUDENT IN AN ORGANIZED HEALTH CARE EDUCATION/TRAINING PROGRAM

## 2025-08-19 PROCEDURE — 1111F DSCHRG MED/CURRENT MED MERGE: CPT | Mod: CPTII,,, | Performed by: STUDENT IN AN ORGANIZED HEALTH CARE EDUCATION/TRAINING PROGRAM

## 2025-08-19 PROCEDURE — 99213 OFFICE O/P EST LOW 20 MIN: CPT | Mod: PBBFAC | Performed by: STUDENT IN AN ORGANIZED HEALTH CARE EDUCATION/TRAINING PROGRAM

## 2025-08-19 PROCEDURE — 99999 PR PBB SHADOW E&M-EST. PATIENT-LVL III: CPT | Mod: PBBFAC,,, | Performed by: STUDENT IN AN ORGANIZED HEALTH CARE EDUCATION/TRAINING PROGRAM

## 2025-08-19 PROCEDURE — 1160F RVW MEDS BY RX/DR IN RCRD: CPT | Mod: CPTII,,, | Performed by: STUDENT IN AN ORGANIZED HEALTH CARE EDUCATION/TRAINING PROGRAM

## 2025-08-19 PROCEDURE — 3008F BODY MASS INDEX DOCD: CPT | Mod: CPTII,,, | Performed by: STUDENT IN AN ORGANIZED HEALTH CARE EDUCATION/TRAINING PROGRAM

## 2025-08-19 PROCEDURE — 1159F MED LIST DOCD IN RCRD: CPT | Mod: CPTII,,, | Performed by: STUDENT IN AN ORGANIZED HEALTH CARE EDUCATION/TRAINING PROGRAM

## 2025-08-19 RX ORDER — SILVER SULFADIAZINE 10 G/1000G
CREAM TOPICAL DAILY
Qty: 1000 G | Refills: 11 | Status: SHIPPED | OUTPATIENT
Start: 2025-08-19

## 2025-08-20 ENCOUNTER — OFFICE VISIT (OUTPATIENT)
Dept: FAMILY MEDICINE | Facility: CLINIC | Age: 50
End: 2025-08-20
Payer: COMMERCIAL

## 2025-08-20 VITALS
WEIGHT: 214.81 LBS | HEIGHT: 69 IN | BODY MASS INDEX: 31.82 KG/M2 | DIASTOLIC BLOOD PRESSURE: 98 MMHG | HEART RATE: 93 BPM | SYSTOLIC BLOOD PRESSURE: 159 MMHG

## 2025-08-20 DIAGNOSIS — E11.621 DIABETIC ULCER OF RIGHT MIDFOOT ASSOCIATED WITH TYPE 2 DIABETES MELLITUS, UNSPECIFIED ULCER STAGE: ICD-10-CM

## 2025-08-20 DIAGNOSIS — Z91.89 AT RISK FOR ACUTE ISCHEMIC CARDIAC EVENT: ICD-10-CM

## 2025-08-20 DIAGNOSIS — M86.271 SUBACUTE OSTEOMYELITIS OF RIGHT FOOT: Primary | ICD-10-CM

## 2025-08-20 DIAGNOSIS — I10 PRIMARY HYPERTENSION: ICD-10-CM

## 2025-08-20 DIAGNOSIS — L97.419 DIABETIC ULCER OF RIGHT MIDFOOT ASSOCIATED WITH TYPE 2 DIABETES MELLITUS, UNSPECIFIED ULCER STAGE: ICD-10-CM

## 2025-08-20 DIAGNOSIS — E11.65 UNCONTROLLED TYPE 2 DIABETES MELLITUS WITH HYPERGLYCEMIA: ICD-10-CM

## 2025-08-20 DIAGNOSIS — Z89.429 STATUS POST AMPUTATION OF TOE: ICD-10-CM

## 2025-08-20 PROCEDURE — 99214 OFFICE O/P EST MOD 30 MIN: CPT | Mod: ,,,

## 2025-08-20 PROCEDURE — 1160F RVW MEDS BY RX/DR IN RCRD: CPT | Mod: CPTII,,,

## 2025-08-20 PROCEDURE — 3066F NEPHROPATHY DOC TX: CPT | Mod: CPTII,,,

## 2025-08-20 PROCEDURE — 1159F MED LIST DOCD IN RCRD: CPT | Mod: CPTII,,,

## 2025-08-20 PROCEDURE — 1111F DSCHRG MED/CURRENT MED MERGE: CPT | Mod: CPTII,,,

## 2025-08-20 PROCEDURE — 3046F HEMOGLOBIN A1C LEVEL >9.0%: CPT | Mod: CPTII,,,

## 2025-08-20 PROCEDURE — 3008F BODY MASS INDEX DOCD: CPT | Mod: CPTII,,,

## 2025-08-20 PROCEDURE — 3080F DIAST BP >= 90 MM HG: CPT | Mod: CPTII,,,

## 2025-08-20 PROCEDURE — 3062F POS MACROALBUMINURIA REV: CPT | Mod: CPTII,,,

## 2025-08-20 PROCEDURE — 3077F SYST BP >= 140 MM HG: CPT | Mod: CPTII,,,

## 2025-08-20 RX ORDER — ATORVASTATIN CALCIUM 40 MG/1
40 TABLET, FILM COATED ORAL NIGHTLY
Qty: 90 TABLET | Refills: 3 | Status: SHIPPED | OUTPATIENT
Start: 2025-08-20

## 2025-08-20 RX ORDER — INSULIN GLARGINE-YFGN 100 [IU]/ML
30 INJECTION, SOLUTION SUBCUTANEOUS NIGHTLY
Qty: 9 ML | Refills: 11 | Status: SHIPPED | OUTPATIENT
Start: 2025-08-20 | End: 2026-08-20

## 2025-08-20 RX ORDER — LOSARTAN POTASSIUM AND HYDROCHLOROTHIAZIDE 12.5; 5 MG/1; MG/1
1 TABLET ORAL DAILY
Qty: 90 TABLET | Refills: 3 | Status: SHIPPED | OUTPATIENT
Start: 2025-08-20 | End: 2026-08-15

## 2025-08-20 RX ORDER — HYDROCODONE BITARTRATE AND ACETAMINOPHEN 7.5; 325 MG/1; MG/1
1 TABLET ORAL EVERY 6 HOURS PRN
Qty: 21 TABLET | Refills: 0 | Status: SHIPPED | OUTPATIENT
Start: 2025-08-20

## 2025-08-21 ENCOUNTER — OFFICE VISIT (OUTPATIENT)
Dept: WOUND CARE | Facility: HOSPITAL | Age: 50
End: 2025-08-21
Attending: FAMILY MEDICINE
Payer: COMMERCIAL

## 2025-08-21 VITALS
SYSTOLIC BLOOD PRESSURE: 184 MMHG | TEMPERATURE: 98 F | HEART RATE: 86 BPM | RESPIRATION RATE: 18 BRPM | DIASTOLIC BLOOD PRESSURE: 97 MMHG

## 2025-08-21 DIAGNOSIS — E11.65 TYPE 2 DIABETES MELLITUS WITH HYPERGLYCEMIA, WITHOUT LONG-TERM CURRENT USE OF INSULIN: ICD-10-CM

## 2025-08-21 DIAGNOSIS — L97.419 DIABETIC ULCER OF RIGHT MIDFOOT ASSOCIATED WITH TYPE 2 DIABETES MELLITUS, UNSPECIFIED ULCER STAGE: Chronic | ICD-10-CM

## 2025-08-21 DIAGNOSIS — E11.51 DIABETIC PERIPHERAL VASCULAR DISEASE: ICD-10-CM

## 2025-08-21 DIAGNOSIS — M86.271 SUBACUTE OSTEOMYELITIS OF RIGHT FOOT: ICD-10-CM

## 2025-08-21 DIAGNOSIS — E11.621 DIABETIC ULCER OF RIGHT MIDFOOT ASSOCIATED WITH TYPE 2 DIABETES MELLITUS, UNSPECIFIED ULCER STAGE: Chronic | ICD-10-CM

## 2025-08-21 DIAGNOSIS — L03.119 CELLULITIS OF MULTIPLE SITES OF LOWER EXTREMITY: Primary | ICD-10-CM

## 2025-08-21 PROCEDURE — 99214 OFFICE O/P EST MOD 30 MIN: CPT | Mod: ,,, | Performed by: FAMILY MEDICINE

## 2025-08-21 PROCEDURE — 1111F DSCHRG MED/CURRENT MED MERGE: CPT | Mod: CPTII,,, | Performed by: FAMILY MEDICINE

## 2025-08-21 PROCEDURE — 3077F SYST BP >= 140 MM HG: CPT | Mod: CPTII,,, | Performed by: FAMILY MEDICINE

## 2025-08-21 PROCEDURE — 1159F MED LIST DOCD IN RCRD: CPT | Mod: CPTII,,, | Performed by: FAMILY MEDICINE

## 2025-08-21 PROCEDURE — 3080F DIAST BP >= 90 MM HG: CPT | Mod: CPTII,,, | Performed by: FAMILY MEDICINE

## 2025-08-21 PROCEDURE — 99215 OFFICE O/P EST HI 40 MIN: CPT | Performed by: FAMILY MEDICINE

## 2025-08-21 PROCEDURE — 3062F POS MACROALBUMINURIA REV: CPT | Mod: CPTII,,, | Performed by: FAMILY MEDICINE

## 2025-08-21 PROCEDURE — 3046F HEMOGLOBIN A1C LEVEL >9.0%: CPT | Mod: CPTII,,, | Performed by: FAMILY MEDICINE

## 2025-08-21 PROCEDURE — 3066F NEPHROPATHY DOC TX: CPT | Mod: CPTII,,, | Performed by: FAMILY MEDICINE

## 2025-08-26 ENCOUNTER — OFFICE VISIT (OUTPATIENT)
Dept: FAMILY MEDICINE | Facility: CLINIC | Age: 50
End: 2025-08-26
Payer: COMMERCIAL

## 2025-08-26 VITALS
OXYGEN SATURATION: 99 % | SYSTOLIC BLOOD PRESSURE: 178 MMHG | BODY MASS INDEX: 32.46 KG/M2 | WEIGHT: 219.19 LBS | TEMPERATURE: 98 F | HEART RATE: 74 BPM | DIASTOLIC BLOOD PRESSURE: 110 MMHG | HEIGHT: 69 IN

## 2025-08-26 DIAGNOSIS — S80.821A: Primary | ICD-10-CM

## 2025-08-26 DIAGNOSIS — I10 PRIMARY HYPERTENSION: ICD-10-CM

## 2025-08-26 DIAGNOSIS — E11.65 UNCONTROLLED TYPE 2 DIABETES MELLITUS WITH HYPERGLYCEMIA: ICD-10-CM

## 2025-08-26 LAB — GLUCOSE SERPL-MCNC: 183 MG/DL (ref 70–110)

## 2025-08-26 PROCEDURE — 3066F NEPHROPATHY DOC TX: CPT | Mod: CPTII,,,

## 2025-08-26 PROCEDURE — 1159F MED LIST DOCD IN RCRD: CPT | Mod: CPTII,,,

## 2025-08-26 PROCEDURE — 1111F DSCHRG MED/CURRENT MED MERGE: CPT | Mod: CPTII,,,

## 2025-08-26 PROCEDURE — 3046F HEMOGLOBIN A1C LEVEL >9.0%: CPT | Mod: CPTII,,,

## 2025-08-26 PROCEDURE — 99214 OFFICE O/P EST MOD 30 MIN: CPT | Mod: ,,,

## 2025-08-26 PROCEDURE — 3008F BODY MASS INDEX DOCD: CPT | Mod: CPTII,,,

## 2025-08-26 PROCEDURE — 3077F SYST BP >= 140 MM HG: CPT | Mod: CPTII,,,

## 2025-08-26 PROCEDURE — 3062F POS MACROALBUMINURIA REV: CPT | Mod: CPTII,,,

## 2025-08-26 PROCEDURE — 3080F DIAST BP >= 90 MM HG: CPT | Mod: CPTII,,,

## 2025-08-26 RX ORDER — LOSARTAN POTASSIUM AND HYDROCHLOROTHIAZIDE 12.5; 5 MG/1; MG/1
2 TABLET ORAL DAILY
Qty: 180 TABLET | Refills: 3 | Status: SHIPPED | OUTPATIENT
Start: 2025-08-26 | End: 2026-08-21

## 2025-08-26 RX ORDER — MUPIROCIN 20 MG/G
OINTMENT TOPICAL 3 TIMES DAILY
Qty: 60 G | Refills: 11 | Status: SHIPPED | OUTPATIENT
Start: 2025-08-26

## 2025-09-02 ENCOUNTER — OFFICE VISIT (OUTPATIENT)
Dept: FAMILY MEDICINE | Facility: CLINIC | Age: 50
End: 2025-09-02
Payer: COMMERCIAL

## 2025-09-02 VITALS
BODY MASS INDEX: 32.36 KG/M2 | WEIGHT: 218.5 LBS | DIASTOLIC BLOOD PRESSURE: 98 MMHG | HEIGHT: 69 IN | HEART RATE: 74 BPM | SYSTOLIC BLOOD PRESSURE: 158 MMHG

## 2025-09-02 DIAGNOSIS — E11.65 TYPE 2 DIABETES MELLITUS WITH HYPERGLYCEMIA, WITHOUT LONG-TERM CURRENT USE OF INSULIN: Primary | ICD-10-CM

## 2025-09-02 PROCEDURE — 99214 OFFICE O/P EST MOD 30 MIN: CPT | Mod: ,,,

## 2025-09-02 PROCEDURE — 3066F NEPHROPATHY DOC TX: CPT | Mod: CPTII,,,

## 2025-09-02 PROCEDURE — 3080F DIAST BP >= 90 MM HG: CPT | Mod: CPTII,,,

## 2025-09-02 PROCEDURE — 3077F SYST BP >= 140 MM HG: CPT | Mod: CPTII,,,

## 2025-09-02 PROCEDURE — 3008F BODY MASS INDEX DOCD: CPT | Mod: CPTII,,,

## 2025-09-02 PROCEDURE — 1159F MED LIST DOCD IN RCRD: CPT | Mod: CPTII,,,

## 2025-09-02 PROCEDURE — 1111F DSCHRG MED/CURRENT MED MERGE: CPT | Mod: CPTII,,,

## 2025-09-02 PROCEDURE — 3062F POS MACROALBUMINURIA REV: CPT | Mod: CPTII,,,

## 2025-09-02 PROCEDURE — 3046F HEMOGLOBIN A1C LEVEL >9.0%: CPT | Mod: CPTII,,,

## (undated) DEVICE — SWAB AEROBIC CULTURETTE

## (undated) DEVICE — SPONGE LAP 18X18 PREWASHED

## (undated) DEVICE — COLLECTOR SPECIMEN ANAEROBIC

## (undated) DEVICE — Device

## (undated) DEVICE — ELECTRODE BLADE INSULATED 1 IN

## (undated) DEVICE — GLOVE SENSICARE PI SURG 8

## (undated) DEVICE — DERM CURETTE 5MM DISP

## (undated) DEVICE — GOWN NONREINF SET-IN SLV 2XL

## (undated) DEVICE — DRAIN PENRS SIL STRL .25X18IN

## (undated) DEVICE — DISH PETRI MED 3.5IN

## (undated) DEVICE — BANDAGE GAUZE COT STRL 4.5X4.1

## (undated) DEVICE — GLOVE BIOGEL PIMICRO INDIC 8.5

## (undated) DEVICE — NDL HYPODERMIC SAF 25G 1.5IN

## (undated) DEVICE — GLOVE SENSICARE PI SURG 6

## (undated) DEVICE — TRAY SKIN SCRUB WET PREMIUM

## (undated) DEVICE — GLOVE SENSICARE PI GRN 7

## (undated) DEVICE — GLOVE SENSICARE PI GRN 6.5

## (undated) DEVICE — HANDLE MEDIVAC SUC YANK BLBOUS

## (undated) DEVICE — GLOVE SENSICARE PI SURG 6.5

## (undated) DEVICE — SUT ETHILON 3-0 PS2 18 BLK